# Patient Record
Sex: MALE | Race: WHITE | NOT HISPANIC OR LATINO | Employment: OTHER | URBAN - METROPOLITAN AREA
[De-identification: names, ages, dates, MRNs, and addresses within clinical notes are randomized per-mention and may not be internally consistent; named-entity substitution may affect disease eponyms.]

---

## 2022-06-28 ENCOUNTER — ANESTHESIA EVENT (OUTPATIENT)
Dept: SURGERY | Facility: HOSPITAL | Age: 37
DRG: 481 | End: 2022-06-28
Payer: MEDICARE

## 2022-06-28 ENCOUNTER — ANESTHESIA (OUTPATIENT)
Dept: SURGERY | Facility: HOSPITAL | Age: 37
DRG: 481 | End: 2022-06-28
Payer: MEDICARE

## 2022-06-28 ENCOUNTER — HOSPITAL ENCOUNTER (INPATIENT)
Facility: HOSPITAL | Age: 37
LOS: 19 days | Discharge: HOME HEALTH | DRG: 481 | End: 2022-07-17
Attending: STUDENT IN AN ORGANIZED HEALTH CARE EDUCATION/TRAINING PROGRAM | Admitting: STUDENT IN AN ORGANIZED HEALTH CARE EDUCATION/TRAINING PROGRAM
Payer: MEDICARE

## 2022-06-28 DIAGNOSIS — R07.9 CHEST PAIN: ICD-10-CM

## 2022-06-28 DIAGNOSIS — S72.009A HIP FRACTURE: ICD-10-CM

## 2022-06-28 DIAGNOSIS — S72.142A CLOSED COMMINUTED INTERTROCHANTERIC FRACTURE OF LEFT FEMUR, INITIAL ENCOUNTER: ICD-10-CM

## 2022-06-28 DIAGNOSIS — R00.0 TACHYCARDIA: ICD-10-CM

## 2022-06-28 LAB
ALBUMIN SERPL-MCNC: 3.5 GM/DL (ref 3.5–5)
ALBUMIN/GLOB SERPL: 1 RATIO (ref 1.1–2)
ALP SERPL-CCNC: 146 UNIT/L (ref 40–150)
ALT SERPL-CCNC: 63 UNIT/L (ref 0–55)
AST SERPL-CCNC: 41 UNIT/L (ref 5–34)
BASOPHILS # BLD AUTO: 0.06 X10(3)/MCL (ref 0–0.2)
BASOPHILS NFR BLD AUTO: 0.6 %
BILIRUBIN DIRECT+TOT PNL SERPL-MCNC: 0.8 MG/DL
BUN SERPL-MCNC: 11.1 MG/DL (ref 8.9–20.6)
CALCIUM SERPL-MCNC: 9.3 MG/DL (ref 8.4–10.2)
CHLORIDE SERPL-SCNC: 105 MMOL/L (ref 98–107)
CO2 SERPL-SCNC: 26 MMOL/L (ref 22–29)
CREAT SERPL-MCNC: 0.77 MG/DL (ref 0.73–1.18)
EOSINOPHIL # BLD AUTO: 0.29 X10(3)/MCL (ref 0–0.9)
EOSINOPHIL NFR BLD AUTO: 3 %
ERYTHROCYTE [DISTWIDTH] IN BLOOD BY AUTOMATED COUNT: 14.4 % (ref 11.5–17)
GLOBULIN SER-MCNC: 3.4 GM/DL (ref 2.4–3.5)
GLUCOSE SERPL-MCNC: 94 MG/DL (ref 74–100)
GROUP & RH: NORMAL
HCT VFR BLD AUTO: 33.5 % (ref 42–52)
HGB BLD-MCNC: 10.4 GM/DL (ref 14–18)
IMM GRANULOCYTES # BLD AUTO: 0.07 X10(3)/MCL (ref 0–0.02)
IMM GRANULOCYTES NFR BLD AUTO: 0.7 % (ref 0–0.43)
INDIRECT COOMBS GEL: NORMAL
LYMPHOCYTES # BLD AUTO: 1.43 X10(3)/MCL (ref 0.6–4.6)
LYMPHOCYTES NFR BLD AUTO: 14.8 %
MAGNESIUM SERPL-MCNC: 2.3 MG/DL (ref 1.6–2.6)
MCH RBC QN AUTO: 25.9 PG (ref 27–31)
MCHC RBC AUTO-ENTMCNC: 31 MG/DL (ref 33–36)
MCV RBC AUTO: 83.3 FL (ref 80–94)
MONOCYTES # BLD AUTO: 0.63 X10(3)/MCL (ref 0.1–1.3)
MONOCYTES NFR BLD AUTO: 6.5 %
NEUTROPHILS # BLD AUTO: 7.2 X10(3)/MCL (ref 2.1–9.2)
NEUTROPHILS NFR BLD AUTO: 74.4 %
NRBC BLD AUTO-RTO: 0 %
PHOSPHATE SERPL-MCNC: 3.8 MG/DL (ref 2.3–4.7)
PLATELET # BLD AUTO: 295 X10(3)/MCL (ref 130–400)
PMV BLD AUTO: 9.5 FL (ref 9.4–12.4)
POCT GLUCOSE: 196 MG/DL (ref 70–110)
POCT GLUCOSE: 297 MG/DL (ref 70–110)
POTASSIUM SERPL-SCNC: 5.1 MMOL/L (ref 3.5–5.1)
PROT SERPL-MCNC: 6.9 GM/DL (ref 6.4–8.3)
RBC # BLD AUTO: 4.02 X10(6)/MCL (ref 4.7–6.1)
SODIUM SERPL-SCNC: 138 MMOL/L (ref 136–145)
WBC # SPEC AUTO: 9.7 X10(3)/MCL (ref 4.5–11.5)

## 2022-06-28 PROCEDURE — 99223 1ST HOSP IP/OBS HIGH 75: CPT | Mod: 57,,, | Performed by: ORTHOPAEDIC SURGERY

## 2022-06-28 PROCEDURE — 25000003 PHARM REV CODE 250: Performed by: PHYSICIAN ASSISTANT

## 2022-06-28 PROCEDURE — 11000001 HC ACUTE MED/SURG PRIVATE ROOM

## 2022-06-28 PROCEDURE — 86901 BLOOD TYPING SEROLOGIC RH(D): CPT | Performed by: STUDENT IN AN ORGANIZED HEALTH CARE EDUCATION/TRAINING PROGRAM

## 2022-06-28 PROCEDURE — 25000003 PHARM REV CODE 250: Performed by: NURSE PRACTITIONER

## 2022-06-28 PROCEDURE — 25000003 PHARM REV CODE 250: Performed by: HOSPITALIST

## 2022-06-28 PROCEDURE — 27245 TREAT THIGH FRACTURE: CPT | Mod: LT,,, | Performed by: ORTHOPAEDIC SURGERY

## 2022-06-28 PROCEDURE — 85025 COMPLETE CBC W/AUTO DIFF WBC: CPT | Performed by: NURSE PRACTITIONER

## 2022-06-28 PROCEDURE — 27245 PR OPEN FIX INTER/SUBTROCH FX,IMPLNT: ICD-10-PCS | Mod: AS,LT,, | Performed by: PHYSICIAN ASSISTANT

## 2022-06-28 PROCEDURE — 63600175 PHARM REV CODE 636 W HCPCS: Performed by: PHYSICIAN ASSISTANT

## 2022-06-28 PROCEDURE — 97163 PT EVAL HIGH COMPLEX 45 MIN: CPT

## 2022-06-28 PROCEDURE — 71000033 HC RECOVERY, INTIAL HOUR: Performed by: ORTHOPAEDIC SURGERY

## 2022-06-28 PROCEDURE — 63600175 PHARM REV CODE 636 W HCPCS: Performed by: ORTHOPAEDIC SURGERY

## 2022-06-28 PROCEDURE — 36000711: Performed by: ORTHOPAEDIC SURGERY

## 2022-06-28 PROCEDURE — 27245 TREAT THIGH FRACTURE: CPT | Mod: AS,LT,, | Performed by: PHYSICIAN ASSISTANT

## 2022-06-28 PROCEDURE — 37000009 HC ANESTHESIA EA ADD 15 MINS: Performed by: ORTHOPAEDIC SURGERY

## 2022-06-28 PROCEDURE — 25000003 PHARM REV CODE 250: Performed by: NURSE ANESTHETIST, CERTIFIED REGISTERED

## 2022-06-28 PROCEDURE — C1769 GUIDE WIRE: HCPCS | Performed by: ORTHOPAEDIC SURGERY

## 2022-06-28 PROCEDURE — 27245 PR OPEN FIX INTER/SUBTROCH FX,IMPLNT: ICD-10-PCS | Mod: LT,,, | Performed by: ORTHOPAEDIC SURGERY

## 2022-06-28 PROCEDURE — 86920 COMPATIBILITY TEST SPIN: CPT | Performed by: HOSPITALIST

## 2022-06-28 PROCEDURE — 63600175 PHARM REV CODE 636 W HCPCS: Performed by: NURSE ANESTHETIST, CERTIFIED REGISTERED

## 2022-06-28 PROCEDURE — 36415 COLL VENOUS BLD VENIPUNCTURE: CPT | Performed by: NURSE PRACTITIONER

## 2022-06-28 PROCEDURE — P9047 ALBUMIN (HUMAN), 25%, 50ML: HCPCS | Mod: JG | Performed by: NURSE ANESTHETIST, CERTIFIED REGISTERED

## 2022-06-28 PROCEDURE — 63600175 PHARM REV CODE 636 W HCPCS: Performed by: NURSE PRACTITIONER

## 2022-06-28 PROCEDURE — 97166 OT EVAL MOD COMPLEX 45 MIN: CPT

## 2022-06-28 PROCEDURE — 36000710: Performed by: ORTHOPAEDIC SURGERY

## 2022-06-28 PROCEDURE — 99223 PR INITIAL HOSPITAL CARE,LEVL III: ICD-10-PCS | Mod: 57,,, | Performed by: ORTHOPAEDIC SURGERY

## 2022-06-28 PROCEDURE — 83735 ASSAY OF MAGNESIUM: CPT | Performed by: NURSE PRACTITIONER

## 2022-06-28 PROCEDURE — 80053 COMPREHEN METABOLIC PANEL: CPT | Performed by: NURSE PRACTITIONER

## 2022-06-28 PROCEDURE — 36415 COLL VENOUS BLD VENIPUNCTURE: CPT | Performed by: ORTHOPAEDIC SURGERY

## 2022-06-28 PROCEDURE — 84100 ASSAY OF PHOSPHORUS: CPT | Performed by: NURSE PRACTITIONER

## 2022-06-28 PROCEDURE — C1713 ANCHOR/SCREW BN/BN,TIS/BN: HCPCS | Performed by: ORTHOPAEDIC SURGERY

## 2022-06-28 PROCEDURE — 27201423 OPTIME MED/SURG SUP & DEVICES STERILE SUPPLY: Performed by: ORTHOPAEDIC SURGERY

## 2022-06-28 PROCEDURE — 63600175 PHARM REV CODE 636 W HCPCS: Performed by: ANESTHESIOLOGY

## 2022-06-28 PROCEDURE — 37000008 HC ANESTHESIA 1ST 15 MINUTES: Performed by: ORTHOPAEDIC SURGERY

## 2022-06-28 DEVICE — SCREW XL25 IM NAIL 38X5MM: Type: IMPLANTABLE DEVICE | Site: FEMUR | Status: FUNCTIONAL

## 2022-06-28 DEVICE — BLADE HELICAL PERF GOLD 95MM: Type: IMPLANTABLE DEVICE | Site: FEMUR | Status: FUNCTIONAL

## 2022-06-28 DEVICE — SCREW XL25 IM NAIL 42X5MM: Type: IMPLANTABLE DEVICE | Site: FEMUR | Status: FUNCTIONAL

## 2022-06-28 DEVICE — NAIL TFNA 130DEG 11MM L STRL: Type: IMPLANTABLE DEVICE | Site: FEMUR | Status: FUNCTIONAL

## 2022-06-28 RX ORDER — ACETAMINOPHEN 325 MG/1
650 TABLET ORAL EVERY 4 HOURS PRN
Status: DISCONTINUED | OUTPATIENT
Start: 2022-06-28 | End: 2022-07-17 | Stop reason: HOSPADM

## 2022-06-28 RX ORDER — SODIUM CHLORIDE 0.9 % (FLUSH) 0.9 %
10 SYRINGE (ML) INJECTION
Status: DISCONTINUED | OUTPATIENT
Start: 2022-06-28 | End: 2022-07-17 | Stop reason: HOSPADM

## 2022-06-28 RX ORDER — POLYETHYLENE GLYCOL 3350 17 G/17G
17 POWDER, FOR SOLUTION ORAL 2 TIMES DAILY PRN
Status: DISCONTINUED | OUTPATIENT
Start: 2022-06-28 | End: 2022-07-17 | Stop reason: HOSPADM

## 2022-06-28 RX ORDER — HYDROXYZINE HYDROCHLORIDE 25 MG/1
25 TABLET, FILM COATED ORAL DAILY
COMMUNITY

## 2022-06-28 RX ORDER — DOCUSATE SODIUM 100 MG/1
100 CAPSULE, LIQUID FILLED ORAL DAILY
Status: DISCONTINUED | OUTPATIENT
Start: 2022-06-28 | End: 2022-07-17 | Stop reason: HOSPADM

## 2022-06-28 RX ORDER — METOPROLOL TARTRATE 50 MG/1
50 TABLET ORAL ONCE
Status: COMPLETED | OUTPATIENT
Start: 2022-06-28 | End: 2022-06-28

## 2022-06-28 RX ORDER — BISACODYL 10 MG
10 SUPPOSITORY, RECTAL RECTAL DAILY PRN
Status: DISCONTINUED | OUTPATIENT
Start: 2022-06-28 | End: 2022-07-17 | Stop reason: HOSPADM

## 2022-06-28 RX ORDER — HETASTARCH 6 G/100ML
INJECTION, SOLUTION INTRAVENOUS CONTINUOUS PRN
Status: DISCONTINUED | OUTPATIENT
Start: 2022-06-28 | End: 2022-06-28

## 2022-06-28 RX ORDER — ONDANSETRON 2 MG/ML
4 INJECTION INTRAMUSCULAR; INTRAVENOUS EVERY 6 HOURS PRN
Status: DISCONTINUED | OUTPATIENT
Start: 2022-06-28 | End: 2022-07-17 | Stop reason: HOSPADM

## 2022-06-28 RX ORDER — ONDANSETRON 2 MG/ML
4 INJECTION INTRAMUSCULAR; INTRAVENOUS DAILY PRN
Status: DISCONTINUED | OUTPATIENT
Start: 2022-06-28 | End: 2022-07-17 | Stop reason: HOSPADM

## 2022-06-28 RX ORDER — DEXAMETHASONE SODIUM PHOSPHATE 4 MG/ML
INJECTION, SOLUTION INTRA-ARTICULAR; INTRALESIONAL; INTRAMUSCULAR; INTRAVENOUS; SOFT TISSUE
Status: DISCONTINUED | OUTPATIENT
Start: 2022-06-28 | End: 2022-06-28

## 2022-06-28 RX ORDER — HYDROXYZINE HYDROCHLORIDE 25 MG/1
25 TABLET, FILM COATED ORAL DAILY
Status: DISCONTINUED | OUTPATIENT
Start: 2022-06-28 | End: 2022-07-17 | Stop reason: HOSPADM

## 2022-06-28 RX ORDER — CEFAZOLIN SODIUM 2 G/50ML
2 SOLUTION INTRAVENOUS
Status: DISCONTINUED | OUTPATIENT
Start: 2022-06-28 | End: 2022-07-17 | Stop reason: HOSPADM

## 2022-06-28 RX ORDER — PROCHLORPERAZINE EDISYLATE 5 MG/ML
5 INJECTION INTRAMUSCULAR; INTRAVENOUS EVERY 6 HOURS PRN
Status: DISCONTINUED | OUTPATIENT
Start: 2022-06-28 | End: 2022-07-17 | Stop reason: HOSPADM

## 2022-06-28 RX ORDER — LEVOTHYROXINE SODIUM 50 UG/1
50 TABLET ORAL DAILY
Status: DISCONTINUED | OUTPATIENT
Start: 2022-06-28 | End: 2022-07-17 | Stop reason: HOSPADM

## 2022-06-28 RX ORDER — HYDROMORPHONE HYDROCHLORIDE 2 MG/ML
0.5 INJECTION, SOLUTION INTRAMUSCULAR; INTRAVENOUS; SUBCUTANEOUS EVERY 5 MIN PRN
Status: DISCONTINUED | OUTPATIENT
Start: 2022-06-28 | End: 2022-07-17 | Stop reason: HOSPADM

## 2022-06-28 RX ORDER — METHOCARBAMOL 500 MG/1
500 TABLET, FILM COATED ORAL 3 TIMES DAILY
Status: DISCONTINUED | OUTPATIENT
Start: 2022-06-28 | End: 2022-07-17 | Stop reason: HOSPADM

## 2022-06-28 RX ORDER — OXYCODONE AND ACETAMINOPHEN 10; 325 MG/1; MG/1
1 TABLET ORAL EVERY 4 HOURS PRN
Status: DISCONTINUED | OUTPATIENT
Start: 2022-06-28 | End: 2022-07-17 | Stop reason: HOSPADM

## 2022-06-28 RX ORDER — SUCCINYLCHOLINE CHLORIDE 20 MG/ML
INJECTION INTRAMUSCULAR; INTRAVENOUS
Status: DISCONTINUED | OUTPATIENT
Start: 2022-06-28 | End: 2022-06-28

## 2022-06-28 RX ORDER — MORPHINE SULFATE 4 MG/ML
2 INJECTION, SOLUTION INTRAMUSCULAR; INTRAVENOUS EVERY 4 HOURS PRN
Status: DISCONTINUED | OUTPATIENT
Start: 2022-06-28 | End: 2022-07-17 | Stop reason: HOSPADM

## 2022-06-28 RX ORDER — ACETAMINOPHEN 10 MG/ML
1000 INJECTION, SOLUTION INTRAVENOUS ONCE
Status: ACTIVE | OUTPATIENT
Start: 2022-06-28 | End: 2022-06-29

## 2022-06-28 RX ORDER — ONDANSETRON 4 MG/1
4 TABLET, ORALLY DISINTEGRATING ORAL EVERY 8 HOURS PRN
Status: DISCONTINUED | OUTPATIENT
Start: 2022-06-28 | End: 2022-07-17 | Stop reason: HOSPADM

## 2022-06-28 RX ORDER — METOPROLOL TARTRATE 50 MG/1
50 TABLET ORAL ONCE
Status: ON HOLD | COMMUNITY
End: 2022-07-06 | Stop reason: HOSPADM

## 2022-06-28 RX ORDER — BUSPIRONE HYDROCHLORIDE 5 MG/1
5 TABLET ORAL ONCE
Status: ON HOLD | COMMUNITY
End: 2022-07-06 | Stop reason: HOSPADM

## 2022-06-28 RX ORDER — FENTANYL CITRATE 50 UG/ML
INJECTION, SOLUTION INTRAMUSCULAR; INTRAVENOUS
Status: DISCONTINUED | OUTPATIENT
Start: 2022-06-28 | End: 2022-06-28

## 2022-06-28 RX ORDER — CEFAZOLIN SODIUM 2 G/50ML
SOLUTION INTRAVENOUS
Status: DISCONTINUED
Start: 2022-06-28 | End: 2022-06-28

## 2022-06-28 RX ORDER — BUSPIRONE HYDROCHLORIDE 5 MG/1
5 TABLET ORAL ONCE
Status: COMPLETED | OUTPATIENT
Start: 2022-06-28 | End: 2022-06-28

## 2022-06-28 RX ORDER — AMOXICILLIN 250 MG
1 CAPSULE ORAL 2 TIMES DAILY PRN
Status: DISCONTINUED | OUTPATIENT
Start: 2022-06-28 | End: 2022-07-17 | Stop reason: HOSPADM

## 2022-06-28 RX ORDER — ONDANSETRON 2 MG/ML
4 INJECTION INTRAMUSCULAR; INTRAVENOUS EVERY 4 HOURS PRN
Status: DISCONTINUED | OUTPATIENT
Start: 2022-06-28 | End: 2022-07-17 | Stop reason: HOSPADM

## 2022-06-28 RX ORDER — ADHESIVE BANDAGE
30 BANDAGE TOPICAL DAILY PRN
Status: DISCONTINUED | OUTPATIENT
Start: 2022-06-28 | End: 2022-07-17 | Stop reason: HOSPADM

## 2022-06-28 RX ORDER — ENOXAPARIN SODIUM 100 MG/ML
40 INJECTION SUBCUTANEOUS EVERY 24 HOURS
Status: DISCONTINUED | OUTPATIENT
Start: 2022-06-28 | End: 2022-07-17 | Stop reason: HOSPADM

## 2022-06-28 RX ORDER — ALBUMIN HUMAN 250 G/1000ML
SOLUTION INTRAVENOUS CONTINUOUS PRN
Status: DISCONTINUED | OUTPATIENT
Start: 2022-06-28 | End: 2022-06-28

## 2022-06-28 RX ORDER — CEFAZOLIN SODIUM 1 G/3ML
INJECTION, POWDER, FOR SOLUTION INTRAMUSCULAR; INTRAVENOUS
Status: DISCONTINUED | OUTPATIENT
Start: 2022-06-28 | End: 2022-06-28

## 2022-06-28 RX ORDER — ROCURONIUM BROMIDE 10 MG/ML
INJECTION, SOLUTION INTRAVENOUS
Status: DISCONTINUED | OUTPATIENT
Start: 2022-06-28 | End: 2022-06-28

## 2022-06-28 RX ORDER — QUETIAPINE FUMARATE 200 MG/1
200 TABLET, FILM COATED ORAL DAILY
COMMUNITY

## 2022-06-28 RX ORDER — SODIUM CHLORIDE, SODIUM GLUCONATE, SODIUM ACETATE, POTASSIUM CHLORIDE AND MAGNESIUM CHLORIDE 30; 37; 368; 526; 502 MG/100ML; MG/100ML; MG/100ML; MG/100ML; MG/100ML
1000 INJECTION, SOLUTION INTRAVENOUS CONTINUOUS
Status: CANCELLED | OUTPATIENT
Start: 2022-06-28 | End: 2022-07-28

## 2022-06-28 RX ORDER — LEVOTHYROXINE SODIUM 50 UG/1
50 TABLET ORAL DAILY
COMMUNITY

## 2022-06-28 RX ORDER — LIDOCAINE HYDROCHLORIDE 10 MG/ML
1 INJECTION, SOLUTION EPIDURAL; INFILTRATION; INTRACAUDAL; PERINEURAL ONCE
Status: CANCELLED | OUTPATIENT
Start: 2022-06-28 | End: 2022-06-28

## 2022-06-28 RX ORDER — ACETAMINOPHEN 10 MG/ML
INJECTION, SOLUTION INTRAVENOUS
Status: DISCONTINUED | OUTPATIENT
Start: 2022-06-28 | End: 2022-06-28

## 2022-06-28 RX ORDER — OFLOXACIN 3 MG/ML
SOLUTION AURICULAR (OTIC) DAILY
COMMUNITY

## 2022-06-28 RX ORDER — DIPHENHYDRAMINE HYDROCHLORIDE 50 MG/ML
25 INJECTION INTRAMUSCULAR; INTRAVENOUS EVERY 6 HOURS PRN
Status: DISCONTINUED | OUTPATIENT
Start: 2022-06-28 | End: 2022-06-30

## 2022-06-28 RX ORDER — PROPOFOL 10 MG/ML
VIAL (ML) INTRAVENOUS
Status: DISCONTINUED | OUTPATIENT
Start: 2022-06-28 | End: 2022-06-28

## 2022-06-28 RX ORDER — QUETIAPINE FUMARATE 100 MG/1
200 TABLET, FILM COATED ORAL DAILY
Status: DISCONTINUED | OUTPATIENT
Start: 2022-06-28 | End: 2022-06-30

## 2022-06-28 RX ORDER — CEFAZOLIN SODIUM 2 G/50ML
SOLUTION INTRAVENOUS
Status: DISCONTINUED
Start: 2022-06-28 | End: 2022-06-28 | Stop reason: WASHOUT

## 2022-06-28 RX ORDER — HYDROCODONE BITARTRATE AND ACETAMINOPHEN 10; 325 MG/1; MG/1
1 TABLET ORAL EVERY 6 HOURS PRN
Status: DISCONTINUED | OUTPATIENT
Start: 2022-06-28 | End: 2022-06-28

## 2022-06-28 RX ORDER — OXYCODONE AND ACETAMINOPHEN 5; 325 MG/1; MG/1
1 TABLET ORAL EVERY 4 HOURS PRN
Status: DISCONTINUED | OUTPATIENT
Start: 2022-06-28 | End: 2022-07-17 | Stop reason: HOSPADM

## 2022-06-28 RX ORDER — MAG HYDROX/ALUMINUM HYD/SIMETH 200-200-20
30 SUSPENSION, ORAL (FINAL DOSE FORM) ORAL EVERY 4 HOURS PRN
Status: DISCONTINUED | OUTPATIENT
Start: 2022-06-28 | End: 2022-07-17 | Stop reason: HOSPADM

## 2022-06-28 RX ORDER — SENNOSIDES 8.6 MG/1
8.6 TABLET ORAL DAILY PRN
Status: DISCONTINUED | OUTPATIENT
Start: 2022-06-28 | End: 2022-07-17 | Stop reason: HOSPADM

## 2022-06-28 RX ORDER — CEFAZOLIN SODIUM 2 G/50ML
2 SOLUTION INTRAVENOUS
Status: DISCONTINUED | OUTPATIENT
Start: 2022-06-28 | End: 2022-06-29

## 2022-06-28 RX ORDER — FAMOTIDINE 20 MG/1
20 TABLET, FILM COATED ORAL DAILY PRN
Status: DISCONTINUED | OUTPATIENT
Start: 2022-06-28 | End: 2022-07-17 | Stop reason: HOSPADM

## 2022-06-28 RX ORDER — PHENYLEPHRINE HYDROCHLORIDE 10 MG/ML
INJECTION INTRAVENOUS
Status: DISCONTINUED | OUTPATIENT
Start: 2022-06-28 | End: 2022-06-28

## 2022-06-28 RX ORDER — VANCOMYCIN HYDROCHLORIDE 1 G/20ML
INJECTION, POWDER, LYOPHILIZED, FOR SOLUTION INTRAVENOUS
Status: DISCONTINUED | OUTPATIENT
Start: 2022-06-28 | End: 2022-06-28 | Stop reason: HOSPADM

## 2022-06-28 RX ADMIN — OXYCODONE AND ACETAMINOPHEN 1 TABLET: 10; 325 TABLET ORAL at 11:06

## 2022-06-28 RX ADMIN — CEFAZOLIN 2 G: 330 INJECTION, POWDER, FOR SOLUTION INTRAMUSCULAR; INTRAVENOUS at 07:06

## 2022-06-28 RX ADMIN — MORPHINE SULFATE 2 MG: 4 INJECTION INTRAVENOUS at 01:06

## 2022-06-28 RX ADMIN — SODIUM CHLORIDE 500 ML: 9 INJECTION, SOLUTION INTRAVENOUS at 11:06

## 2022-06-28 RX ADMIN — PHENYLEPHRINE HYDROCHLORIDE 100 MCG: 10 INJECTION INTRAVENOUS at 07:06

## 2022-06-28 RX ADMIN — QUETIAPINE FUMARATE 200 MG: 100 TABLET ORAL at 11:06

## 2022-06-28 RX ADMIN — PHENYLEPHRINE HYDROCHLORIDE 200 MCG: 10 INJECTION INTRAVENOUS at 08:06

## 2022-06-28 RX ADMIN — PHENYLEPHRINE HYDROCHLORIDE 100 MCG: 10 INJECTION INTRAVENOUS at 08:06

## 2022-06-28 RX ADMIN — SUCCINYLCHOLINE CHLORIDE 120 MG: 20 INJECTION, SOLUTION INTRAMUSCULAR; INTRAVENOUS at 07:06

## 2022-06-28 RX ADMIN — CEFAZOLIN SODIUM 2 G: 2 SOLUTION INTRAVENOUS at 11:06

## 2022-06-28 RX ADMIN — ONDANSETRON 4 MG: 2 INJECTION INTRAMUSCULAR; INTRAVENOUS at 07:06

## 2022-06-28 RX ADMIN — SODIUM CHLORIDE, SODIUM GLUCONATE, SODIUM ACETATE, POTASSIUM CHLORIDE AND MAGNESIUM CHLORIDE: 526; 502; 368; 37; 30 INJECTION, SOLUTION INTRAVENOUS at 07:06

## 2022-06-28 RX ADMIN — METHOCARBAMOL 500 MG: 500 TABLET ORAL at 11:06

## 2022-06-28 RX ADMIN — LEVOTHYROXINE SODIUM 50 MCG: 25 TABLET ORAL at 11:06

## 2022-06-28 RX ADMIN — DOCUSATE SODIUM 100 MG: 100 CAPSULE, LIQUID FILLED ORAL at 11:06

## 2022-06-28 RX ADMIN — BUSPIRONE HYDROCHLORIDE 5 MG: 5 TABLET ORAL at 11:06

## 2022-06-28 RX ADMIN — HYDROMORPHONE HYDROCHLORIDE 0.5 MG: 2 INJECTION, SOLUTION INTRAMUSCULAR; INTRAVENOUS; SUBCUTANEOUS at 09:06

## 2022-06-28 RX ADMIN — FENTANYL CITRATE 50 MCG: 50 INJECTION, SOLUTION INTRAMUSCULAR; INTRAVENOUS at 08:06

## 2022-06-28 RX ADMIN — SUGAMMADEX 200 MG: 100 INJECTION, SOLUTION INTRAVENOUS at 08:06

## 2022-06-28 RX ADMIN — ACETAMINOPHEN 650 MG: 325 TABLET, FILM COATED ORAL at 06:06

## 2022-06-28 RX ADMIN — ROCURONIUM BROMIDE 5 MG: 10 SOLUTION INTRAVENOUS at 07:06

## 2022-06-28 RX ADMIN — ALBUMIN (HUMAN): 12.5 SOLUTION INTRAVENOUS at 08:06

## 2022-06-28 RX ADMIN — METOPROLOL TARTRATE 50 MG: 50 TABLET, FILM COATED ORAL at 11:06

## 2022-06-28 RX ADMIN — ENOXAPARIN SODIUM 40 MG: 100 INJECTION SUBCUTANEOUS at 05:06

## 2022-06-28 RX ADMIN — HETASTARCH IN SODIUM CHLORIDE: 6; .9 INJECTION, SOLUTION INTRAVENOUS at 08:06

## 2022-06-28 RX ADMIN — ACETAMINOPHEN 1000 MG: 10 INJECTION, SOLUTION INTRAVENOUS at 07:06

## 2022-06-28 RX ADMIN — PHENYLEPHRINE HYDROCHLORIDE 200 MCG: 10 INJECTION INTRAVENOUS at 07:06

## 2022-06-28 RX ADMIN — HYDROXYZINE HYDROCHLORIDE 25 MG: 25 TABLET ORAL at 11:06

## 2022-06-28 RX ADMIN — ROCURONIUM BROMIDE 30 MG: 10 SOLUTION INTRAVENOUS at 07:06

## 2022-06-28 RX ADMIN — FENTANYL CITRATE 50 MCG: 50 INJECTION, SOLUTION INTRAMUSCULAR; INTRAVENOUS at 07:06

## 2022-06-28 RX ADMIN — DEXAMETHASONE SODIUM PHOSPHATE 4 MG: 4 INJECTION, SOLUTION INTRA-ARTICULAR; INTRALESIONAL; INTRAMUSCULAR; INTRAVENOUS; SOFT TISSUE at 07:06

## 2022-06-28 RX ADMIN — PROPOFOL 100 MG: 10 INJECTION, EMULSION INTRAVENOUS at 07:06

## 2022-06-28 NOTE — PT/OT/SLP EVAL
"Occupational Therapy   Evaluation    Name: Nilo Claros Jr.  MRN: [id number]  Admitting Diagnosis:  Closed comminuted intertrochanteric fracture of left femur, hx of falls, s/p IMN of L intertrochanteric fracture of femur, MRDD  Recent Surgery: Procedure(s) (LRB):  INSERTION, INTRAMEDULLARY AMIRAH, FEMUR (Left) Day of Surgery    Recommendations:     Discharge Recommendations: nursing facility, skilled vs  rehabilitation facility  Discharge Equipment Recommendations:  bedside commode, walker, rolling, bath bench  Barriers to discharge:  Decreased caregiver support (Pts mom stated that it has been harder to care for him at home)    Assessment:     Nilo Claros Jr. is a 36 y.o. male with a medical diagnosis of Closed comminuted intertrochanteric fracture of left femur, hx of falls, s/p IMN of L intertrochanteric fracture of femur, MRDD. Performance deficits affecting function: impaired self care skills, impaired functional mobilty, impaired cognition, decreased lower extremity function, decreased safety awareness, pain, orthopedic precautions.      Rehab Prognosis: Good; patient would benefit from acute skilled OT services to address these deficits and reach maximum level of function.       Plan:     Patient to be seen 5 x/week, daily to address the above listed problems via self-care/home management  Plan of Care Expires: 07/12/22  Plan of Care Reviewed with: patient, mother    Subjective     Chief Complaint: "It hurts"   Patient/Family Comments/goals: To get him stronger and more independent      Occupational Profile:  Living Environment: Pt lives at home with his mom, ad, and brother. Family lives in a mobile home that has 4-5 steps required to go in through the entrance and back door with handrails on both sides. Mom reports that pt has fallen on these steps before. There is a bath and a walkin shower, however mom states that pt likes to take baths. There are grab bars in the walk in shower and mom " reports that when he performs toileting he has to hold on to something.   Previous level of function: As per mom, she stated that he was independent prior in ADLs. She shared that pt started losing his balance and having multiple falls, she is concerned because he has a shunt in place and believes that there might be something wrong because pt was doing well. She reports they checked shunt yesterday and it was ok.  Mom shared that he had a recent kidney infection which caused him to have bladder incontinence but that has resolved and pt can verbalize and know when he needs to use the bathroom.    Equipment Used at Home:  None, grab bars around walk in shower.   Assistance upon Discharge: Pt will have limited assistance upon d/c, as mom verbalized that she cannot physically care for him. She shared that during his most recent fall she was not able to pick him up d/t patient being too heavy. Mom would like for pt to get stronger and more independent before going home. She is afraid that if he falls again he can disrupt shunt placement.     Pain/Comfort:  Pain Rating 1: 7/10  Location - Side 1: Left  Location 1: leg  Pain Addressed 1: Reposition, Distraction    Patients cultural, spiritual, Bahai conflicts given the current situation: no    Objective:      Patient found supine with peripheral IV upon OT entry to room.    General Precautions: Standard, fall   Orthopedic Precautions:LLE weight bearing as tolerated   Respiratory Status: Room air    Occupational Performance:    Bed Mobility:    Patient completed Supine to Sit with minimum assistance  Patient completed Sit to Supine with minimum assistance   Minimum assistance required during bed mobility d/t pain in LLE.     Functional Mobility/Transfers:  Patient completed Sit <> Stand Transfer with minimum assistance with rolling walker   Functional Mobility: Pt able to perform sit to stand with min assistance 2x. Pt required min cues for hand placement and  "postioning on RW. Pt limited in standing for long period of time or ambulating d/t pain. Pt reported "it hurts" when standing. Pt able to reach upright posture 1x, Pt observed being able to tolerate light WB on LLE.     Activities of Daily Living:  Pt limited by pain. OT to continue assessing and update.     Cognitive/Visual Perceptual:  Cognitive/Psychosocial Skills:     -       Follows Commands/attention:Follows two-step commands  -       Mood/Affect/Coping skills/emotional control: Cooperative and Pleasant  Visual/Perceptual:      -Intact  tracking    Physical Exam:  Upper Extremity Strength:    -       Right Upper Extremity: 3-/5 shoulder, 3+/5 elbow, 3/5 wrist, 3/5 hand  -       Left Upper Extremity: 3-/5 shoulder, 3+/5 elbow, 3/5 wrist, 3/5 hand    Treatment & Education:  OT educated mom and pt on POC. Mom shared concern on decreased assistance that she can provide and inquired about pt going to placement so that he can get stronger and increase his independent. Mom shared that his onset of decreased balance started out of nowhere and he has been having a lot of falls.  Pt followed commands and understood directions regarding mobility and hand placement. Pt able to communicate enough to interact with OT.     Patient left supine with all lines intact, call button in reach and mom present    GOALS:   Multidisciplinary Problems       Occupational Therapy Goals          Problem: Occupational Therapy    Goal Priority Disciplines Outcome Interventions   Occupational Therapy Goal     OT, PT/OT Ongoing, Progressing    Description: Goals to be met by: 7/12/22    Patient will increase functional independence with ADLs by performing:    Feeding with [place].  UE Dressing with [place].  LE Dressing with [place].  Grooming while standing at sink with [place].  Toileting from toilet with [place] for hygiene and clothing management.   Toilet transfer to toilet with [place].                   "       History:     No past medical history on file.    No past surgical history on file.    Time Tracking:     OT Date of Treatment: 06/28/22  OT Start Time: 1329  OT Stop Time: 1347  OT Total Time (min): 18 min    Billable Minutes:Evaluation mod complexity     6/28/2022  Note signed and edited by Gisele Mcdermott, direct supervision

## 2022-06-28 NOTE — TRANSFER OF CARE
Anesthesia Transfer of Care Note    Patient: Nilo Claros Jr.    Procedure(s) Performed: Procedure(s) (LRB):  INSERTION, INTRAMEDULLARY AMIRAH, FEMUR (Left)    Patient location: PACU    Anesthesia Type: general    Transport from OR: Transported from OR on room air with adequate spontaneous ventilation    Post pain: adequate analgesia    Post assessment: no apparent anesthetic complications    Post vital signs: stable    Level of consciousness: awake    Nausea/Vomiting: no nausea/vomiting    Complications: none    Transfer of care protocol was followed      Last vitals:   Visit Vitals  BP 96/67   Pulse 108   Temp 36.8 °C (98.3 °F)   Resp 20   SpO2 (!) 93%

## 2022-06-28 NOTE — H&P
[facility] Hospital Medicine History & Physical Examination       Patient Name: Nilo Claros Jr.  MRN: [id number]  Patient Class: IP- Inpatient   Admission Date: 6/28/2022   Admitting Physician:  Service   Attending Physician: Clemente Patel MD  Primary Care Provider: Primary Doctor No  Face-to-Face encounter date: 06/28/2022  Code Status:<CODESTATUS>   Chief Complaint: No chief complaint on file.          Patient information was obtained from patient, patient's family, past medical records and ER records.     HISTORY OF PRESENT ILLNESS:   Nilo Claros Jr. is a 36 y.o. male who  has no past medical history on file.. The patient presented to [facility] on 6/28/2022     36 year old male with pmhx of childhood brain tumor s/p excision with chornic developmental delay, anxiety, and hypothyroidism presents to the ED after a fall at home.  Mother at bedside providing history as patient unable to participate in conversation.  Mother reports multiple falls lately and she's having greater difficulty caring for him.  States he tripped and fell last night and she was unable to get him off the floor.  EMS was called and he was transported ot the hospital.  XRs revealed L intertrochanteric femur fracture.  Vitals and labs stable and at baseline.  He does not take any blood thinners.  Compliant with home med regimen.  Orthopedics was consulted and the patient was admitted to the hospitalist group    PAST MEDICAL HISTORY:     Developmental delay  Generalized anxiety disorder  Essential Hypertension    PAST SURGICAL HISTORY:     Childhood brain tumor excision    ALLERGIES:   Toradol [ketorolac]    FAMILY HISTORY:   Reviewed and negative    SOCIAL HISTORY:     Social History     Tobacco Use    Smoking status: Not on file    Smokeless tobacco: Not on file   Substance Use Topics    Alcohol use: Not on file        HOME MEDICATIONS:     Prior to Admission medications    Not on File     Nursing  to update    REVIEW OF SYSTEMS:   Except as documented, all other systems reviewed and negative     PHYSICAL EXAM:     VITAL SIGNS: 24 HRS MIN & MAX LAST   Temp  Min: 98.3 °F (36.8 °C)  Max: 98.3 °F (36.8 °C) 98.3 °F (36.8 °C)   BP  Min: 96/67  Max: 111/76 103/74   Pulse  Min: 98  Max: 115  110   Resp  Min: 15  Max: 20 17   SpO2  Min: 71 %  Max: 100 % (!) 71 %       General appearance: Well-developed, well-nourished male in no apparent distress.  HENT: Atraumatic head. Moist mucous membranes of oral cavity.  Eyes: Normal extraocular movements.   Neck: Supple.   Lungs: Clear to auscultation bilaterally. No wheezing present.   Heart: Regular rate and rhythm. S1 and S2 present with no murmurs/gallop/rub. No pedal edema. No JVD present.   Abdomen: Soft, non-distended, non-tender. No rebound tenderness/guarding. Bowel sounds are normal.   Extremities: No cyanosis, clubbing, or edema.  Skin: No Rash.   Neuro: Motor and sensory exams grossly intact. Good tone. Muscle strength 5/5 in all 4 extremities  Psych/mental status: Appropriate mood and affect. Responds appropriately to questions.     LABS AND IMAGING:     Recent Labs   Lab 06/28/22  0415   WBC 9.7   RBC 4.02*   HGB 10.4*   HCT 33.5*   MCV 83.3   MCH 25.9*   MCHC 31.0*   RDW 14.4      MPV 9.5       Recent Labs   Lab 06/28/22  0415      K 5.1   CO2 26   BUN 11.1   CREATININE 0.77   CALCIUM 9.3   MG 2.30   ALBUMIN 3.5   ALKPHOS 146   ALT 63*   AST 41*   BILITOT 0.8       Microbiology Results (last 7 days)     ** No results found for the last 168 hours. **           X-Ray Femur 2 View Left  Narrative: EXAMINATION:  XR FEMUR 2 VIEW LEFT    CLINICAL HISTORY:  post-op;    TECHNIQUE:  AP and lateral views of the left femur were performed.    COMPARISON:  06/28/2022    FINDINGS:  There is a compression screw in the left hip.  The lesser trochanter is a free fragment.  Intramedullary jennifer is in place.  The alignment is within normal limits.  Impression: Good  alignment of fracture status post placement of compression screw.    Electronically signed by: Osmin Hooker MD  Date:    06/28/2022  Time:    10:09  SURG FL Surgery Fluoro Usage  See OP Notes for results.     IMPRESSION: See OP Notes for results.     This procedure was auto-finalized by: Virtual Radiologist  X-Ray Femur 2 View Left  Narrative: EXAMINATION:  XR FEMUR 2 VIEW LEFT    CLINICAL HISTORY:  ; fx;    TECHNIQUE:  Frontal and lateral views of the femur(s).    COMPARISON:  None available at the time of initial interpretation.    FINDINGS:  Views of the left femur are substandard, with absence of typical orthogonal projections.  Acute, mildly displaced intertrochanteric fracture is appreciated at the left proximal femur.  No other acute osseous abnormality is identified.  Visualized joints are congruent.  Included pelvic ring structures are intact.    The included soft tissues are without acute abnormality.  Impression: Limited evaluation secondary to substandard technique.  Within limitations:    1. Mildly displaced intertrochanteric left femur fracture.  2. No other convincing acute abnormality.    Electronically signed by: Thierno Kong  Date:    06/28/2022  Time:    07:32      _____________________________________  INPATIENT LIST OF MEDICATIONS     Current Facility-Administered Medications:     acetaminophen 1,000 mg/100 mL (10 mg/mL) injection 1,000 mg, 1,000 mg, Intravenous, Once, Darrel Hernandez MD    acetaminophen tablet 650 mg, 650 mg, Oral, Q4H PRN, PARRISH Rios    acetaminophen tablet 650 mg, 650 mg, Oral, Q4H PRN, PARRISH Rios    aluminum-magnesium hydroxide-simethicone 200-200-20 mg/5 mL suspension 30 mL, 30 mL, Oral, Q4H PRN, PARRISH Rios    bisacodyL suppository 10 mg, 10 mg, Rectal, Daily PRN, PARRISH Rios    bisacodyL suppository 10 mg, 10 mg, Rectal, Daily PRN, Lois Clements PA-C    cefazolin (ANCEF) 2 gram in dextrose 5% 50 mL IVPB (premix), 2 g,  Intravenous, On Call Procedure, Chalino Melgar,     cefazolin (ANCEF) 2 gram in dextrose 5% 50 mL IVPB (premix), 2 g, Intravenous, Q8H, Lois Clements PA-C    ceFAZolin 2 g/50mL Dextrose IVPB (ANCEF) 2 gram/50 mL IVPB PgBk, , , ,     diphenhydrAMINE injection 25 mg, 25 mg, Intravenous, Q6H PRN, Darrel Hernandez MD    docusate sodium capsule 100 mg, 100 mg, Oral, Daily, Lois Clements PA-C    enoxaparin injection 40 mg, 40 mg, Subcutaneous, Daily, Lois Clements PA-C    famotidine tablet 20 mg, 20 mg, Oral, Daily PRN, Lois CHRISTIANSON Tyree, PA-C    HYDROmorphone (PF) injection 0.5 mg, 0.5 mg, Intravenous, Q5 Min PRN, Darrel Hernandez MD, 0.5 mg at 06/28/22 0909    magnesium hydroxide 400 mg/5 ml suspension 2,400 mg, 30 mL, Oral, Daily PRN, Lois Clements PA-C    methocarbamoL tablet 500 mg, 500 mg, Oral, TID, Lois Garnette, PA-C    morphine injection 2 mg, 2 mg, Intravenous, Q4H PRN, Shara Hayward, PARRISH    morphine injection 2 mg, 2 mg, Intravenous, Q4H PRN, Shara Hayward, PARRISH, 2 mg at 06/28/22 0133    ondansetron disintegrating tablet 4 mg, 4 mg, Oral, Q8H PRN, Lois Clements PA-C    ondansetron injection 4 mg, 4 mg, Intravenous, Q4H PRN, Shara Hayward, PARRISH    ondansetron injection 4 mg, 4 mg, Intravenous, Daily PRN, Darrel Hernandez MD    ondansetron injection 4 mg, 4 mg, Intravenous, Q6H PRN, Lois Clements PA-C, 4 mg at 06/28/22 0753    oxyCODONE-acetaminophen  mg per tablet 1 tablet, 1 tablet, Oral, Q4H PRN, Lois Clements PA-C    oxyCODONE-acetaminophen 5-325 mg per tablet 1 tablet, 1 tablet, Oral, Q4H PRN, Lois Clements PA-C    polyethylene glycol packet 17 g, 17 g, Oral, BID PRN, PARRISH Rios    prochlorperazine injection Soln 5 mg, 5 mg, Intravenous, Q6H PRN, PARRISH Rios    senna tablet 8.6 mg, 8.6 mg, Oral, Daily PRN, Lois Clements PA-C    senna-docusate 8.6-50 mg per tablet 1 tablet, 1 tablet, Oral, BID  PRN, Shara Hayward, PARRISH    sodium chloride 0.9% flush 10 mL, 10 mL, Intravenous, PRN, Shara Hayward, SANDRAP    trazodone split tablet 25 mg, 25 mg, Oral, Nightly PRN, SANDRA RiosP      Scheduled Meds:   acetaminophen  1,000 mg Intravenous Once    ceFAZolin (ANCEF) IVPB  2 g Intravenous Q8H    ceFAZolin 2 g/50mL Dextrose IVPB        docusate sodium  100 mg Oral Daily    enoxaparin  40 mg Subcutaneous Daily    methocarbamoL  500 mg Oral TID     Continuous Infusions:  PRN Meds:.acetaminophen, acetaminophen, aluminum-magnesium hydroxide-simethicone, bisacodyL, bisacodyL, ceFAZolin 2 g/50mL Dextrose IVPB, diphenhydrAMINE, famotidine, HYDROmorphone, magnesium hydroxide 400 mg/5 ml, morphine, morphine, ondansetron, ondansetron, ondansetron, ondansetron, oxyCODONE-acetaminophen, oxyCODONE-acetaminophen, polyethylene glycol, prochlorperazine, senna, senna-docusate 8.6-50 mg, sodium chloride 0.9%, trazodone      VTE Prophylaxis: will be placed on appropriate DVT prophylaxis and will be advised to be as mobile as possible and sit in a chair as tolerated  _____________________________________    ASSESSMENT & PLAN:   L intertrochanteric femur fracture, displaced  Fall at home  Essential Hypertension  Hypothyroidism  H/o developmental delay    NPO for now, ortho to take to the OR later today  Plan to resume home meds when back to the floor  Mother has bag of meds with her  Monitor labs and vitals post-op  PT/OT when cleared by ortho  Discussed placement with patient's mother.  Seems like she is in agreement but may need longer term options as she is getting overwhelmed at home.   Case mgmt to be consulted

## 2022-06-28 NOTE — ANESTHESIA PROCEDURE NOTES
Intubation    Date/Time: 6/28/2022 7:26 AM  Performed by: Jeremy Gordon CRNA  Authorized by: Darrel Hernandez MD     Intubation:     Induction:  Intravenous    Intubated:  Postinduction    Mask Ventilation:  N/a    Attempts:  1    Attempted By:  CRNA    Method of Intubation:  Direct    Blade:  Iqbal 2    Laryngeal View Grade: Grade IIA - cords partially seen      Difficult Airway Encountered?: No      Complications:  None    Airway Device:  Oral endotracheal tube    Airway Device Size:  7.5    Style/Cuff Inflation:  Cuffed    Tube secured:  21    Placement Verified By:  Capnometry    Complicating Factors:  None    Findings Post-Intubation:  BS equal bilateral  Notes:      Very poor dentition.

## 2022-06-28 NOTE — PLAN OF CARE
Problem: Physical Therapy  Goal: Physical Therapy Goal  Description: Goals to be met by: 22     Patient will increase functional independence with mobility by performin. Supine to sit with MInimal Assistance  2. Sit to supine with MInimal Assistance  3. Sit to stand transfer with Minimal Assistance  4. Bed to chair transfer with Minimal Assistance using Rolling Walker  5. Gait  x 50 feet with Minimal Assistance using Rolling Walker.     Outcome: Ongoing, Progressing

## 2022-06-28 NOTE — ANESTHESIA PREPROCEDURE EVALUATION
06/28/2022  Nilo Claros Jr. is a 36 y.o., male who suffered femur fracture during ground level fall.  He is quite debilitated and has been falling frequently.  He has a history of remote intracranial tumor and surgery, and has some slow speech and lethargy as a result.  He does seem to answer questions appropriately, is cognizant of situation, asks about rehab etc.  Consent signed with his mother who is present.  She states that he has done well under general anesthesia in the past.      Pre-op Assessment    I have reviewed the Patient Summary Reports.     I have reviewed the Nursing Notes. I have reviewed the NPO Status.   I have reviewed the Medications.     Review of Systems  Anesthesia Hx:  No problems with previous Anesthesia  Denies Family Hx of Anesthesia complications.   Denies Personal Hx of Anesthesia complications.   Cardiovascular:  Cardiovascular Normal Exercise tolerance: poor   Denies Angina.    Pulmonary:  Pulmonary Normal    Neurological:   Neuromuscular Disease, Hx of brain tumor, surgery, speech impediment.       Physical Exam  General: Well nourished, Cooperative, Alert, Oriented, Lethargic and Flat Affect    Airway:  Mallampati: II   Mouth Opening: Normal  TM Distance: Normal  Tongue: Normal  Neck ROM: Normal ROM    Chest/Lungs:  Clear to auscultation, Normal Respiratory Rate    Heart:  Rate: Normal  Rhythm: Regular Rhythm        Anesthesia Plan  Type of Anesthesia, risks & benefits discussed:    Anesthesia Type: Gen ETT  Intra-op Monitoring Plan: Standard ASA Monitors  Post Op Pain Control Plan: multimodal analgesia and IV/PO Opioids PRN  Induction:  IV  Airway Plan: Direct, Post-Induction  Informed Consent: Informed consent signed with the Patient and all parties understand the risks and agree with anesthesia plan.  All questions answered.   ASA Score: 3  Day of Surgery Review  of History & Physical: H&P Update referred to the surgeon/provider.    Ready For Surgery From Anesthesia Perspective.     .

## 2022-06-28 NOTE — OP NOTE
OPERATIVE REPORT    Patient: Nilo Claros Jr.   : 1985    MRN: [id number]  Date: 2022      Surgeon:Chalino Melgar DO  Assistant: Tevin Clements was essential, part of the procedure including deep hardware placement and deep closure.  No senior assistant was availible  Preoperative Diagnosis: : Closed left intertrochanteric femur fracture  Postoperative Diagnosis: Same  Procedure:  Treatment left  intertrochanteric femur fracture with intramedullary nail CPT 60759  Anesthesiologist: Darrel Hernandez MD  OR Staff: Circulator: Frannie Beltran RN  Radiology Technologist: Humble Puri RT  Scrub Person: ST Levi  Implants:   Implant Name Type Inv. Item Serial No.  Lot No. LRB No. Used Action   AMIRAH REAM W/BL TP 2.5MM D 950 - SN/A  AMIRAH REAM W/BL TP 2.5MM D 950 N/A [employer] [id number] Left 1 Implanted and Explanted   BLADE HELICAL PERF GOLD 95MM - SN/A  BLADE HELICAL PERF GOLD 95MM N/A [employer] [id number] Left 1 Implanted   NAIL TFNA 130DEG 11MM L STRL - SN/A  NAIL TFNA 130DEG 11MM L STRL N/A [employer] [id number] Left 1 Implanted   WIRE GUIDE 3.2MM 400MM - SN/A  WIRE GUIDE 3.2MM 400MM N/A [employer] N/A  Left 2 Implanted and Explanted   synthes tfn proximal femoral nailing system 4.2 drill bit qc/ needle point 145 mm   N/A  N/A Left 2 Implanted and Explanted   SCREW XL25 IM NAIL 38X5MM - SN/A  SCREW XL25 IM NAIL 38X5MM N/A [employer] [id number] Left 1 Implanted   SCREW XL25 IM NAIL 42X5MM - SN/A  SCREW XL25 IM NAIL 42X5MM N/A [employer] [id number] Left 1 Implanted     EBL: See anesthesia note  Complications: None  Disposition: To PACU, stable    Indications: Nilo Claros Jr. is a 36 y.o. male presenting with the aforementioned injuries. The procedure is indicated to best obtain and maintain stability of the femur while allowing early ROM.  The patient is MRDD. After thorough discussion of the risks, benefits, expected outcomes, and alternatives to surgical intervention, the  patient's family agreed to proceed with surgical treatment.  Specific risks discussed included, but were not limited to: superficial or deep infection, wound healing complications, DVT/PE, significant bleeding requiring transfusion, damage to named anatomic structures in the immediate area including named neurovascular structures, implant failure, and general risks of anesthesia.  The patient voiced understanding and written as well as verbal consent was obtained by myself prior to the procedure.    Procedure in Detail:  The patient's left lower extremity was marked by me in the preoperative area.  He was taken to the operating room, and after satisfactory induction of anesthesia, the patient was placed in the supine position with a small bump under the ipsilateral hip.   The ipsilateral lower extremity was then sterilely prepped and draped in the usual sterile fashion.  Standard time out procedure was performed confirming the correct surgeon, site, side, patient ID, procedure, and administration of antibiotics.       A 3 cm longitudinal incision was made just proximal to the greater trochanter.  The subcutaneous tissue and gluteal fascia were incised.  A threaded guide pin was then placed in the tip of the greater trochanter and extended and introduced into the proximal femur under AP and lateral fluoroscopy.  The Synthes one-step reamer was then used to ream proximally. A ball-tipped guide jennifer was then placed through the entry site down to the physeal scar of the femur.  This was measured and then was reamed .  A Synthes TFN  appropiate size  was then passed over the guidewire, which was subsequently removed.  The proximal interlocking device was then placed and a 2-cm longitudinal incision was made over the lateral aspect of the proximal thigh and the fascia abdiaziz was incised.  A threaded guide pin was then placed through the lateral cortex of the femur through the femoral neck and into the femoral head in the  center-center position.  A helical blade was then placed under fluoroscopy and satisfactory position was noted on AP and lateral fluoroscopy and the setscrew was then set. The proximal interlocking device was then removed.    Distal interlocking screws were done with  two single lateral to medial interlocking screw under fluoroscopic guidance.  All wounds were then thoroughly irrigated.  Subcutaneous tissues were closed with interrupted inverted of 2-0 Monocryl.  Skin was approximated using skin staples.  Sterile dressing was applied.  General anesthesia was reversed and she was returned to the Postanesthesia Care Unit in stable condition.      All sponge and needle counts were correct at the end of the case.  I was present and participated in all aspects of the procedure.    Prognosis:  The patient will be kept WBAT on the ipsilateral extremity.  DVT prophylaxis is indicated for this patient and procedure.  The patient is at risk of pain, infection, and nonunion, and we will watch for all of these, amongst other issues, as the patient continues to heal.      This note/OR report was created with the assistance of  voice recognition software or phone  dictation.  There may be transcription errors as a result of using this technology however minimal. Effort has been made to assure accuracy of transcription but any obvious errors or omissions should be clarified with the author of the document.       Chalino Melgar, DO  Orthopedic Trauma Surgery

## 2022-06-28 NOTE — CONSULTS
[facility] Tele[facility]  Orthopedic Trauma  Consult Note    Patient Name: Nilo Claros Jr.  MRN: [id number]  Admission Date: 6/28/2022  Hospital Length of Stay: 0 days  Attending Provider: Latasha Fernando DO  Primary Care Provider: Primary Doctor No        Consults  Subjective:         Chief Complaint:  Left hip pain     HPI:  Patient is a 36-year-old male history of a brain injury or tumor.  He has difficulty speaking following instructions.  He has had multiple falls in the previous 3 days.  He was transferred from outside facility.  Patient has a left intertrochanteric femur fracture.  Mother's bedside and consents for the patient    No past medical history on file.    No past surgical history on file.    Review of patient's allergies indicates:   Allergen Reactions    Toradol [ketorolac]        Current Facility-Administered Medications   Medication    acetaminophen tablet 650 mg    acetaminophen tablet 650 mg    aluminum-magnesium hydroxide-simethicone 200-200-20 mg/5 mL suspension 30 mL    bisacodyL suppository 10 mg    cefazolin (ANCEF) 2 gram in dextrose 5% 50 mL IVPB (premix)    ceFAZolin 2 g/50mL Dextrose IVPB (ANCEF) 2 gram/50 mL IVPB PgBk    HYDROcodone-acetaminophen  mg per tablet 1 tablet    morphine injection 2 mg    morphine injection 2 mg    ondansetron injection 4 mg    polyethylene glycol packet 17 g    prochlorperazine injection Soln 5 mg    senna-docusate 8.6-50 mg per tablet 1 tablet    sodium chloride 0.9% flush 10 mL    trazodone split tablet 25 mg     Family History    None       Tobacco Use    Smoking status: Not on file    Smokeless tobacco: Not on file   Substance and Sexual Activity    Alcohol use: Not on file    Drug use: Not on file    Sexual activity: Not on file       ROS:  Constitutional: Denies fever chills  Eyes: No change in vision  ENT: No ringing or current infections  CV: No chest pain  Resp: No labored  breathing  MSK: Pain evident at site of injury located in HPI,   Integ: No signs of abrasions or lacerations  Neuro: No numbness or tingling  Lymphatic: No swelling outside the area of injury   Objective:     Vital Signs (Most Recent):  Temp: 98.3 °F (36.8 °C) (06/28/22 0400)  Pulse: 108 (06/28/22 0713)  Resp: 20 (06/28/22 0713)  BP: 96/67 (06/28/22 0713)  SpO2: (!) 93 % (06/28/22 0713) Vital Signs (24h Range):  Temp:  [98.3 °F (36.8 °C)] 98.3 °F (36.8 °C)  Pulse:  [] 108  Resp:  [18-20] 20  SpO2:  [93 %] 93 %  BP: ()/(67-76) 96/67           There is no height or weight on file to calculate BMI.    No intake or output data in the 24 hours ending 06/28/22 0714    Ortho/SPM Exam  General the patient is alert no acute distress nontoxic-appearing appropriate affect.    Constitutional: Vital signs are examined and stable.  Resp: No signs of labored breathing               LLE: -Skin: No signs of new abrasions or lacerations, no scars           -MSK:  Actively flexes and extends the knee and foot on stimulation           -Neuro:  Sensation grossly intact           -Lymphatic: No signs of lymphadenopathy           -CV: Capillary refill is less than 2 seconds. DP/PT pulses 2/4. Compartments soft and compressible                           Significant Labs: All pertinent labs within the past 24 hours have been reviewed.  Recent Lab Results       06/28/22  0415        Albumin/Globulin Ratio 1.0       Albumin 3.5       Alkaline Phosphatase 146       ALT 63       AST 41       Baso # 0.06       Basophil % 0.6       BILIRUBIN TOTAL 0.8       BUN 11.1       Calcium 9.3       Chloride 105       CO2 26       Creatinine 0.77       eGFR if non African American >60       Eos # 0.29       Eosinophil % 3.0       Globulin, Total 3.4       Glucose 94       Hematocrit 33.5       Hemoglobin 10.4       Immature Grans (Abs) 0.07       Immature Granulocytes 0.7       Lymph # 1.43       LYMPH % 14.8       Magnesium 2.30       MCH  25.9       MCHC 31.0       MCV 83.3       Mono # 0.63       Mono % 6.5       MPV 9.5       Neut # 7.2       Neut % 74.4       nRBC 0.0       Phosphorus 3.8       Platelets 295       Potassium 5.1       PROTEIN TOTAL 6.9       RBC 4.02       RDW 14.4       Sodium 138       WBC 9.7              Significant Imaging: I have reviewed all pertinent imaging results/findings.  No results found.     Assessment/Plan:     Active Diagnoses:    Diagnosis Date Noted POA    PRINCIPAL PROBLEM:  Closed comminuted intertrochanteric fracture of left femur [S72.142A] 06/28/2022 Unknown      Problems Resolved During this Admission:       Patient suffered a fragility fracture left intertrochanteric femur fracture do from a fall from standing.  Mother states he has had multiple falls previously.  Due to his fracture will take him for surgery patient will receive medical clearance later today.  Is low functional level baseline.  He does use tobacco chew.    Plan for surgery today.      This note/OR report was created with the assistance of  voice recognition software or phone  dictation.  There may be transcription errors as a result of using this technology however minimal. Effort has been made to assure accuracy of transcription but any obvious errors or omissions should be clarified with the author of the document.       Chalino Melgar, DO   Orthopedic Trauma Surgery  [facility] - [facility] Tele[facility]

## 2022-06-28 NOTE — PLAN OF CARE
Problem: Occupational Therapy  Goal: Occupational Therapy Goal  Description: Goals to be met by: 7/12/22    Patient will increase functional independence with ADLs by performing:    LE Dressing with modified Starke with AD.  Grooming while standing at sink with Starke.  Toileting from toilet with Starke for hygiene and clothing management.   Toilet transfer to toilet with modified Starke.    Outcome: Ongoing, Progressing   Note edited by MAI Mcdermott

## 2022-06-28 NOTE — PT/OT/SLP EVAL
Physical Therapy Evaluation    Patient Name:  Nilo Claros Jr.   MRN:  [id number]    Recommendations:     Discharge Recommendations:  nursing facility, skilled   Discharge Equipment Recommendations:  (TBD by next level of care)   Barriers to discharge: Increased need of assistance    Assessment:     Nilo Claros Jr. is a 36 y.o. male admitted with a  Closed comminuted intertrochanteric fracture of left femur. He is s/p IM nail. He is to be WBAT. Patient with childhood brain tumor s/p excision with chronic developmental delay.  He lives with his family in mobile home with 6 steps (no rails) to enter. He is mostly independent with ADL's and mobility.  He presents with the following impairments/functional limitations:  weakness, impaired self care skills, impaired functional mobilty, gait instability, impaired balance, impaired cognition, decreased lower extremity function, decreased safety awareness, abnormal tone. He was total assistance for bed mobility. Sitting balance was good once stabilized upright. Stood with RW & MOD A. Patient able to shuffle to right with RW & MIN A. Limited by pain. Had discussion with mom. She is unable to care for patient if he is requiring significant amount of assistance for ADL's and mobility. She would like him to go to SNF until he is more independent. Will attempt to see patient BID with focus on improving mobility. Progress patient as tolerated.     Rehab Prognosis: Good; patient would benefit from acute skilled PT services to address these deficits and reach maximum level of function.    Recent Surgery: Procedure(s) (LRB):  INSERTION, INTRAMEDULLARY AMIRAH, FEMUR (Left) Day of Surgery    Plan:     During this hospitalization, patient to be seen BID to address the identified rehab impairments via gait training, therapeutic activities, therapeutic exercises, neuromuscular re-education and progress toward the following goals:    · Plan of Care Expires:   "07/28/22    Subjective     Chief Complaint: pain  Patient/Family Comments/goals: Mom expressed concerns of caring for patient at his current LOF.   Pain/Comfort:  · Pain Rating 1:  (Unable to rate)  · Location - Side 1: Left  · Location 1: hip  · Pain Addressed 1: Reposition, Distraction, Pre-medicate for activity  · Pain Rating Post-Intervention 1:  (Unable to rate)    Patients cultural, spiritual, Jain conflicts given the current situation: no    Living Environment:  Mobile home with 6 steps to enter.   Prior to admission, patients level of function was "mostly independent" per mom.  Equipment used at home: none.  DME owned (not currently used): none.  Upon discharge, patient will have assistance from TBD.    Objective:     Communicated with nurse prior to session.  Patient found supine with telemetry  upon PT entry to room.    General Precautions: Standard, fall   Orthopedic Precautions:LLE weight bearing as tolerated   Braces: N/A  Respiratory Status: Room air    Exams:  · Cognitive Exam:  Patient is oriented to Person, Place, Time and Situation  · Sensation:    · -       Intact  · RLE ROM: WFL  · RLE Strength: WFL  · LLE ROM: Unable to accurately assess 2/2 pain  · LLE Strength: Unable to accurately assess 2/2 pain. Expect weakness    Functional Mobility:  · Bed Mobility:     · Supine to Sit: total assistance  · Sit to Supine: total assistance  · Transfers:     · Sit to Stand:  moderate assistance with rolling walker  · Gait: Patient able to shuffle to right with RW & MIN A.   · Balance: Fair    AM-PAC 6 CLICK MOBILITY  Total Score:10     Patient left HOB elevated with all lines intact, call button in reach and mother present.    GOALS:   Multidisciplinary Problems     Physical Therapy Goals        Problem: Physical Therapy    Goal Priority Disciplines Outcome Goal Variances Interventions   Physical Therapy Goal     PT, PT/OT Ongoing, Progressing     Description: Goals to be met by: 7/28/22     Patient " will increase functional independence with mobility by performin. Supine to sit with MInimal Assistance  2. Sit to supine with MInimal Assistance  3. Sit to stand transfer with Minimal Assistance  4. Bed to chair transfer with Minimal Assistance using Rolling Walker  5. Gait  x 50 feet with Minimal Assistance using Rolling Walker.                      History:     No past medical history on file.    No past surgical history on file.    Time Tracking:     PT Received On: 22  PT Start Time: 1145     PT Stop Time: 1205  PT Total Time (min): 20 min     Billable Minutes: High Complexity Evaluation 20 minutes      2022

## 2022-06-29 LAB
ABO + RH BLD: NORMAL
ABO + RH BLD: NORMAL
ALBUMIN SERPL-MCNC: 3.2 GM/DL (ref 3.5–5)
ALBUMIN/GLOB SERPL: 1.3 RATIO (ref 1.1–2)
ALP SERPL-CCNC: 90 UNIT/L (ref 40–150)
ALT SERPL-CCNC: 35 UNIT/L (ref 0–55)
AST SERPL-CCNC: 29 UNIT/L (ref 5–34)
BASOPHILS # BLD AUTO: 0.02 X10(3)/MCL (ref 0–0.2)
BASOPHILS NFR BLD AUTO: 0.2 %
BILIRUBIN DIRECT+TOT PNL SERPL-MCNC: 0.4 MG/DL
BLD PROD TYP BPU: NORMAL
BLD PROD TYP BPU: NORMAL
BLOOD UNIT EXPIRATION DATE: NORMAL
BLOOD UNIT EXPIRATION DATE: NORMAL
BLOOD UNIT TYPE CODE: 5100
BLOOD UNIT TYPE CODE: 5100
BUN SERPL-MCNC: 13.5 MG/DL (ref 8.9–20.6)
CALCIUM SERPL-MCNC: 8.3 MG/DL (ref 8.4–10.2)
CHLORIDE SERPL-SCNC: 105 MMOL/L (ref 98–107)
CO2 SERPL-SCNC: 24 MMOL/L (ref 22–29)
CREAT SERPL-MCNC: 0.8 MG/DL (ref 0.73–1.18)
CROSSMATCH INTERPRETATION: NORMAL
CROSSMATCH INTERPRETATION: NORMAL
DISPENSE STATUS: NORMAL
DISPENSE STATUS: NORMAL
EOSINOPHIL # BLD AUTO: 0.01 X10(3)/MCL (ref 0–0.9)
EOSINOPHIL NFR BLD AUTO: 0.1 %
ERYTHROCYTE [DISTWIDTH] IN BLOOD BY AUTOMATED COUNT: 14.5 % (ref 11.5–17)
GLOBULIN SER-MCNC: 2.4 GM/DL (ref 2.4–3.5)
GLUCOSE SERPL-MCNC: 135 MG/DL (ref 74–100)
HCT VFR BLD AUTO: 19.2 % (ref 42–52)
HGB BLD-MCNC: 6.2 GM/DL (ref 14–18)
IMM GRANULOCYTES # BLD AUTO: 0.1 X10(3)/MCL (ref 0–0.02)
IMM GRANULOCYTES NFR BLD AUTO: 0.8 % (ref 0–0.43)
LYMPHOCYTES # BLD AUTO: 0.91 X10(3)/MCL (ref 0.6–4.6)
LYMPHOCYTES NFR BLD AUTO: 7.2 %
MCH RBC QN AUTO: 26.5 PG (ref 27–31)
MCHC RBC AUTO-ENTMCNC: 32.3 MG/DL (ref 33–36)
MCV RBC AUTO: 82.1 FL (ref 80–94)
MONOCYTES # BLD AUTO: 0.59 X10(3)/MCL (ref 0.1–1.3)
MONOCYTES NFR BLD AUTO: 4.7 %
NEUTROPHILS # BLD AUTO: 11 X10(3)/MCL (ref 2.1–9.2)
NEUTROPHILS NFR BLD AUTO: 87 %
NRBC BLD AUTO-RTO: 0 %
PLATELET # BLD AUTO: 259 X10(3)/MCL (ref 130–400)
PMV BLD AUTO: 9.9 FL (ref 9.4–12.4)
POCT GLUCOSE: 105 MG/DL (ref 70–110)
POCT GLUCOSE: 126 MG/DL (ref 70–110)
POTASSIUM SERPL-SCNC: 4.3 MMOL/L (ref 3.5–5.1)
PROT SERPL-MCNC: 5.6 GM/DL (ref 6.4–8.3)
RBC # BLD AUTO: 2.34 X10(6)/MCL (ref 4.7–6.1)
SODIUM SERPL-SCNC: 135 MMOL/L (ref 136–145)
UNIT NUMBER: NORMAL
UNIT NUMBER: NORMAL
WBC # SPEC AUTO: 12.6 X10(3)/MCL (ref 4.5–11.5)

## 2022-06-29 PROCEDURE — 25000003 PHARM REV CODE 250: Performed by: HOSPITALIST

## 2022-06-29 PROCEDURE — 11000001 HC ACUTE MED/SURG PRIVATE ROOM

## 2022-06-29 PROCEDURE — 25000003 PHARM REV CODE 250: Performed by: PHYSICIAN ASSISTANT

## 2022-06-29 PROCEDURE — 80053 COMPREHEN METABOLIC PANEL: CPT | Performed by: PHYSICIAN ASSISTANT

## 2022-06-29 PROCEDURE — 85025 COMPLETE CBC W/AUTO DIFF WBC: CPT | Performed by: PHYSICIAN ASSISTANT

## 2022-06-29 PROCEDURE — A9698 NON-RAD CONTRAST MATERIALNOC: HCPCS | Performed by: HOSPITALIST

## 2022-06-29 PROCEDURE — 63600175 PHARM REV CODE 636 W HCPCS: Performed by: PHYSICIAN ASSISTANT

## 2022-06-29 PROCEDURE — 36430 TRANSFUSION BLD/BLD COMPNT: CPT

## 2022-06-29 PROCEDURE — 92611 MOTION FLUOROSCOPY/SWALLOW: CPT

## 2022-06-29 PROCEDURE — 25000003 PHARM REV CODE 250: Performed by: NURSE PRACTITIONER

## 2022-06-29 PROCEDURE — 92610 EVALUATE SWALLOWING FUNCTION: CPT

## 2022-06-29 PROCEDURE — 36415 COLL VENOUS BLD VENIPUNCTURE: CPT | Performed by: PHYSICIAN ASSISTANT

## 2022-06-29 PROCEDURE — 97535 SELF CARE MNGMENT TRAINING: CPT

## 2022-06-29 PROCEDURE — P9016 RBC LEUKOCYTES REDUCED: HCPCS | Performed by: HOSPITALIST

## 2022-06-29 PROCEDURE — 25500020 PHARM REV CODE 255: Performed by: HOSPITALIST

## 2022-06-29 PROCEDURE — 97530 THERAPEUTIC ACTIVITIES: CPT

## 2022-06-29 RX ORDER — HYDROCODONE BITARTRATE AND ACETAMINOPHEN 500; 5 MG/1; MG/1
TABLET ORAL
Status: DISCONTINUED | OUTPATIENT
Start: 2022-06-29 | End: 2022-07-17 | Stop reason: HOSPADM

## 2022-06-29 RX ADMIN — METHOCARBAMOL 500 MG: 500 TABLET ORAL at 05:06

## 2022-06-29 RX ADMIN — DOCUSATE SODIUM 100 MG: 100 CAPSULE, LIQUID FILLED ORAL at 08:06

## 2022-06-29 RX ADMIN — LEVOTHYROXINE SODIUM 50 MCG: 25 TABLET ORAL at 08:06

## 2022-06-29 RX ADMIN — CEFAZOLIN SODIUM 2 G: 2 SOLUTION INTRAVENOUS at 12:06

## 2022-06-29 RX ADMIN — OXYCODONE AND ACETAMINOPHEN 1 TABLET: 10; 325 TABLET ORAL at 07:06

## 2022-06-29 RX ADMIN — QUETIAPINE FUMARATE 200 MG: 100 TABLET ORAL at 08:06

## 2022-06-29 RX ADMIN — BARIUM SULFATE 10 ML: 0.81 POWDER, FOR SUSPENSION ORAL at 11:06

## 2022-06-29 RX ADMIN — TRAZODONE HYDROCHLORIDE 25 MG: 50 TABLET ORAL at 10:06

## 2022-06-29 RX ADMIN — METHOCARBAMOL 500 MG: 500 TABLET ORAL at 10:06

## 2022-06-29 RX ADMIN — ENOXAPARIN SODIUM 40 MG: 100 INJECTION SUBCUTANEOUS at 05:06

## 2022-06-29 RX ADMIN — ACETAMINOPHEN 650 MG: 325 TABLET, FILM COATED ORAL at 05:06

## 2022-06-29 RX ADMIN — OXYCODONE HYDROCHLORIDE AND ACETAMINOPHEN 1 TABLET: 5; 325 TABLET ORAL at 12:06

## 2022-06-29 RX ADMIN — HYDROXYZINE HYDROCHLORIDE 25 MG: 25 TABLET ORAL at 08:06

## 2022-06-29 RX ADMIN — METHOCARBAMOL 500 MG: 500 TABLET ORAL at 08:06

## 2022-06-29 NOTE — PT/OT/SLP PROGRESS
Occupational Therapy      Patient Name:  Nilo Arambulaev Rodriguez   MRN:  [id number]    Patient not seen today secondary to getting blood tranfusion. Will follow-up 6/30/22.    6/29/2022

## 2022-06-29 NOTE — PROGRESS NOTES
[facility] Hospital Medicine Progress Note        Chief Complaint: Inpatient Follow-up for hip fracture  HPI:   36 year old male with pmhx of childhood brain tumor s/p excision with chornic developmental delay, anxiety, and hypothyroidism presents to the ED after a fall at home.  Mother at bedside providing history as patient unable to participate in conversation.  Mother reports multiple falls lately and she's having greater difficulty caring for him.  States he tripped and fell last night and she was unable to get him off the floor.  EMS was called and he was transported ot the hospital.  XRs revealed L intertrochanteric femur fracture.  Vitals and labs stable and at baseline.  He does not take any blood thinners.  Compliant with home med regimen.  Orthopedics was consulted and the patient was admitted to the hospitalist group     Interval Hx:  Pateint doing much better this morning.  After surgery yesterday was a little lethargic and mother was concerned for potential stroke as he has had one in the past.  CT of thehead was negative for acute findings.  He gradually awakened and now back at baseline.  Pain controlled.  Discussed treatment plan with mother at bedside and aunt over the phone.  Will work with therapy today and we will see what level of services he will require.     Objective/physical exam:  General: In no acute distress, afebrile  Chest: Clear to auscultation bilaterally  Heart: RRR, +S1, S2, no appreciable murmur  Abdomen: Soft, nontender, BS +  MSK: Warm, no lower extremity edema, no clubbing or cyanosis  Neurologic: Developmental delay, cranial nerve II-XII intact, Strength 5/5 in all 4 extremities    VITAL SIGNS: 24 HRS MIN & MAX LAST   Temp  Min: 98 °F (36.7 °C)  Max: 98.8 °F (37.1 °C) 98 °F (36.7 °C)   BP  Min: 86/57  Max: 124/67 124/67   Pulse  Min: 90  Max: 125  (!) 125   Resp  Min: 16  Max: 20 18   SpO2  Min: 94 %  Max: 99 % 99 %       Recent Labs   Lab  06/28/22 0415 06/29/22 0410   WBC 9.7 12.6*   RBC 4.02* 2.34*   HGB 10.4* 6.2*   HCT 33.5* 19.2*   MCV 83.3 82.1   MCH 25.9* 26.5*   MCHC 31.0* 32.3*   RDW 14.4 14.5    259   MPV 9.5 9.9       Recent Labs   Lab 06/28/22 0415 06/29/22 0410    135*   K 5.1 4.3   CO2 26 24   BUN 11.1 13.5   CREATININE 0.77 0.80   CALCIUM 9.3 8.3*   MG 2.30  --    ALBUMIN 3.5 3.2*   ALKPHOS 146 90   ALT 63* 35   AST 41* 29   BILITOT 0.8 0.4          Microbiology Results (last 7 days)     ** No results found for the last 168 hours. **           See below for Radiology    Scheduled Med:   docusate sodium  100 mg Oral Daily    enoxaparin  40 mg Subcutaneous Daily    hydrOXYzine HCL  25 mg Oral Daily    levothyroxine  50 mcg Oral Daily    methocarbamoL  500 mg Oral TID    QUEtiapine  200 mg Oral Daily        Continuous Infusions:       PRN Meds:  sodium chloride, acetaminophen, acetaminophen, aluminum-magnesium hydroxide-simethicone, bisacodyL, bisacodyL, ceFAZolin 2 g/50mL Dextrose IVPB, diphenhydrAMINE, famotidine, HYDROmorphone, magnesium hydroxide 400 mg/5 ml, morphine, morphine, ondansetron, ondansetron, ondansetron, ondansetron, oxyCODONE-acetaminophen, oxyCODONE-acetaminophen, polyethylene glycol, prochlorperazine, senna, senna-docusate 8.6-50 mg, sodium chloride 0.9%, trazodone       Assessment/Plan:   L intertrochanteric femur fracture, displaced  Fall at home  Essential Hypertension  Hypothyroidism  H/o developmental delay     Minor bump in leukocytosis likely post-operative  Monitor for now.  He's back on home psych regimen  PT/OT to work with him again today  CM consulted for placement  He did have a significant drop in hemoglobin overnight.  Ortho has already ordered transfusion of 2 units. Will monitor counts over the next 24 hours. No signs of further blood loss  Follow up iron panel as well    Critical care diagnosis: critical anemia requiring blood transfusion  Critical care interventions: hands on  evaluation, review of labs/radiographs/records and discussions with family  Critical care time spent: >32 minutes    Patient condition:  Stable    Anticipated discharge and Disposition: TBD      All diagnosis and differential diagnosis have been reviewed; assessment and plan has been documented; I have personally reviewed the labs and test results that are presently available; I have reviewed the patients medication list; I have reviewed the consulting providers response and recommendations. I have reviewed or attempted to review medical records based upon their availability    All of the patient's questions have been  addressed and answered. Patient's is agreeable to the above stated plan. I will continue to monitor closely and make adjustments to medical management as needed.  _____________________________________________________________________      Radiology:  Fl Modified Barium Swallow Speech  See Speech Therapist Notes    This procedure was auto-finalized.      Clemenet Patel MD   06/29/2022

## 2022-06-29 NOTE — PROGRESS NOTES
[facility] - [facility] Medical Telemetry  Orthopedics  Progress Note    Patient Name: Nilo Claros Jr.  MRN: [id number]  Admission Date: 6/28/2022  Hospital Length of Stay: 1 days  Attending Provider: Clemente Patel MD  Primary Care Provider: Primary Doctor No  Follow-up For: Procedure(s) (LRB):  INSERTION, INTRAMEDULLARY AMIRAH, FEMUR (Left)    Post-Operative Day: 1 Day Post-Op  Subjective:     Principal Problem:Closed comminuted intertrochanteric fracture of left femur    Principal Orthopedic Problem: 1 Day Post-Op IMN left IT femur fx    Interval History: Patient is doing well this morning. States that pain has been well controlled. Was able to stand and take a few steps through the room with therapy yesterday. H&H is down to 6.2 this morning. Primary team in the room upon rounds this morning. States they are ordering transfusion this morning. Overall doing well. Mom is concerned about mobility and believes he will benefit from short stay in rehab.    Review of patient's allergies indicates:   Allergen Reactions    Toradol [ketorolac]        Current Facility-Administered Medications   Medication    0.9%  NaCl infusion (for blood administration)    acetaminophen 1,000 mg/100 mL (10 mg/mL) injection 1,000 mg    acetaminophen tablet 650 mg    acetaminophen tablet 650 mg    aluminum-magnesium hydroxide-simethicone 200-200-20 mg/5 mL suspension 30 mL    bisacodyL suppository 10 mg    bisacodyL suppository 10 mg    cefazolin (ANCEF) 2 gram in dextrose 5% 50 mL IVPB (premix)    cefazolin (ANCEF) 2 gram in dextrose 5% 50 mL IVPB (premix)    diphenhydrAMINE injection 25 mg    docusate sodium capsule 100 mg    enoxaparin injection 40 mg    famotidine tablet 20 mg    HYDROmorphone (PF) injection 0.5 mg    hydrOXYzine HCL tablet 25 mg    levothyroxine tablet 50 mcg    magnesium hydroxide 400 mg/5 ml suspension 2,400 mg    methocarbamoL tablet 500 mg    morphine injection 2 mg    morphine  injection 2 mg    ondansetron disintegrating tablet 4 mg    ondansetron injection 4 mg    ondansetron injection 4 mg    ondansetron injection 4 mg    oxyCODONE-acetaminophen  mg per tablet 1 tablet    oxyCODONE-acetaminophen 5-325 mg per tablet 1 tablet    polyethylene glycol packet 17 g    prochlorperazine injection Soln 5 mg    QUEtiapine tablet 200 mg    senna tablet 8.6 mg    senna-docusate 8.6-50 mg per tablet 1 tablet    sodium chloride 0.9% flush 10 mL    trazodone split tablet 25 mg     Objective:     Vital Signs (Most Recent):  Temp: 98.3 °F (36.8 °C) (06/29/22 0752)  Pulse: (!) 115 (06/29/22 0752)  Resp: 19 (06/29/22 0752)  BP: 104/68 (06/29/22 0752)  SpO2: 98 % (06/29/22 0752) Vital Signs (24h Range):  Temp:  [97.5 °F (36.4 °C)-98.8 °F (37.1 °C)] 98.3 °F (36.8 °C)  Pulse:  [] 115  Resp:  [15-20] 19  SpO2:  [71 %-100 %] 98 %  BP: ()/(57-74) 104/68           There is no height or weight on file to calculate BMI.      Intake/Output Summary (Last 24 hours) at 6/29/2022 0813  Last data filed at 6/28/2022 2203  Gross per 24 hour   Intake 1540 ml   Output 1200 ml   Net 340 ml       Physical Exam:   Musculoskeletal:     Left lower extremity: Dressing is clean and dry with minimal drainage this morning; compartments are soft and compressible; Tolerates passive range of motion at the ankle, knee, and hip; appropriate tenderness to palpation; dorsi/plantar flexes the foot; SILT distally; BCR distally; DP pulse palpable      Diagnostic Findings:   Significant Labs:   Recent Lab Results  (Last 5 results in the past 72 hours)      06/29/22  0410   06/28/22  2337   06/28/22  1750   06/28/22  1125   06/28/22  1004        Unit Blood Type Code         5100  [P]                5100  [P]       Unit Expiration         [id number]  [P]                [id number]  [P]       Albumin/Globulin Ratio 1.3               Albumin 3.2               Alkaline Phosphatase 90               ALT 35                AST 29               Baso # 0.02               Basophil % 0.2               BILIRUBIN TOTAL 0.4               BUN 13.5               Calcium 8.3               Chloride 105               CO2 24               Creatinine 0.80               CROSSMATCH INTERPRETATION         Compatible  [P]                Compatible  [P]       DISPENSE STATUS         Selected  [P]                Selected  [P]       eGFR if non  >60               Eos # 0.01               Eosinophil % 0.1               Globulin, Total 2.4               Glucose 135               Group & Rh         O POS       Hematocrit 19.2               Hemoglobin 6.2               Immature Grans (Abs) 0.10               Immature Granulocytes 0.8               Indirect Sonali GEL         NEG       Lymph # 0.91               LYMPH % 7.2               MCH 26.5               MCHC 32.3               MCV 82.1               Mono # 0.59               Mono % 4.7               MPV 9.9               Neut # 11.0               Neut % 87.0               nRBC 0.0               Platelets 259               POCT Glucose   297   196   126         Potassium 4.3               Product Code         [id number]  [P]                [id number]  [P]       PROTEIN TOTAL 5.6               RBC 2.34               RDW 14.5               Sodium 135               Unit ABO/Rh         O POS  [P]                O POS  [P]       UNIT NUMBER         [id number]  [P]                [id number]  [P]       WBC 12.6                                     [P] - Preliminary Result              Significant Imaging: I have reviewed and interpreted all pertinent imaging results/findings.     Assessment/Plan:     Active Diagnoses:    Diagnosis Date Noted POA    PRINCIPAL PROBLEM:  Closed comminuted intertrochanteric fracture of left femur [S72.142A] 06/28/2022 Yes      Problems Resolved During this Admission:   Agree with plan for transfusion this morning.   WBAT and ROMAT. Continue to work with therapy  on mobilizing.   Will likely benefit from rehab placement.   Continue multimodal pain control as needed. Daily dry dressing changes as needed beginning tomorrow.   Will continue to follow.   Follow up with Dr. Melgar in approx 3 weeks for repeat x-rays and evaluation. No further orthopedic surgery planned at this time.     The above findings, diagnostics, and treatment plan were discussed with Dr. Melgar who is in agreement with the plan of care except as stated in additional documentation.      Lois Clements PA-C  Orthopedic Trauma Surgery  [facility]

## 2022-06-29 NOTE — PROCEDURES
"Speech Language Pathology Department  Modified Barium Swallow Study    Patient Name:  Nilo Claros Jr.   MRN:  [id number]  Diagnosis: Closed comminuted intertrochanteric fracture of left femur     Recommendations:                 General Recommendations:  Dysphagia therapy   Repeat MBS study: n/a  Diet recommendations:  Soft & Bite Sized Diet - IDDSI Level 6, Liquid Diet Level: Thin liquids - IDDSI Level 0   Swallow Strategies/Precautions: small bites/sips, alternate solids/liquids and medications crushed in puree  General Precautions: Standard, aspiration    History:     A Modified Barium Swallow Study was completed to assess the efficiency of his/her swallow function, rule out aspiration and make recommendations regarding safe dietary consistencies, effective compensatory strategies, and safe eating environment.     Hx brain tumor removal, x2 CVAs.    Home Diet: Regular and thin liquids  Current Method of Nutrition: NPO    Patient complaint: "I'm hungry."    Subjective:     Patient awake, alert and oriented x3 (disoriented to year).    Respiratory Status: room air      Fluoroscopic Results:     Oral Musculature Evaluation:   Oral Musculature: WFL   Dentition: gums in poor condition   Voice Prior to PO Intake: adequate    Visualization  · Patient was seen in the lateral view    Oral Phase:    Prolonged mastication   Loss of bolus control    Pharyngeal Phase:    Swallow delay with spill to the pyriform sinuses   Reduced base of tongue retraction   Base of tongue residue mild   Posterior pharyngeal wall residue mild   Vallecular residue mild   Pyriform sinus residue mild  Consistency Laryngeal Penetration Aspiration Residue Strategy   Mildly thick by cup none none Base of tongue, vallecular, pyriform sinus, mild    puree none none Base of tongue, vallecular, pyriform sinus, mild Reduced with additional swallow   Chewable solid none none Base of tongue, vallecular, pyriform sinus, pyriform sinuses, " mild Reduced with liquid wash   Thin via cup none none Base of tongue, vallecular, pyriform sinus, pyriform sinuses, mild Reduced with additional swallow   Thin via straw none none Base of tongue, vallecular, pyriform sinus, pyriform sinuses, mild Reduced with additional swallow     Cervical Esophageal Phase:    UES appeared to accommodate all bolus types without stasis or retrograde movement visualized        Assessment:     The pt presents with mild-moderate oropharyngeal dysphagia characterized by prolonged mastication (negatively impacted by lack of dentition), loss of bolus control, reduced base of tongue retraction, and swallow delay with prespill to the pyriform sinuses resulting in mild oropharyngeal residue that was reduced with an additional swallow and with a liquid wash (after solid intake).  No laryngeal penetration or aspiration visualized during this study, however pt at increased risk.  Skilled SLP services warranted to advance solids as appropriate.    MBS results discussed with pt and mother at bedside.  Discussed safe eating strategies (Small bites and sips, alternate solids and liquids, crush meds in pudding), mother verbalized understanding.  Whiteboard updated.    Goals:   Multidisciplinary Problems     SLP Goals        Problem: SLP    Goal Priority Disciplines Outcome   SLP Goal     SLP Ongoing, Progressing   Description: LTG: Pt will tolerate least restrictive PO diet with no clinical signs/sx aspiration    STGs:  Pt will tolerate easy to chew solids with improved bolus formation/transport  Pt will complete laryngeal strengthening exercises and ryan maneuver with minimal-moderate cues.  Pt will tolerate thermal stimulation to the anterior faucial pillars with 100% effortful swallows and delay less than 2 seconds.                    Plan:     Patient to be seen:  5 x/week   Plan of Care expires:  07/27/22  Plan of Care reviewed with:  patient, mother   SLP Follow-Up:  Yes      Time  Tracking:     SLP Treatment Date:   06/29/22  Speech Start Time:  1100  Speech Stop Time:  1140     Speech Total Time (min):  40 min    Billable minutes: Motion Fluoroscopic Evaluation, Video Recording, 30 minutes  Self Care/Home Management, 10 minutes       06/29/2022

## 2022-06-29 NOTE — PT/OT/SLP EVAL
"Speech Language Pathology Evaluation  Bedside Swallow    Patient Name:  Nilo Claros Jr.   MRN:  [id number]  Admitting Diagnosis: Closed comminuted intertrochanteric fracture of left femur    Recommendations:                 General Recommendations:  Modified barium swallow study  Diet recommendations:  NPO, NPO   Aspiration Precautions: Meds crushed in puree   General Precautions: Standard, aspiration  Communication strategies:  none    History:     No past medical history on file.    No past surgical history on file.    Social History: Patient lives with mother.    Prior diet: regular solids and thin liquids.      Subjective     Staff reports coughing with liquids and pocketing of solids.  Mother reports coughing episodes with PO intake.    Patient goals: "I'm super hungry."     Pain/Comfort:  · Pain Rating 1: 0/10    Respiratory Status: Room air    Objective:     Oral Musculature Evaluation  · Oral Musculature: WFL  · Voice Prior to PO Intake: adequate     Extensive oral care completed, possible chewing tobacco noted throughout oral cavity.  Nursing notified.    Bedside Swallow Eval:   Consistencies Assessed:  · Puree via spoon   · Ice chips via spoon    Oral Phase:   · bolus holding    Pharyngeal Phase:   · multiple spontaneous swallows  · throat clearing    Compensatory Strategies  · None    Assessment:     Nilo Claros Jr. is a 36 y.o. male with an SLP diagnosis of possible oropharygneal dysphagia.  Rec:  NPO, ok for meds crushed in pudding.  Will proceed with MBS to assess current swallow function.    Discussed with pt and mother who verbalized understanding of NPO until MBS completed.    Goals:   Multidisciplinary Problems     SLP Goals     Not on file                Plan:     · Patient to be seen:      · Plan of Care expires:     · Plan of Care reviewed with:  patient, mother   · SLP Follow-Up:          Discharge recommendations:      Barriers to Discharge:  severity of impairment    Time " Tracking:     SLP Treatment Date:   06/29/22  Speech Start Time:  1030  Speech Stop Time:  1050     Speech Total Time (min):  20 min    Billable Minutes: Eval Swallow and Oral Function 20 minutes    06/29/2022

## 2022-06-29 NOTE — PLAN OF CARE
Problem: SLP  Goal: SLP Goal  Description: LTG: Pt will tolerate least restrictive PO diet with no clinical signs/sx aspiration    STGs:  Pt will tolerate easy to chew solids with improved bolus formation/transport  Pt will complete laryngeal strengthening exercises and ryan maneuver with minimal-moderate cues.  Pt will tolerate thermal stimulation to the anterior faucial pillars with 100% effortful swallows and delay less than 2 seconds.   Outcome: Ongoing, Progressing       POC initiated and goals created.  Lupis

## 2022-06-29 NOTE — PT/OT/SLP PROGRESS
Physical Therapy Treatment    Patient Name:  Nilo Claros Jr.   MRN:  [id number]    Recommendations:     Discharge Recommendations:  nursing facility, skilled, rehabilitation facility   Discharge Equipment Recommendations: bedside commode, bath bench   Barriers to discharge: Increased need of assistance    Assessment:     Nilo Claros Jr. is a 36 y.o. male admitted with a medical diagnosis of Closed comminuted intertrochanteric fracture of left femur.  He presents with the following impairments/functional limitations:  weakness, impaired endurance, impaired functional mobilty, impaired self care skills, gait instability, impaired balance, decreased safety awareness, decreased lower extremity function. Progressing with PT. Still requiring significant assistance for mobility. Need rehab vs SNF placement.     Rehab Prognosis: Good; patient would benefit from acute skilled PT services to address these deficits and reach maximum level of function.    Recent Surgery: Procedure(s) (LRB):  INSERTION, INTRAMEDULLARY AMIRAH, FEMUR (Left) 1 Day Post-Op    Plan:     During this hospitalization, patient to be seen BID to address the identified rehab impairments via gait training, therapeutic activities, therapeutic exercises, neuromuscular re-education and progress toward the following goals:    · Plan of Care Expires:  07/28/22    Subjective     Chief Complaint: pain  Patient/Family Comments/goals: none  Pain/Comfort:  · Pain Rating 1: 6/10  · Location - Side 1: Left  · Location 1: hip  · Pain Addressed 1: Distraction, Reposition, Pre-medicate for activity  · Pain Rating Post-Intervention 1: 7/10      Objective:     Communicated with nurse prior to session.  Patient found supine with telemetry upon PT entry to room.     General Precautions: Standard, fall   Orthopedic Precautions:LLE weight bearing as tolerated   Braces: N/A  Respiratory Status: Room air     Functional Mobility:  · Bed Mobility:     · Supine to Sit:  moderate assistance  · Transfers:     · Sit to Stand:  moderate assistance with rolling walker  · Gait: 38 ft with RW & CGA.   · Balance: fair/poor      AM-PAC 6 CLICK MOBILITY  Turning over in bed (including adjusting bedclothes, sheets and blankets)?: 2  Sitting down on and standing up from a chair with arms (e.g., wheelchair, bedside commode, etc.): 2  Moving from lying on back to sitting on the side of the bed?: 2  Moving to and from a bed to a chair (including a wheelchair)?: 2  Need to walk in hospital room?: 3  Climbing 3-5 steps with a railing?: 1  Basic Mobility Total Score: 12       Therapeutic Activities and Exercises:   Theract:   1)Bed mobility: Moderate assistance. Increased time to complete task.   2)Sit<>stand: Moderate assistance. Cueing for technique.   3)Gait: 38 ft with RW & CGA. Decreased weight shift on to LLE. Seated rest breaks.     Patient left up in chair with all lines intact, call button in reach, mom present and educated on calling for assistance when ready to return to bed. ..    GOALS:   Multidisciplinary Problems     Physical Therapy Goals        Problem: Physical Therapy    Goal Priority Disciplines Outcome Goal Variances Interventions   Physical Therapy Goal     PT, PT/OT Ongoing, Progressing     Description: Goals to be met by: 22     Patient will increase functional independence with mobility by performin. Supine to sit with MInimal Assistance  2. Sit to supine with MInimal Assistance  3. Sit to stand transfer with Minimal Assistance  4. Bed to chair transfer with Minimal Assistance using Rolling Walker  5. Gait  x 50 feet with Minimal Assistance using Rolling Walker.                      Time Tracking:     PT Received On: 22  PT Start Time: 818     PT Stop Time: 843  PT Total Time (min): 25 min     Billable Minutes: Therapeutic Activity 25minutes    Treatment Type: Treatment  PT/PTA: PT     PTA Visit Number: 2022

## 2022-06-29 NOTE — PLAN OF CARE
06/29/22 0754   Discharge Assessment   Assessment Type Discharge Planning Assessment   Confirmed/corrected address, phone number and insurance Yes   Confirmed Demographics Correct on Facesheet   Source of Information family   Communicated RODRI with patient/caregiver Yes   Lives With parent(s)   Do you expect to return to your current living situation? No   Do you have help at home or someone to help you manage your care at home? Yes   Who are your caregiver(s) and their phone number(s)? Mother, Briana   Prior to hospitilization cognitive status: Alert/Oriented   Current cognitive status: Alert/Oriented   Walking or Climbing Stairs Difficulty none   Dressing/Bathing Difficulty bathing difficulty, assistance 1 person   Do you have any problems with: Errands/Grocery   Home Accessibility wheelchair accessible   Home Layout Able to live on 1st floor   Equipment Currently Used at Home walker, rolling   Readmission within 30 days? No   Patient currently being followed by outpatient case management? No   Do you currently have service(s) that help you manage your care at home? No   Do you take prescription medications? Yes   Do you have prescription coverage? Yes   Do you have any problems affording any of your prescribed medications? No   Is the patient taking medications as prescribed? yes   How do you get to doctors appointments? family or friend will provide   Are you on dialysis? No   Do you take coumadin? No   Discharge Plan A Skilled Nursing Facility   Discharge Plan B Home;Home with family;Home Health   DME Needed Upon Discharge  none   Discharge Plan discussed with: Parent(s)

## 2022-06-29 NOTE — PT/OT/SLP PROGRESS
Physical Therapy      Patient Name:  Nilo Claros    MRN:  [id number]    Patient sleeping & receiving blood. PT to f/u tomorrow.

## 2022-06-30 LAB
ALBUMIN SERPL-MCNC: 3.2 GM/DL (ref 3.5–5)
ALBUMIN/GLOB SERPL: 1.2 RATIO (ref 1.1–2)
ALP SERPL-CCNC: 97 UNIT/L (ref 40–150)
ALT SERPL-CCNC: 32 UNIT/L (ref 0–55)
AST SERPL-CCNC: 33 UNIT/L (ref 5–34)
BASOPHILS # BLD AUTO: 0.06 X10(3)/MCL (ref 0–0.2)
BASOPHILS NFR BLD AUTO: 0.5 %
BILIRUBIN DIRECT+TOT PNL SERPL-MCNC: 0.8 MG/DL
BUN SERPL-MCNC: 12.6 MG/DL (ref 8.9–20.6)
CALCIUM SERPL-MCNC: 8.5 MG/DL (ref 8.4–10.2)
CHLORIDE SERPL-SCNC: 105 MMOL/L (ref 98–107)
CO2 SERPL-SCNC: 29 MMOL/L (ref 22–29)
CREAT SERPL-MCNC: 0.73 MG/DL (ref 0.73–1.18)
EOSINOPHIL # BLD AUTO: 0.24 X10(3)/MCL (ref 0–0.9)
EOSINOPHIL NFR BLD AUTO: 2.1 %
ERYTHROCYTE [DISTWIDTH] IN BLOOD BY AUTOMATED COUNT: 15.9 % (ref 11.5–17)
FERRITIN SERPL-MCNC: 83.96 NG/ML (ref 21.81–274.66)
FOLATE SERPL-MCNC: 3.8 NG/ML (ref 7–31.4)
GLOBULIN SER-MCNC: 2.7 GM/DL (ref 2.4–3.5)
GLUCOSE SERPL-MCNC: 101 MG/DL (ref 74–100)
HCT VFR BLD AUTO: 27.2 % (ref 42–52)
HGB BLD-MCNC: 8.8 GM/DL (ref 14–18)
IMM GRANULOCYTES # BLD AUTO: 0.12 X10(3)/MCL (ref 0–0.02)
IMM GRANULOCYTES NFR BLD AUTO: 1 % (ref 0–0.43)
IRON SERPL-MCNC: 74 UG/DL (ref 65–175)
LYMPHOCYTES # BLD AUTO: 1.89 X10(3)/MCL (ref 0.6–4.6)
LYMPHOCYTES NFR BLD AUTO: 16.4 %
MCH RBC QN AUTO: 27.3 PG (ref 27–31)
MCHC RBC AUTO-ENTMCNC: 32.4 MG/DL (ref 33–36)
MCV RBC AUTO: 84.5 FL (ref 80–94)
MONOCYTES # BLD AUTO: 0.7 X10(3)/MCL (ref 0.1–1.3)
MONOCYTES NFR BLD AUTO: 6.1 %
NEUTROPHILS # BLD AUTO: 8.5 X10(3)/MCL (ref 2.1–9.2)
NEUTROPHILS NFR BLD AUTO: 73.9 %
NRBC BLD AUTO-RTO: 0 %
PLATELET # BLD AUTO: 300 X10(3)/MCL (ref 130–400)
PMV BLD AUTO: 9.7 FL (ref 7.4–10.4)
POTASSIUM SERPL-SCNC: 4.1 MMOL/L (ref 3.5–5.1)
PROT SERPL-MCNC: 5.9 GM/DL (ref 6.4–8.3)
RBC # BLD AUTO: 3.22 X10(6)/MCL (ref 4.7–6.1)
SODIUM SERPL-SCNC: 138 MMOL/L (ref 136–145)
VIT B12 SERPL-MCNC: 704 PG/ML (ref 213–816)
WBC # SPEC AUTO: 11.5 X10(3)/MCL (ref 4.5–11.5)

## 2022-06-30 PROCEDURE — 97535 SELF CARE MNGMENT TRAINING: CPT

## 2022-06-30 PROCEDURE — 82607 VITAMIN B-12: CPT | Performed by: NURSE PRACTITIONER

## 2022-06-30 PROCEDURE — 82746 ASSAY OF FOLIC ACID SERUM: CPT | Performed by: NURSE PRACTITIONER

## 2022-06-30 PROCEDURE — 93010 EKG 12-LEAD: ICD-10-PCS | Mod: ,,, | Performed by: INTERNAL MEDICINE

## 2022-06-30 PROCEDURE — 25000003 PHARM REV CODE 250: Performed by: HOSPITALIST

## 2022-06-30 PROCEDURE — 63600175 PHARM REV CODE 636 W HCPCS: Performed by: PHYSICIAN ASSISTANT

## 2022-06-30 PROCEDURE — 25000003 PHARM REV CODE 250: Performed by: PHYSICIAN ASSISTANT

## 2022-06-30 PROCEDURE — 36415 COLL VENOUS BLD VENIPUNCTURE: CPT | Performed by: NURSE PRACTITIONER

## 2022-06-30 PROCEDURE — 85025 COMPLETE CBC W/AUTO DIFF WBC: CPT | Performed by: PHYSICIAN ASSISTANT

## 2022-06-30 PROCEDURE — 80053 COMPREHEN METABOLIC PANEL: CPT | Performed by: PHYSICIAN ASSISTANT

## 2022-06-30 PROCEDURE — 92526 ORAL FUNCTION THERAPY: CPT

## 2022-06-30 PROCEDURE — 93005 ELECTROCARDIOGRAM TRACING: CPT

## 2022-06-30 PROCEDURE — 83540 ASSAY OF IRON: CPT | Performed by: NURSE PRACTITIONER

## 2022-06-30 PROCEDURE — 97110 THERAPEUTIC EXERCISES: CPT

## 2022-06-30 PROCEDURE — 97530 THERAPEUTIC ACTIVITIES: CPT

## 2022-06-30 PROCEDURE — 93010 ELECTROCARDIOGRAM REPORT: CPT | Mod: ,,, | Performed by: INTERNAL MEDICINE

## 2022-06-30 PROCEDURE — 97116 GAIT TRAINING THERAPY: CPT | Mod: CQ

## 2022-06-30 PROCEDURE — 82728 ASSAY OF FERRITIN: CPT | Performed by: NURSE PRACTITIONER

## 2022-06-30 PROCEDURE — 11000001 HC ACUTE MED/SURG PRIVATE ROOM

## 2022-06-30 RX ORDER — QUETIAPINE FUMARATE 100 MG/1
200 TABLET, FILM COATED ORAL NIGHTLY
Status: DISCONTINUED | OUTPATIENT
Start: 2022-06-30 | End: 2022-07-17 | Stop reason: HOSPADM

## 2022-06-30 RX ORDER — ALPRAZOLAM 0.5 MG/1
0.5 TABLET ORAL 3 TIMES DAILY PRN
Status: DISCONTINUED | OUTPATIENT
Start: 2022-06-30 | End: 2022-07-17 | Stop reason: HOSPADM

## 2022-06-30 RX ORDER — DIPHENHYDRAMINE HCL 25 MG
25 CAPSULE ORAL EVERY 6 HOURS PRN
Status: DISCONTINUED | OUTPATIENT
Start: 2022-06-30 | End: 2022-07-17 | Stop reason: HOSPADM

## 2022-06-30 RX ORDER — METOPROLOL TARTRATE 1 MG/ML
5 INJECTION, SOLUTION INTRAVENOUS
Status: DISCONTINUED | OUTPATIENT
Start: 2022-06-30 | End: 2022-07-17 | Stop reason: HOSPADM

## 2022-06-30 RX ADMIN — METHOCARBAMOL 500 MG: 500 TABLET ORAL at 08:06

## 2022-06-30 RX ADMIN — METOPROLOL TARTRATE 5 MG: 5 INJECTION, SOLUTION INTRAVENOUS at 08:06

## 2022-06-30 RX ADMIN — METHOCARBAMOL 500 MG: 500 TABLET ORAL at 03:06

## 2022-06-30 RX ADMIN — OXYCODONE AND ACETAMINOPHEN 1 TABLET: 10; 325 TABLET ORAL at 08:06

## 2022-06-30 RX ADMIN — DIPHENHYDRAMINE HYDROCHLORIDE 25 MG: 25 CAPSULE ORAL at 08:06

## 2022-06-30 RX ADMIN — ENOXAPARIN SODIUM 40 MG: 100 INJECTION SUBCUTANEOUS at 05:06

## 2022-06-30 RX ADMIN — DOCUSATE SODIUM 100 MG: 100 CAPSULE, LIQUID FILLED ORAL at 08:06

## 2022-06-30 RX ADMIN — OXYCODONE AND ACETAMINOPHEN 1 TABLET: 10; 325 TABLET ORAL at 04:06

## 2022-06-30 RX ADMIN — HYDROXYZINE HYDROCHLORIDE 25 MG: 25 TABLET ORAL at 08:06

## 2022-06-30 RX ADMIN — LEVOTHYROXINE SODIUM 50 MCG: 25 TABLET ORAL at 08:06

## 2022-06-30 RX ADMIN — DIPHENHYDRAMINE HYDROCHLORIDE 25 MG: 25 CAPSULE ORAL at 07:06

## 2022-06-30 RX ADMIN — DIPHENHYDRAMINE HYDROCHLORIDE 25 MG: 25 CAPSULE ORAL at 01:06

## 2022-06-30 RX ADMIN — OXYCODONE AND ACETAMINOPHEN 1 TABLET: 10; 325 TABLET ORAL at 12:06

## 2022-06-30 RX ADMIN — QUETIAPINE 200 MG: 100 TABLET ORAL at 08:06

## 2022-06-30 RX ADMIN — OXYCODONE AND ACETAMINOPHEN 1 TABLET: 10; 325 TABLET ORAL at 03:06

## 2022-06-30 NOTE — PT/OT/SLP PROGRESS
Occupational Therapy   Treatment    Name: Nilo Claros Jr.  MRN: [id number]  Admitting Diagnosis:  Closed comminuted intertrochanteric fracture of left femur  2 Days Post-Op    Recommendations:     Discharge Recommendations: rehabilitation facility  Discharge Equipment Recommendations:  bedside commode, walker, rolling, bath bench  Barriers to discharge:  Decreased caregiver support (Pts mom stated that it has been harder to care for him at home)    Assessment:     Nilo Claros Jr. is a 36 y.o. male with a medical diagnosis of Closed comminuted intertrochanteric fracture of left femur.  Performance deficits affecting function are impaired self care skills, impaired functional mobilty, decreased lower extremity function, impaired cognition, pain.     Rehab Prognosis:  Good; patient would benefit from acute skilled OT services to address these deficits and reach maximum level of function.       Plan:     Patient to be seen 5 x/week, daily to address the above listed problems via self-care/home management  Plan of Care Expires: 07/14/22  Plan of Care Reviewed with: patient, daughter    Subjective     Pain/Comfort:  Pain Rating 1: 6/10  Location - Side 1: Left  Location 1: leg  Pain Addressed 1: Reposition, Distraction, Cessation of Activity    Objective:     Communicated with: RN prior to session.  Patient found supine with peripheral IV, telemetry upon OT entry to room.    General Precautions: Standard, fall   Orthopedic Precautions:LUE weight bearing as tolerated (ROMAT LLE)   Braces:    Respiratory Status: Room air     Occupational Performance:     Bed Mobility:    Patient completed Supine to Sit with minimum assistance     Functional Mobility/Transfers:  Patient completed Sit <> Stand Transfer with minimum assistance  with  rolling walker   Patient completed Toilet Transfer Step Transfer technique with minimum assistance with rolling walker  Functional Mobility: Pt able to perform sit to stand with min  assistance and reports of pain (6/10), but tolerated ambulation to bathroom using RW. Pt able to understand and carryout directions, but required repeated directions and moderate verbal cues for RW maneuvering and proper hand placement. Pt able to ambulate back to the chair. Pt needed verbal cues to maintain RW on the floor versus carrying it while walking. Pt noted to have impulsivity.     Activities of Daily Living:  Upper Body Dressing: minimum assistance pt able to don gown requiring min assistance, pt noted to be able to put hands through sleeves on his own and fix gown onself.   Lower Body Dressing: minimum assistance Pt required min assistance to don boxers using a reacher. Pt educated on sock aid, required min assistance for sock placement.   Toileting: minimum assistance pt able to perform toileting with min assist for safety and balance.       Treatment & Education:  OT educated pt and mom on POC, both verbalized understanding. Pt noted to be more awake and alert more. Pt communicated well with therapist throughout session. Pt stated that his pain was at a 6/10 before activtiy and after it was at a 6 1/2. Pts HR noted to reach 153 during activity, once patient was seated it decreased to 116. Pt educated on use of reacher, pt able to hold trigger in place but required assistance with placing L leg in proper place. Pt demonstrated signs of frustration when trying to don boxers. Pt educated on use of sock aid, pt modeled 2x, pt able to understand how to use sock aid, needed little to no assistance with placing sock on sock aid. Pts communication noted to be better now that pt is more awake and alert.     Patient left up in chair with all lines intact, call button in reach, chair alarm on and mom present    GOALS:   Multidisciplinary Problems       Occupational Therapy Goals          Problem: Occupational Therapy    Goal Priority Disciplines Outcome Interventions   Occupational Therapy Goal     OT, PT/OT  Ongoing, Progressing    Description: Goals to be met by: 7/12/22    Patient will increase functional independence with ADLs by performing:    Feeding with [place].  UE Dressing with [place].  LE Dressing with [place].  Grooming while standing at sink with [place].  Toileting from toilet with [place] for hygiene and clothing management.   Toilet transfer to toilet with [place].                         Time Tracking:     OT Date of Treatment: 06/30/22  OT Start Time: 0903  OT Stop Time: 0936  OT Total Time (min): 33 min    Billable Minutes:Self Care/Home Management ADL    OT/DAVIDE: OT     DAVIDE Visit Number: 1    6/30/2022  Direct supervision, note signed and edited by OT Gisele Mcdermott

## 2022-06-30 NOTE — PROGRESS NOTES
[facility]  Hospital Medicine Progress Note        Chief Complaint: Inpatient Follow-up for  hip fracture    HPI:   36 year old male with pmhx of childhood brain tumor s/p excision with chornic developmental delay, anxiety, and hypothyroidism presents to the ED after a fall at home.  Mother at bedside providing history as patient unable to participate in conversation.  Mother reports multiple falls lately and she's having greater difficulty caring for him.  States he tripped and fell last night and she was unable to get him off the floor.  EMS was called and he was transported ot the hospital.  XRs revealed L intertrochanteric femur fracture.  Vitals and labs stable and at baseline.  He does not take any blood thinners.  Compliant with home med regimen.  Orthopedics was consulted and the patient was admitted to the hospitalist group     Interval Hx:  Patient doing okay this morning.  He is a little nervous causes mother is leaving to go back to work.  He has been tachycardic overnight.  Repeat his EKG this morning that confirmed sinus tachycardia.  Will start him with some p.r.n. beta-blocker.  He is also will anemic but improved after getting transfused 2 units of blood yesterday.  Also reporting some pain.  Otherwise without complaint    Objective/physical exam:  General: In no acute distress, afebrile  Chest: Clear to auscultation bilaterally  Heart: RRR, +S1, S2, no appreciable murmur  Abdomen: Soft, nontender, BS +  MSK: Warm, no lower extremity edema, no clubbing or cyanosis  Neurologic: Developmental delay, cranial nerve II-XII intact, Strength 5/5 in all 4 extremities    VITAL SIGNS: 24 HRS MIN & MAX LAST   Temp  Min: 97.8 °F (36.6 °C)  Max: 98.5 °F (36.9 °C) 97.8 °F (36.6 °C)   BP  Min: 104/68  Max: 133/85 133/85   Pulse  Min: 115  Max: 133  (!) 132   Resp  Min: 16  Max: 20 19   SpO2  Min: 95 %  Max: 99 % 95 %       Recent Labs   Lab 06/28/22  0415 06/29/22  0410 06/30/22  0428    WBC 9.7 12.6* 11.5   RBC 4.02* 2.34* 3.22*   HGB 10.4* 6.2* 8.8*   HCT 33.5* 19.2* 27.2*   MCV 83.3 82.1 84.5   MCH 25.9* 26.5* 27.3   MCHC 31.0* 32.3* 32.4*   RDW 14.4 14.5 15.9    259 300   MPV 9.5 9.9 9.7       Recent Labs   Lab 06/28/22  0415 06/29/22  0410 06/30/22  0427    135* 138   K 5.1 4.3 4.1   CO2 26 24 29   BUN 11.1 13.5 12.6   CREATININE 0.77 0.80 0.73   CALCIUM 9.3 8.3* 8.5   MG 2.30  --   --    ALBUMIN 3.5 3.2* 3.2*   ALKPHOS 146 90 97   ALT 63* 35 32   AST 41* 29 33   BILITOT 0.8 0.4 0.8          Microbiology Results (last 7 days)     ** No results found for the last 168 hours. **           See below for Radiology    Scheduled Med:   docusate sodium  100 mg Oral Daily    enoxaparin  40 mg Subcutaneous Daily    hydrOXYzine HCL  25 mg Oral Daily    levothyroxine  50 mcg Oral Daily    methocarbamoL  500 mg Oral TID    QUEtiapine  200 mg Oral QHS        Continuous Infusions:       PRN Meds:  sodium chloride, acetaminophen, acetaminophen, ALPRAZolam, aluminum-magnesium hydroxide-simethicone, bisacodyL, bisacodyL, ceFAZolin 2 g/50mL Dextrose IVPB, diphenhydrAMINE, famotidine, HYDROmorphone, magnesium hydroxide 400 mg/5 ml, metoprolol, morphine, morphine, ondansetron, ondansetron, ondansetron, ondansetron, oxyCODONE-acetaminophen, oxyCODONE-acetaminophen, polyethylene glycol, prochlorperazine, senna, senna-docusate 8.6-50 mg, sodium chloride 0.9%, trazodone       Assessment/Plan:   L intertrochanteric femur fracture, displaced  Fall at home  Essential Hypertension  Hypothyroidism  H/o developmental delay     Improvement in patient's anemia after transfusion of 2 units of packed red blood cells yesterday.  Leukocytosis has resolved.  Continue with p.r.n. analgesia.  Added p.r.n. beta-blocker for heart rate greater than 110.  Will also get him some p.r.n. benzodiazepine for anxiety.  Continue PT and OT.  Case management working on placement.  Otherwise chronic medical issues are  stable     Patient condition:  Stable    Anticipated discharge and Disposition: TBD      All diagnosis and differential diagnosis have been reviewed; assessment and plan has been documented; I have personally reviewed the labs and test results that are presently available; I have reviewed the patients medication list; I have reviewed the consulting providers response and recommendations. I have reviewed or attempted to review medical records based upon their availability    All of the patient's questions have been  addressed and answered. Patient's is agreeable to the above stated plan. I will continue to monitor closely and make adjustments to medical management as needed.  _____________________________________________________________________      Radiology:  Fl Modified Barium Swallow Speech  See Speech Therapist Notes    This procedure was auto-finalized.      Clemente Patel MD   06/30/2022

## 2022-06-30 NOTE — PROGRESS NOTES
[facility]  Orthopedics  Progress Note    Patient Name: Nilo Claros Jr.  MRN: [id number]  Admission Date: 6/28/2022  Hospital Length of Stay: 2 days  Attending Provider: Clemente Patel MD  Primary Care Provider: Primary Doctor No  Follow-up For: Procedure(s) (LRB):  INSERTION, INTRAMEDULLARY AMIRAH, FEMUR (Left)    Post-Operative Day: 2 Day Post-Op  Subjective:     Principal Problem:Closed comminuted intertrochanteric fracture of left femur    Principal Orthopedic Problem: 2 Day Post-Op IMN left IT femur fx    Interval History: Patient is doing well this morning. States that pain has been well controlled. H&H improved following transfusion. States that he is feeling better. Complains of some itching this morning. Overall doing well. Mom is returning home for work today. Plan for rehab placement near their home in [place].    Review of patient's allergies indicates:   Allergen Reactions    Toradol [ketorolac]        Current Facility-Administered Medications   Medication    0.9%  NaCl infusion (for blood administration)    acetaminophen tablet 650 mg    acetaminophen tablet 650 mg    ALPRAZolam tablet 0.5 mg    aluminum-magnesium hydroxide-simethicone 200-200-20 mg/5 mL suspension 30 mL    bisacodyL suppository 10 mg    bisacodyL suppository 10 mg    cefazolin (ANCEF) 2 gram in dextrose 5% 50 mL IVPB (premix)    diphenhydrAMINE capsule 25 mg    docusate sodium capsule 100 mg    enoxaparin injection 40 mg    famotidine tablet 20 mg    HYDROmorphone (PF) injection 0.5 mg    hydrOXYzine HCL tablet 25 mg    levothyroxine tablet 50 mcg    magnesium hydroxide 400 mg/5 ml suspension 2,400 mg    methocarbamoL tablet 500 mg    metoprolol injection 5 mg    morphine injection 2 mg    morphine injection 2 mg    ondansetron disintegrating tablet 4 mg    ondansetron injection 4 mg    ondansetron injection 4 mg    ondansetron injection 4 mg     oxyCODONE-acetaminophen  mg per tablet 1 tablet    oxyCODONE-acetaminophen 5-325 mg per tablet 1 tablet    polyethylene glycol packet 17 g    prochlorperazine injection Soln 5 mg    QUEtiapine tablet 200 mg    senna tablet 8.6 mg    senna-docusate 8.6-50 mg per tablet 1 tablet    sodium chloride 0.9% flush 10 mL    trazodone split tablet 25 mg     Objective:     Vital Signs (Most Recent):  Temp: 97.8 °F (36.6 °C) (06/30/22 0733)  Pulse: (!) 132 (06/30/22 0733)  Resp: 19 (06/30/22 0733)  BP: 133/85 (06/30/22 0733)  SpO2: 95 % (06/30/22 0733) Vital Signs (24h Range):  Temp:  [97.8 °F (36.6 °C)-98.5 °F (36.9 °C)] 97.8 °F (36.6 °C)  Pulse:  [115-133] 132  Resp:  [16-20] 19  SpO2:  [95 %-99 %] 95 %  BP: (105-133)/(58-85) 133/85           There is no height or weight on file to calculate BMI.      Intake/Output Summary (Last 24 hours) at 6/30/2022 0758  Last data filed at 6/30/2022 0600  Gross per 24 hour   Intake 721.25 ml   Output 200 ml   Net 521.25 ml       Physical Exam:   Musculoskeletal:     Left lower extremity: Dressing is clean and dry with minimal drainage this morning, changed this morning; compartments are soft and compressible; Tolerates passive range of motion at the ankle, knee, and hip; appropriate tenderness to palpation; dorsi/plantar flexes the foot; SILT distally; BCR distally; DP pulse palpable      Diagnostic Findings:   Significant Labs:   Recent Lab Results  (Last 5 results in the past 72 hours)      06/30/22 0427 06/30/22 0426 06/29/22 1958 06/29/22 0410 06/28/22  2337        Unit Blood Type Code               Unit Expiration               Albumin/Globulin Ratio 1.2       1.3         Albumin 3.2       3.2         Alkaline Phosphatase 97       90         ALT 32       35         AST 33       29         Baso #   0.06     0.02         Basophil %   0.5     0.2         BILIRUBIN TOTAL 0.8       0.4         BUN 12.6       13.5         Calcium 8.5       8.3         Chloride  105       105         CO2 29       24         Creatinine 0.73       0.80         CROSSMATCH INTERPRETATION               DISPENSE STATUS               eGFR if non African American >60       >60         Eos #   0.24     0.01         Eosinophil %   2.1     0.1         Ferritin 83.96               Folate 3.8               Globulin, Total 2.7       2.4         Glucose 101       135         Group & Rh               Hematocrit   27.2     19.2         Hemoglobin   8.8     6.2         Immature Grans (Abs)   0.12     0.10         Immature Granulocytes   1.0     0.8         Indirect Sonali GEL               Iron 74               Lymph #   1.89     0.91         LYMPH %   16.4     7.2         MCH   27.3     26.5         MCHC   32.4     32.3         MCV   84.5     82.1         Mono #   0.70     0.59         Mono %   6.1     4.7         MPV   9.7     9.9         Neut #   8.5     11.0         Neut %   73.9     87.0         nRBC   0.0     0.0         Platelets   300     259         POCT Glucose     105     297       Potassium 4.1       4.3         Product Code               PROTEIN TOTAL 5.9       5.6         RBC   3.22     2.34         RDW   15.9     14.5         Sodium 138       135         Unit ABO/Rh               UNIT NUMBER               Vitamin B-12 704               WBC   11.5     12.6                               Significant Imaging: I have reviewed and interpreted all pertinent imaging results/findings.     Assessment/Plan:     Active Diagnoses:    Diagnosis Date Noted POA    PRINCIPAL PROBLEM:  Closed comminuted intertrochanteric fracture of left femur [S72.142A] 06/28/2022 Yes      Problems Resolved During this Admission:   Continue to monitor for improvements in H&H post operatively.   WBAT and ROMAT. Continue to work with therapy on mobilizing.   Will likely benefit from rehab placement for a short period.   Continue multimodal pain control as needed. Daily dry dressing changes as needed beginning tomorrow. Benadryl  ordered for itching.   Follow up with Dr. Melgar in approx 3 weeks for repeat x-rays and evaluation. No further orthopedic surgery planned at this time. Please call with any questions or concerns.    The above findings, diagnostics, and treatment plan were discussed with Dr. Melgar who is in agreement with the plan of care except as stated in additional documentation.      Lois Clements PA-C  Orthopedic Trauma Surgery  [facility]

## 2022-06-30 NOTE — PT/OT/SLP PROGRESS
Physical Therapy Treatment    Patient Name:  Nilo Claros Jr.   MRN:  [id number]    Recommendations:     Discharge Recommendations:  nursing facility, skilled, rehabilitation facility   Discharge Equipment Recommendations: walker, rolling   Barriers to discharge: Increased need of assistance    Assessment:     Nilo Claros Jr. is a 36 y.o. male admitted with a medical diagnosis of Closed comminuted intertrochanteric fracture of left femur.  He presents with the following impairments/functional limitations:  weakness, impaired endurance, impaired self care skills, impaired functional mobilty, gait instability, impaired balance, decreased lower extremity function, decreased coordination, decreased safety awareness. Progressing with PT. Still recommending SNF placement as mom is having difficulty caring for patient.     Rehab Prognosis: Good; patient would benefit from acute skilled PT services to address these deficits and reach maximum level of function.    Recent Surgery: Procedure(s) (LRB):  INSERTION, INTRAMEDULLARY AMIRAH, FEMUR (Left) 2 Days Post-Op    Plan:     During this hospitalization, patient to be seen BID to address the identified rehab impairments via gait training, therapeutic exercises, therapeutic activities, neuromuscular re-education and progress toward the following goals:    · Plan of Care Expires:  07/28/22    Subjective     Chief Complaint: pain  Patient/Family Comments/goals: none  Pain/Comfort:  · Pain Rating 1: 7/10  · Location - Side 1: Left  · Location 1: hip  · Pain Addressed 1: Distraction, Reposition  · Pain Rating Post-Intervention 1: 7/10      Objective:     Communicated with nurse prior to session.  Patient found HOB elevated with telemetry upon PT entry to room.     General Precautions: Standard, aspiration   Orthopedic Precautions:LLE weight bearing as tolerated   Braces: N/A  Respiratory Status: Room air     Functional Mobility:  · Bed Mobility:     · Supine to Sit:  minimum assistance  · Transfers:     · Sit to Stand:  contact guard assistance with rolling walker  · Gait: 48 ft with RW & CGA  · Balance: fair/poor      AM-PAC 6 CLICK MOBILITY  Turning over in bed (including adjusting bedclothes, sheets and blankets)?: 4  Sitting down on and standing up from a chair with arms (e.g., wheelchair, bedside commode, etc.): 3  Moving from lying on back to sitting on the side of the bed?: 3  Moving to and from a bed to a chair (including a wheelchair)?: 3  Need to walk in hospital room?: 3  Climbing 3-5 steps with a railing?: 1  Basic Mobility Total Score: 17       Therapeutic Activities and Exercises:   Therex  Patient performed seated Marches, Heel Raises, LAQ, Glute Sets, Resisted Hip ABD/ADD. All performed 2 x 10 reps. Assistance with LLE    Theract:   1)BM: Required MIN A. Increased time to complete task.   2)Gait: 48 ft with RW & CGA. Slowed antaglic gait pattern. Standing rest breaks.       Patient left HOB elevated with all lines intact and call button in reach..    GOALS:   Multidisciplinary Problems     Physical Therapy Goals        Problem: Physical Therapy    Goal Priority Disciplines Outcome Goal Variances Interventions   Physical Therapy Goal     PT, PT/OT Ongoing, Progressing     Description: Goals to be met by: 22     Patient will increase functional independence with mobility by performin. Supine to sit with MInimal Assistance  2. Sit to supine with MInimal Assistance  3. Sit to stand transfer with Minimal Assistance  4. Bed to chair transfer with Minimal Assistance using Rolling Walker  5. Gait  x 50 feet with Minimal Assistance using Rolling Walker.                      Time Tracking:     PT Received On: 22  PT Start Time: 1418     PT Stop Time: 1450  PT Total Time (min): 32 min     Billable Minutes: Therapeutic Activity 20 minutes and Therapeutic Exercise 12 minutes    Treatment Type: Treatment  PT/PTA: PT     PTA Visit Number: 3     2022

## 2022-06-30 NOTE — PT/OT/SLP PROGRESS
"Speech Language Pathology Department  Dysphagia Therapy    Patient Name:  Nilo Claros Jr.   MRN:  [id number]  Admitting Diagnosis: Closed comminuted intertrochanteric fracture of left femur    Recommendations:     General Recommendations:  Dysphagia therapy  Diet recommendations:  Minced & Moist Diet - IDDSI Level 5, Liquid Diet Level: Mildly thick liquids - IDDSI Level 2   Aspiration Precautions: small bites/sips, alternate solids/liquids and medications crushed in puree    Discharge recommendations:  nursing facility, basic, rehabilitation facility   Barriers to Discharge:  Safety Awareness      Subjective     Patient awake.    Patient goals: "What causes this?"     Pain/Comfort:  · Pain Rating 1: 7/10  · Pain Addressed 1: Nurse notified    Respiratory Status: room air    Objective:     Oral Musculature Evaluation:   Oral Musculature: WFL   Dentition: gums in poor condition   Voice Prior to PO Intake: adequate      Pt tolerated thermal stimulation to the anterior faucial pillars x20 with 75% swallow responses.  Laryngeal excursion fair.  Delay 3-7 seconds.    Difficulty tolerating treatment secondary to cognitive impairments. Pt perseverating on receiving pain medications.    Mother reports pt coughed on eggs this morning during breakfast.    Assessment:     Pt's mother reports coughing episode on eggs this AM otherwise tolerating additional solids and liquids.  SLP rec: downgrade solids to minced and moist solids, continue thin liquids, crush meds in pudding, small bites/sips, alternate solids/liquids.  Mother verbalized understanding. The pt continues to present with oropharyngeal dysphagia requiring a modified diet to reduce the risk of aspiration.    Goals:   Multidisciplinary Problems     SLP Goals        Problem: SLP    Goal Priority Disciplines Outcome   SLP Goal     SLP Ongoing, Progressing   Description: LTG: Pt will tolerate least restrictive PO diet with no clinical signs/sx " aspiration    STGs:  Pt will tolerate easy to chew solids with improved bolus formation/transport  Pt will complete laryngeal strengthening exercises and ryan maneuver with minimal-moderate cues.  Pt will tolerate thermal stimulation to the anterior faucial pillars with 100% effortful swallows and delay less than 2 seconds.                    Plan:     Will continue to follow and tx as appropriate.    Patient to be seen:  5 x/week   Plan of Care expires:  07/27/22  Plan of Care reviewed with:  patient, mother   SLP Follow-Up:  Yes       Time Tracking:     SLP Treatment Date:   06/30/22  Speech Start Time:  1130  Speech Stop Time:  1140     Speech Total Time (min):  10 min    Billable minutes:  Treatment of Swallow Dysfunction, 10 minutes       06/30/2022

## 2022-06-30 NOTE — ANESTHESIA POSTPROCEDURE EVALUATION
Anesthesia Post Evaluation    Patient: Nilo Claros Jr.    Procedure(s) Performed: Procedure(s) (LRB):  INSERTION, INTRAMEDULLARY AMIRAH, FEMUR (Left)    Final Anesthesia Type: general      Patient location during evaluation: PACU  Patient participation: Yes- Able to Participate  Level of consciousness: awake and alert  Post-procedure vital signs: reviewed and stable  Pain management: adequate  Airway patency: patent      Anesthetic complications: no      Cardiovascular status: blood pressure returned to baseline  Respiratory status: unassisted  Hydration status: euvolemic  Follow-up not needed.          Vitals Value Taken Time   /83 06/30/22 1529   Temp 37.2 °C (99 °F) 06/30/22 1529   Pulse 123 06/30/22 1529   Resp 18 06/30/22 1554   SpO2 96 % 06/30/22 1529         Event Time   Out of Recovery 09:30:00         Pain/Lm Score: Pain Rating Prior to Med Admin: 6 (6/30/2022  3:54 PM)  Pain Rating Post Med Admin: 0 (6/30/2022  5:01 AM)

## 2022-06-30 NOTE — PT/OT/SLP PROGRESS
Physical Therapy         Treatment        Nilo Claros Jr.   MRN: [id number]     PT Received On: 06/30/22  PT Start Time: 1106     PT Stop Time: 1124    PT Total Time (min): 18 min       Billable Minutes:  Gait [id number]  Total Minutes: 18    Treatment Type: Treatment  PT/PTA: PTA     PTA Visit Number: 2       General Precautions: Standard, fall  Orthopedic Precautions: Orthopedic Precautions : LLE weight bearing as tolerated   Braces:      Spiritual, Cultural Beliefs, Christian Practices, Values that Affect Care: no    Subjective:  Communicated with nurse prior to session.         Objective:  Patient found sitting up in chair, with      Balance:   Static Stand: FAIR: Maintains without assist but unable to take challenges  Dynamic stand: POOR+: Needs MIN (minimal ) assist during gait    Transfer Training:  Sit to stand:Minimal Assistance with Rolling Walker x2 trials    Gait Training:  Patient gait trained   45  Feet and 20 feet on level tile with Rolling Walker with Archie.  Pt with demonstarting a  step-to gait with decreased kolton, decreased step length and decreased weight-shifting ability.Impairments contributing to gait deviations include pain and decreased strength    Activity Tolerance:  Patient tolerated treatment well    Patient left up in chair with all lines intact, call button in reach and chair alarm on.    Assessment:  Nilo Claros Jr. is a 36 y.o. male with a medical diagnosis of Closed comminuted intertrochanteric fracture of left femur. He presents with increased gait distance and decreased assistance during transfers.    Rehab potential is good.    Activity tolerance: Good    Discharge recommendations: Discharge Facility/Level of Care Needs: rehabilitation facility     Equipment recommendations: Equipment Needed After Discharge: walker, rolling     GOALS:   Multidisciplinary Problems     Physical Therapy Goals        Problem: Physical Therapy    Goal Priority Disciplines Outcome Goal  Variances Interventions   Physical Therapy Goal     PT, PT/OT Ongoing, Progressing     Description: Goals to be met by: 22     Patient will increase functional independence with mobility by performin. Supine to sit with MInimal Assistance  2. Sit to supine with MInimal Assistance  3. Sit to stand transfer with Minimal Assistance  4. Bed to chair transfer with Minimal Assistance using Rolling Walker  5. Gait  x 50 feet with Minimal Assistance using Rolling Walker.                      PLAN:    Patient to be seen BID  to address the above listed problems via gait training, therapeutic activities  Plan of Care expires: 22  Plan of Care reviewed with: patient, mother         2022

## 2022-07-01 LAB
ALBUMIN SERPL-MCNC: 3.2 GM/DL (ref 3.5–5)
ALBUMIN/GLOB SERPL: 1.1 RATIO (ref 1.1–2)
ALP SERPL-CCNC: 108 UNIT/L (ref 40–150)
ALT SERPL-CCNC: 31 UNIT/L (ref 0–55)
AST SERPL-CCNC: 33 UNIT/L (ref 5–34)
BASOPHILS # BLD AUTO: 0.09 X10(3)/MCL (ref 0–0.2)
BASOPHILS NFR BLD AUTO: 1.3 %
BILIRUBIN DIRECT+TOT PNL SERPL-MCNC: 1 MG/DL
BUN SERPL-MCNC: 9.1 MG/DL (ref 8.9–20.6)
CALCIUM SERPL-MCNC: 8.7 MG/DL (ref 8.4–10.2)
CHLORIDE SERPL-SCNC: 102 MMOL/L (ref 98–107)
CO2 SERPL-SCNC: 30 MMOL/L (ref 22–29)
CREAT SERPL-MCNC: 0.68 MG/DL (ref 0.73–1.18)
EOSINOPHIL # BLD AUTO: 0.39 X10(3)/MCL (ref 0–0.9)
EOSINOPHIL NFR BLD AUTO: 5.5 %
ERYTHROCYTE [DISTWIDTH] IN BLOOD BY AUTOMATED COUNT: 15.8 % (ref 11.5–17)
GLOBULIN SER-MCNC: 3 GM/DL (ref 2.4–3.5)
GLUCOSE SERPL-MCNC: 101 MG/DL (ref 74–100)
HCT VFR BLD AUTO: 25.2 % (ref 42–52)
HGB BLD-MCNC: 8.5 GM/DL (ref 14–18)
IMM GRANULOCYTES # BLD AUTO: 0.09 X10(3)/MCL (ref 0–0.04)
IMM GRANULOCYTES NFR BLD AUTO: 1.3 %
LYMPHOCYTES # BLD AUTO: 1.37 X10(3)/MCL (ref 0.6–4.6)
LYMPHOCYTES NFR BLD AUTO: 19.4 %
MCH RBC QN AUTO: 27.4 PG (ref 27–31)
MCHC RBC AUTO-ENTMCNC: 33.7 MG/DL (ref 33–36)
MCV RBC AUTO: 81.3 FL (ref 80–94)
MONOCYTES # BLD AUTO: 0.44 X10(3)/MCL (ref 0.1–1.3)
MONOCYTES NFR BLD AUTO: 6.2 %
NEUTROPHILS # BLD AUTO: 4.7 X10(3)/MCL (ref 2.1–9.2)
NEUTROPHILS NFR BLD AUTO: 66.3 %
NRBC BLD AUTO-RTO: 0 %
PLATELET # BLD AUTO: 268 X10(3)/MCL (ref 130–400)
PMV BLD AUTO: 9.5 FL (ref 7.4–10.4)
POCT GLUCOSE: 89 MG/DL (ref 70–110)
POTASSIUM SERPL-SCNC: 3.9 MMOL/L (ref 3.5–5.1)
PROT SERPL-MCNC: 6.2 GM/DL (ref 6.4–8.3)
RBC # BLD AUTO: 3.1 X10(6)/MCL (ref 4.7–6.1)
SODIUM SERPL-SCNC: 137 MMOL/L (ref 136–145)
WBC # SPEC AUTO: 7.1 X10(3)/MCL (ref 4.5–11.5)

## 2022-07-01 PROCEDURE — 25000003 PHARM REV CODE 250: Performed by: PHYSICIAN ASSISTANT

## 2022-07-01 PROCEDURE — 36415 COLL VENOUS BLD VENIPUNCTURE: CPT | Performed by: PHYSICIAN ASSISTANT

## 2022-07-01 PROCEDURE — 97535 SELF CARE MNGMENT TRAINING: CPT

## 2022-07-01 PROCEDURE — 97116 GAIT TRAINING THERAPY: CPT | Mod: CQ

## 2022-07-01 PROCEDURE — 97530 THERAPEUTIC ACTIVITIES: CPT

## 2022-07-01 PROCEDURE — 97530 THERAPEUTIC ACTIVITIES: CPT | Mod: CQ

## 2022-07-01 PROCEDURE — 80053 COMPREHEN METABOLIC PANEL: CPT | Performed by: PHYSICIAN ASSISTANT

## 2022-07-01 PROCEDURE — 25000003 PHARM REV CODE 250: Performed by: HOSPITALIST

## 2022-07-01 PROCEDURE — 85025 COMPLETE CBC W/AUTO DIFF WBC: CPT | Performed by: PHYSICIAN ASSISTANT

## 2022-07-01 PROCEDURE — 11000001 HC ACUTE MED/SURG PRIVATE ROOM

## 2022-07-01 PROCEDURE — 63600175 PHARM REV CODE 636 W HCPCS: Performed by: PHYSICIAN ASSISTANT

## 2022-07-01 RX ORDER — CARVEDILOL 3.12 MG/1
3.12 TABLET ORAL 2 TIMES DAILY
Status: DISCONTINUED | OUTPATIENT
Start: 2022-07-01 | End: 2022-07-17 | Stop reason: HOSPADM

## 2022-07-01 RX ADMIN — OXYCODONE AND ACETAMINOPHEN 1 TABLET: 10; 325 TABLET ORAL at 05:07

## 2022-07-01 RX ADMIN — LEVOTHYROXINE SODIUM 50 MCG: 25 TABLET ORAL at 09:07

## 2022-07-01 RX ADMIN — DIPHENHYDRAMINE HYDROCHLORIDE 25 MG: 25 CAPSULE ORAL at 05:07

## 2022-07-01 RX ADMIN — METHOCARBAMOL 500 MG: 500 TABLET ORAL at 08:07

## 2022-07-01 RX ADMIN — HYDROXYZINE HYDROCHLORIDE 25 MG: 25 TABLET ORAL at 09:07

## 2022-07-01 RX ADMIN — ENOXAPARIN SODIUM 40 MG: 100 INJECTION SUBCUTANEOUS at 04:07

## 2022-07-01 RX ADMIN — OXYCODONE AND ACETAMINOPHEN 1 TABLET: 10; 325 TABLET ORAL at 09:07

## 2022-07-01 RX ADMIN — DIPHENHYDRAMINE HYDROCHLORIDE 25 MG: 25 CAPSULE ORAL at 02:07

## 2022-07-01 RX ADMIN — DOCUSATE SODIUM 100 MG: 100 CAPSULE, LIQUID FILLED ORAL at 09:07

## 2022-07-01 RX ADMIN — OXYCODONE AND ACETAMINOPHEN 1 TABLET: 10; 325 TABLET ORAL at 02:07

## 2022-07-01 RX ADMIN — METHOCARBAMOL 500 MG: 500 TABLET ORAL at 09:07

## 2022-07-01 RX ADMIN — CARVEDILOL 3.12 MG: 3.12 TABLET, FILM COATED ORAL at 08:07

## 2022-07-01 RX ADMIN — METHOCARBAMOL 500 MG: 500 TABLET ORAL at 04:07

## 2022-07-01 RX ADMIN — CARVEDILOL 3.12 MG: 3.12 TABLET, FILM COATED ORAL at 10:07

## 2022-07-01 RX ADMIN — QUETIAPINE 200 MG: 100 TABLET ORAL at 08:07

## 2022-07-01 NOTE — PT/OT/SLP PROGRESS
Physical Therapy         Treatment        Nilo Claros Jr.   MRN: [id number]     PT Received On: 07/01/22  PT Start Time: 0900     PT Stop Time: 0923    PT Total Time (min): 23 min       Billable Minutes:  Gait [id number] and Therapeutic Activity 10  Total Minutes: 23    Treatment Type: Treatment  PT/PTA: PTA     PTA Visit Number: 4       General Precautions: Standard, aspiration  Orthopedic Precautions: Orthopedic Precautions : LLE weight bearing as tolerated   Braces:      Spiritual, Cultural Beliefs, Oriental orthodox Practices, Values that Affect Care: no    Subjective:  Communicated with nurse prior to session.         Objective:  Patient found supine in bed, with Patient found with: telemetry    Functional Mobility:  Bed Mobility:   Supine to sit: Minimal Assistance   Sit to supine: Minimal Assistance   Rolling: Minimal Assistance   Scooting: Minimal Assistance    Balance:   Static Sit: FAIR: Maintains without assist, but unable to take any challenges   Dynamic Sit:  FAIR: Cannot move trunk without losing balance  Static Stand: FAIR: Maintains without assist but unable to take challenges  Dynamic stand: POOR+: Needs MIN (minimal ) assist during gait    Transfer Training:  Sit to stand:Minimal Assistance with Rolling Walker x2 trials    Gait Training:  Patient gait trained   35  Feet and 27 feet on level tile with Rolling Walker with Minimal Assistance.  Pt with demonstarting a  3-point gait with decreased kolton, decreased step length and decreased weight-shifting ability.Impairments contributing to gait deviations include pain and decreased strength.   Pt with elevated HR during activity (160bpm).     Activity Tolerance:  Patient tolerated treatment well    Patient left up in chair with all lines intact, call button in reach and chair alarm on.    Assessment:  Nilo Claros Jr. is a 36 y.o. male with a medical diagnosis of Closed comminuted intertrochanteric fracture of left femur. He presents with  decreased weight bearing through LLE during gait.    Rehab potential is excellent.    Activity tolerance: Excellent    Discharge recommendations: Discharge Facility/Level of Care Needs: nursing facility, skilled, rehabilitation facility     Equipment recommendations: Equipment Needed After Discharge: walker, rolling     GOALS:   Multidisciplinary Problems     Physical Therapy Goals        Problem: Physical Therapy    Goal Priority Disciplines Outcome Goal Variances Interventions   Physical Therapy Goal     PT, PT/OT Ongoing, Progressing     Description: Goals to be met by: 22     Patient will increase functional independence with mobility by performin. Supine to sit with MInimal Assistance  2. Sit to supine with MInimal Assistance  3. Sit to stand transfer with Minimal Assistance  4. Bed to chair transfer with Minimal Assistance using Rolling Walker  5. Gait  x 50 feet with Minimal Assistance using Rolling Walker.                      PLAN:    Patient to be seen BID  to address the above listed problems via gait training, therapeutic activities  Plan of Care expires: 22  Plan of Care reviewed with: patient         2022

## 2022-07-01 NOTE — PROGRESS NOTES
[facility] - [facility] Tele[facility]  Orthopedics  Progress Note    Patient Name: Nilo Claros Jr.  MRN: [id number]  Admission Date: 6/28/2022  Hospital Length of Stay: 3 days  Attending Provider: Clemente Patel MD  Primary Care Provider: Primary Doctor No  Follow-up For: Procedure(s) (LRB):  INSERTION, INTRAMEDULLARY AMIRAH, FEMUR (Left)    Post-Operative Day: 3 Day Post-Op  Subjective:     Principal Problem:Closed comminuted intertrochanteric fracture of left femur    Principal Orthopedic Problem: 3 Day Post-Op IMN left IT femur fx    Interval History: Patient is doing well this morning. States that pain has been well controlled. States itching has improved. Was able to rest well overnight. Overall doing well. Mom not present this morning.    Review of patient's allergies indicates:   Allergen Reactions    Toradol [ketorolac]        Current Facility-Administered Medications   Medication    0.9%  NaCl infusion (for blood administration)    acetaminophen tablet 650 mg    acetaminophen tablet 650 mg    ALPRAZolam tablet 0.5 mg    aluminum-magnesium hydroxide-simethicone 200-200-20 mg/5 mL suspension 30 mL    bisacodyL suppository 10 mg    bisacodyL suppository 10 mg    cefazolin (ANCEF) 2 gram in dextrose 5% 50 mL IVPB (premix)    diphenhydrAMINE capsule 25 mg    docusate sodium capsule 100 mg    enoxaparin injection 40 mg    famotidine tablet 20 mg    HYDROmorphone (PF) injection 0.5 mg    hydrOXYzine HCL tablet 25 mg    levothyroxine tablet 50 mcg    magnesium hydroxide 400 mg/5 ml suspension 2,400 mg    methocarbamoL tablet 500 mg    metoprolol injection 5 mg    morphine injection 2 mg    morphine injection 2 mg    ondansetron disintegrating tablet 4 mg    ondansetron injection 4 mg    ondansetron injection 4 mg    ondansetron injection 4 mg    oxyCODONE-acetaminophen  mg per tablet 1 tablet    oxyCODONE-acetaminophen 5-325 mg per tablet 1 tablet    polyethylene  glycol packet 17 g    prochlorperazine injection Soln 5 mg    QUEtiapine tablet 200 mg    senna tablet 8.6 mg    senna-docusate 8.6-50 mg per tablet 1 tablet    sodium chloride 0.9% flush 10 mL    trazodone split tablet 25 mg     Objective:     Vital Signs (Most Recent):  Temp: 97.8 °F (36.6 °C) (07/01/22 0331)  Pulse: (!) 117 (07/01/22 0331)  Resp: 18 (07/01/22 0524)  BP: 110/76 (07/01/22 0331)  SpO2: 96 % (07/01/22 0331) Vital Signs (24h Range):  Temp:  [97.8 °F (36.6 °C)-99 °F (37.2 °C)] 97.8 °F (36.6 °C)  Pulse:  [112-125] 117  Resp:  [17-18] 18  SpO2:  [94 %-96 %] 96 %  BP: (104-146)/(68-89) 110/76     Weight: 66.2 kg (145 lb 15.1 oz)     There is no height or weight on file to calculate BMI.      Intake/Output Summary (Last 24 hours) at 7/1/2022 0735  Last data filed at 7/1/2022 0600  Gross per 24 hour   Intake 1240 ml   Output 250 ml   Net 990 ml       Physical Exam:   Musculoskeletal:     Left lower extremity: Dressing is clean and dry with minimal drainage this morning, changed this morning; compartments are soft and compressible; Tolerates passive range of motion at the ankle, knee, and hip; appropriate tenderness to palpation; dorsi/plantar flexes the foot; SILT distally; BCR distally; DP pulse palpable      Diagnostic Findings:   Significant Labs:   Recent Lab Results  (Last 5 results in the past 72 hours)      07/01/22  0516   06/30/22  0427   06/30/22  0426   06/29/22 1958 06/29/22  0410        Unit Blood Type Code               Unit Expiration               Albumin/Globulin Ratio 1.1   1.2       1.3       Albumin 3.2   3.2       3.2       Alkaline Phosphatase 108   97       90       ALT 31   32       35       AST 33   33       29       Baso # 0.09     0.06     0.02       Basophil % 1.3     0.5     0.2       BILIRUBIN TOTAL 1.0   0.8       0.4       BUN 9.1   12.6       13.5       Calcium 8.7   8.5       8.3       Chloride 102   105       105       CO2 30   29       24       Creatinine 0.68    0.73       0.80       CROSSMATCH INTERPRETATION               DISPENSE STATUS               eGFR if non African American >60   >60       >60       Eos # 0.39     0.24     0.01       Eosinophil % 5.5     2.1     0.1       Ferritin   83.96             Folate   3.8             Globulin, Total 3.0   2.7       2.4       Glucose 101   101       135       Group & Rh               Hematocrit 25.2     27.2     19.2       Hemoglobin 8.5     8.8     6.2       Immature Grans (Abs) 0.09     0.12     0.10       Immature Granulocytes 1.3     1.0     0.8       Indirect Sonali GEL               Iron   74             Lymph # 1.37     1.89     0.91       LYMPH % 19.4     16.4     7.2       MCH 27.4     27.3     26.5       MCHC 33.7     32.4     32.3       MCV 81.3     84.5     82.1       Mono # 0.44     0.70     0.59       Mono % 6.2     6.1     4.7       MPV 9.5     9.7     9.9       Neut # 4.7     8.5     11.0       Neut % 66.3     73.9     87.0       nRBC 0.0     0.0     0.0       Platelets 268     300     259       POCT Glucose       105         Potassium 3.9   4.1       4.3       Product Code               PROTEIN TOTAL 6.2   5.9       5.6       RBC 3.10     3.22     2.34       RDW 15.8     15.9     14.5       Sodium 137   138       135       Unit ABO/Rh               UNIT NUMBER               Vitamin B-12   704             WBC 7.1     11.5     12.6                             Significant Imaging: I have reviewed and interpreted all pertinent imaging results/findings.     Assessment/Plan:     Active Diagnoses:    Diagnosis Date Noted POA    PRINCIPAL PROBLEM:  Closed comminuted intertrochanteric fracture of left femur [S72.142A] 06/28/2022 Yes      Problems Resolved During this Admission:   H&H remains stable this morning.   WBAT and ROMAT. Continue to work with therapy on mobilizing.   Will likely benefit from rehab placement for a short period.   Continue multimodal pain control as needed. Daily dry dressing changes as  needed  Follow up with Dr. Melgar in approx 3 weeks for repeat x-rays and evaluation. No further orthopedic surgery planned at this time. Please call with any questions or concerns. He is orthopaedically stable for transfer at this time.     The above findings, diagnostics, and treatment plan were discussed with Dr. Melgar who is in agreement with the plan of care except as stated in additional documentation.      Lois Clements PA-C  Orthopedic Trauma Surgery  [facility]

## 2022-07-01 NOTE — PT/OT/SLP PROGRESS
SLP following up regarding diet tolerance, nursing reports no difficulty.  SLP to continue dysphagia POC.

## 2022-07-01 NOTE — PT/OT/SLP PROGRESS
Occupational Therapy   Treatment    Name: Nilo Claros Jr.  MRN: [id number]  Admitting Diagnosis:  Closed comminuted intertrochanteric fracture of left femur  3 Days Post-Op    Recommendations:     Discharge Recommendations: rehabilitation facility  Discharge Equipment Recommendations:  bedside commode, walker, rolling, bath bench  Barriers to discharge:       Assessment:     Nilo Claros Jr. is a 36 y.o. male with a medical diagnosis of Closed comminuted intertrochanteric fracture of left femur.  He presents with decreased functional mobility, c/o pain in LLE, decreased balance. Performance deficits affecting function are  .     Rehab Prognosis:  Good; patient would benefit from acute skilled OT services to address these deficits and reach maximum level of function.       Plan:     Patient to be seen 5 x/week, daily to address the above listed problems via self-care/home management, therapeutic activities, therapeutic exercises  · Plan of Care Expires: 07/14/22  · Plan of Care Reviewed with: patient    Subjective     Pain/Comfort:  · Pain Rating 1: 6/10  · Location - Side 1: Left  · Location 1: leg    Objective:     Communicated with: RN prior to session.  Patient found up in chair with   upon OT entry to room.    General Precautions: Standard, fall   Orthopedic Precautions:LLE weight bearing as tolerated   Braces:    Respiratory Status: Room air     Occupational Performance:     Bed Mobility:    · Patient completed Scooting/Bridging with stand by assistance  · Patient completed Sit to Supine with minimum assistance     Functional Mobility/Transfers:  · Patient completed Sit <> Stand Transfer with contact guard assistance  with  rolling walker   · Patient completed Toilet Transfer Step Transfer technique with stand by assistance with  rolling walker and grab bars  · Functional Mobility: pt ambulated chair> bathroom toilet with RW SBA, vc's for proper step to gait versus hoping, ambulated toilet >EOB  with RW SBA    Activities of Daily Living:  · Lower Body Dressing: minimum assistance reviewed LE dressing with AE; pt req'd min A to doff sock with reacher on LLE, donned with min A with sock aide, pt showed frustration with AE, req'd encouragement to utilize, able to doff/saturnino R sock without difficulty      AMPAC 6 Click ADL:      Treatment & Education:  Education on LE dressing with AE, education on expectations in inpatient rehab    Patient left HOB elevated with call button in reachEducation:      GOALS:   Multidisciplinary Problems     Occupational Therapy Goals        Problem: Occupational Therapy    Goal Priority Disciplines Outcome Interventions   Occupational Therapy Goal     OT, PT/OT Ongoing, Progressing    Description: Goals to be met by: 7/12/22    Patient will increase functional independence with ADLs by performing:    Feeding with [place].  UE Dressing with [place].  LE Dressing with [place].  Grooming while standing at sink with [place].  Toileting from toilet with [place] for hygiene and clothing management.   Toilet transfer to toilet with [place].                     Time Tracking:     OT Date of Treatment: 07/01/22  OT Start Time: 1110  OT Stop Time: 1136  OT Total Time (min): 26 min    Billable Minutes:Self Care/Home Management 10  Therapeutic Activity 16    OT/DAVIDE: OT     DAVIDE Visit Number: 2    7/1/2022

## 2022-07-01 NOTE — PROGRESS NOTES
[facility]  [facility] Progress Note        Chief Complaint: Inpatient Follow-up for hip fracture     HPI:   36 year old male with pmhx of childhood brain tumor s/p excision with chornic developmental delay, anxiety, and hypothyroidism presents to the ED after a fall at home.  Mother at bedside providing history as patient unable to participate in conversation.  Mother reports multiple falls lately and she's having greater difficulty caring for him.  States he tripped and fell last night and she was unable to get him off the floor.  EMS was called and he was transported ot the hospital.  XRs revealed L intertrochanteric femur fracture.  Vitals and labs stable and at baseline.  He does not take any blood thinners.  Compliant with home med regimen.  Orthopedics was consulted and the patient was admitted to the hospitalist group     Interval Hx:  No family at bedside this mrjeana.  Patient doing ok.  No current complaints.  Pain controlled.  Tolerating PO    Objective/physical exam:  General: In no acute distress, afebrile  Chest: Clear to auscultation bilaterally  Heart: RRR, +S1, S2, no appreciable murmur  Abdomen: Soft, nontender, BS +  MSK: Warm, no lower extremity edema, no clubbing or cyanosis  Neurologic: Developmental delay, cranial nerve II-XII intact, Strength 5/5 in all 4 extremities       VITAL SIGNS: 24 HRS MIN & MAX LAST   Temp  Min: 97.8 °F (36.6 °C)  Max: 99 °F (37.2 °C) 98.1 °F (36.7 °C)   BP  Min: 104/68  Max: 146/83 115/75   Pulse  Min: 111  Max: 125  (!) 111   Resp  Min: 16  Max: 18 18   SpO2  Min: 94 %  Max: 97 % 97 %       Recent Labs   Lab 06/29/22  0410 06/30/22  0426 07/01/22  0516   WBC 12.6* 11.5 7.1   RBC 2.34* 3.22* 3.10*   HGB 6.2* 8.8* 8.5*   HCT 19.2* 27.2* 25.2*   MCV 82.1 84.5 81.3   MCH 26.5* 27.3 27.4   MCHC 32.3* 32.4* 33.7   RDW 14.5 15.9 15.8    300 268   MPV 9.9 9.7 9.5       Recent Labs   Lab 06/28/22  0415 06/29/22  0410  06/30/22  0427 07/01/22  0516    135* 138 137   K 5.1 4.3 4.1 3.9   CO2 26 24 29 30*   BUN 11.1 13.5 12.6 9.1   CREATININE 0.77 0.80 0.73 0.68*   CALCIUM 9.3 8.3* 8.5 8.7   MG 2.30  --   --   --    ALBUMIN 3.5 3.2* 3.2* 3.2*   ALKPHOS 146 90 97 108   ALT 63* 35 32 31   AST 41* 29 33 33   BILITOT 0.8 0.4 0.8 1.0          Microbiology Results (last 7 days)     ** No results found for the last 168 hours. **           See below for Radiology    Scheduled Med:   docusate sodium  100 mg Oral Daily    enoxaparin  40 mg Subcutaneous Daily    hydrOXYzine HCL  25 mg Oral Daily    levothyroxine  50 mcg Oral Daily    methocarbamoL  500 mg Oral TID    QUEtiapine  200 mg Oral QHS        Continuous Infusions:       PRN Meds:  sodium chloride, acetaminophen, acetaminophen, ALPRAZolam, aluminum-magnesium hydroxide-simethicone, bisacodyL, bisacodyL, ceFAZolin 2 g/50mL Dextrose IVPB, diphenhydrAMINE, famotidine, HYDROmorphone, magnesium hydroxide 400 mg/5 ml, metoprolol, morphine, morphine, ondansetron, ondansetron, ondansetron, ondansetron, oxyCODONE-acetaminophen, oxyCODONE-acetaminophen, polyethylene glycol, prochlorperazine, senna, senna-docusate 8.6-50 mg, sodium chloride 0.9%, trazodone       Assessment/Plan:   L intertrochanteric femur fracture, displaced  Fall at home  Essential Hypertension  Hypothyroidism  H/o developmental delay     Anemia stable. Continue to monitor  Iron levels within normal range.  Noted continued sinus tachy.  Will add some oral coreg this morning to see if we can get that better controlled.   Continue PT/OT  Case mgmt working on placement     Patient condition:  Stable    Anticipated discharge and Disposition: TBD      All diagnosis and differential diagnosis have been reviewed; assessment and plan has been documented; I have personally reviewed the labs and test results that are presently available; I have reviewed the patients medication list; I have reviewed the consulting providers  response and recommendations. I have reviewed or attempted to review medical records based upon their availability    All of the patient's questions have been  addressed and answered. Patient's is agreeable to the above stated plan. I will continue to monitor closely and make adjustments to medical management as needed.  _____________________________________________________________________      Radiology:  Fl Modified Barium Swallow Speech  See Speech Therapist Notes    This procedure was auto-finalized.      Clemente Patel MD   07/01/2022

## 2022-07-01 NOTE — PT/OT/SLP PROGRESS
Physical Therapy         Treatment        Nilo Claros Jr.   MRN: [id number]     PT Received On: 07/01/22  PT Start Time: 1331     PT Stop Time: 1348    PT Total Time (min): 17 min       Billable Minutes:  Gait [id number]  Total Minutes: 17    Treatment Type: Treatment  PT/PTA: PTA     PTA Visit Number: 4       General Precautions: Standard, aspiration  Orthopedic Precautions: Orthopedic Precautions : LLE weight bearing as tolerated   Braces:      Spiritual, Cultural Beliefs, Mormon Practices, Values that Affect Care: no    Objective:  Patient found in bed upon entry.     Functional Mobility:  Bed Mobility:   Supine to sit: Minimal Assistance   Sit to supine: Minimal Assistance   Rolling: Minimal Assistance   Scooting: Minimal Assistance    Balance:     Transitional Sit-to-stand: min A with RW    Gait Training:  Patient gait trained 62  feet on level tile with Rolling Walker with Minimal Assistance.  Pt presents with step to gait pattern and decreased stance time on LLE. Pt given VC's for increased step length and to keep RW on ground. Impairments contributing to gait deviations include impaired balance and decreased strength      Additional Treatment:  Therapeutic Exercises   LLE: heel slides, SAQ, hip abd/add 1 X 10 reps.     Activity Tolerance:  Patient tolerated treatment well    Patient left HOB elevated with call button in reach.    Assessment:  Nilo Claros Jr. is a 36 y.o. male with a medical diagnosis of Closed comminuted intertrochanteric fracture of left femur.     Rehab potential is excellent.    Activity tolerance: Excellent    Discharge recommendations:       Equipment recommendations:       GOALS:   Multidisciplinary Problems     Physical Therapy Goals        Problem: Physical Therapy    Goal Priority Disciplines Outcome Goal Variances Interventions   Physical Therapy Goal     PT, PT/OT Ongoing, Progressing     Description: Goals to be met by: 7/28/22     Patient will increase  functional independence with mobility by performin. Supine to sit with MInimal Assistance  2. Sit to supine with MInimal Assistance  3. Sit to stand transfer with Minimal Assistance  4. Bed to chair transfer with Minimal Assistance using Rolling Walker  5. Gait  x 50 feet with Minimal Assistance using Rolling Walker.                      PLAN:    Patient to be seen BID  to address the above listed problems via gait training, therapeutic activities, therapeutic exercises  Plan of Care expires: 22  Plan of Care reviewed with: patient         2022

## 2022-07-02 LAB
ANION GAP SERPL CALC-SCNC: 7 MEQ/L
BASOPHILS # BLD AUTO: 0.09 X10(3)/MCL (ref 0–0.2)
BASOPHILS NFR BLD AUTO: 1.4 %
BUN SERPL-MCNC: 8 MG/DL (ref 8.9–20.6)
CALCIUM SERPL-MCNC: 8.9 MG/DL (ref 8.4–10.2)
CHLORIDE SERPL-SCNC: 101 MMOL/L (ref 98–107)
CO2 SERPL-SCNC: 29 MMOL/L (ref 22–29)
CREAT SERPL-MCNC: 0.66 MG/DL (ref 0.73–1.18)
CREAT/UREA NIT SERPL: 12
EOSINOPHIL # BLD AUTO: 0.37 X10(3)/MCL (ref 0–0.9)
EOSINOPHIL NFR BLD AUTO: 5.7 %
ERYTHROCYTE [DISTWIDTH] IN BLOOD BY AUTOMATED COUNT: 15.6 % (ref 11.5–17)
GLUCOSE SERPL-MCNC: 95 MG/DL (ref 74–100)
HCT VFR BLD AUTO: 26.5 % (ref 42–52)
HGB BLD-MCNC: 8.9 GM/DL (ref 14–18)
IMM GRANULOCYTES # BLD AUTO: 0.09 X10(3)/MCL (ref 0–0.04)
IMM GRANULOCYTES NFR BLD AUTO: 1.4 %
LYMPHOCYTES # BLD AUTO: 1.3 X10(3)/MCL (ref 0.6–4.6)
LYMPHOCYTES NFR BLD AUTO: 19.9 %
MCH RBC QN AUTO: 27.4 PG (ref 27–31)
MCHC RBC AUTO-ENTMCNC: 33.6 MG/DL (ref 33–36)
MCV RBC AUTO: 81.5 FL (ref 80–94)
MONOCYTES # BLD AUTO: 0.44 X10(3)/MCL (ref 0.1–1.3)
MONOCYTES NFR BLD AUTO: 6.7 %
NEUTROPHILS # BLD AUTO: 4.2 X10(3)/MCL (ref 2.1–9.2)
NEUTROPHILS NFR BLD AUTO: 64.9 %
NRBC BLD AUTO-RTO: 0 %
PLATELET # BLD AUTO: 275 X10(3)/MCL (ref 130–400)
PMV BLD AUTO: 9.2 FL (ref 7.4–10.4)
POCT GLUCOSE: 153 MG/DL (ref 70–110)
POTASSIUM SERPL-SCNC: 4 MMOL/L (ref 3.5–5.1)
RBC # BLD AUTO: 3.25 X10(6)/MCL (ref 4.7–6.1)
SODIUM SERPL-SCNC: 137 MMOL/L (ref 136–145)
WBC # SPEC AUTO: 6.5 X10(3)/MCL (ref 4.5–11.5)

## 2022-07-02 PROCEDURE — 80048 BASIC METABOLIC PNL TOTAL CA: CPT | Performed by: HOSPITALIST

## 2022-07-02 PROCEDURE — 25000003 PHARM REV CODE 250: Performed by: NURSE PRACTITIONER

## 2022-07-02 PROCEDURE — 25000003 PHARM REV CODE 250: Performed by: PHYSICIAN ASSISTANT

## 2022-07-02 PROCEDURE — 85025 COMPLETE CBC W/AUTO DIFF WBC: CPT | Performed by: HOSPITALIST

## 2022-07-02 PROCEDURE — 63600175 PHARM REV CODE 636 W HCPCS: Performed by: PHYSICIAN ASSISTANT

## 2022-07-02 PROCEDURE — 97530 THERAPEUTIC ACTIVITIES: CPT | Mod: CO

## 2022-07-02 PROCEDURE — 11000001 HC ACUTE MED/SURG PRIVATE ROOM

## 2022-07-02 PROCEDURE — 97535 SELF CARE MNGMENT TRAINING: CPT | Mod: CO

## 2022-07-02 PROCEDURE — 25000003 PHARM REV CODE 250: Performed by: HOSPITALIST

## 2022-07-02 PROCEDURE — 36415 COLL VENOUS BLD VENIPUNCTURE: CPT | Performed by: HOSPITALIST

## 2022-07-02 RX ADMIN — HYDROXYZINE HYDROCHLORIDE 25 MG: 25 TABLET ORAL at 08:07

## 2022-07-02 RX ADMIN — ACETAMINOPHEN 650 MG: 325 TABLET, FILM COATED ORAL at 08:07

## 2022-07-02 RX ADMIN — METHOCARBAMOL 500 MG: 500 TABLET ORAL at 08:07

## 2022-07-02 RX ADMIN — ENOXAPARIN SODIUM 40 MG: 100 INJECTION SUBCUTANEOUS at 05:07

## 2022-07-02 RX ADMIN — DIPHENHYDRAMINE HYDROCHLORIDE 25 MG: 25 CAPSULE ORAL at 12:07

## 2022-07-02 RX ADMIN — CARVEDILOL 3.12 MG: 3.12 TABLET, FILM COATED ORAL at 08:07

## 2022-07-02 RX ADMIN — DIPHENHYDRAMINE HYDROCHLORIDE 25 MG: 25 CAPSULE ORAL at 09:07

## 2022-07-02 RX ADMIN — DOCUSATE SODIUM 100 MG: 100 CAPSULE, LIQUID FILLED ORAL at 08:07

## 2022-07-02 RX ADMIN — OXYCODONE AND ACETAMINOPHEN 1 TABLET: 10; 325 TABLET ORAL at 12:07

## 2022-07-02 RX ADMIN — LEVOTHYROXINE SODIUM 50 MCG: 25 TABLET ORAL at 08:07

## 2022-07-02 RX ADMIN — OXYCODONE AND ACETAMINOPHEN 1 TABLET: 10; 325 TABLET ORAL at 08:07

## 2022-07-02 RX ADMIN — QUETIAPINE 200 MG: 100 TABLET ORAL at 08:07

## 2022-07-02 RX ADMIN — ALPRAZOLAM 0.5 MG: 0.5 TABLET ORAL at 08:07

## 2022-07-02 NOTE — PROGRESS NOTES
[facility]  Hospital Medicine Progress Note        Chief Complaint: Inpatient Follow-up for hip fracture     HPI:   36 year old male with pmhx of childhood brain tumor s/p excision with chornic developmental delay, anxiety, and hypothyroidism presents to the ED after a fall at home.  Mother at bedside providing history as patient unable to participate in conversation.  Mother reports multiple falls lately and she's having greater difficulty caring for him.  States he tripped and fell last night and she was unable to get him off the floor.  EMS was called and he was transported ot the hospital.  XRs revealed L intertrochanteric femur fracture.  Vitals and labs stable and at baseline.  He does not take any blood thinners.  Compliant with home med regimen.  Orthopedics was consulted and the patient was admitted to the hospitalist group     Interval Hx:  No changes overnight.  Tachycardia slightly better with metoprolol.  Pain controlled.  Working with [employer]pay    Objective/physical exam:  General: In no acute distress, afebrile  Chest: Clear to auscultation bilaterally  Heart: RRR, +S1, S2, no appreciable murmur  Abdomen: Soft, nontender, BS +  MSK: Warm, no lower extremity edema, no clubbing or cyanosis  Neurologic: Alert and oriented x4, Cranial nerve II-XII intact, Strength 5/5 in all 4 extremities    VITAL SIGNS: 24 HRS MIN & MAX LAST   Temp  Min: 97.8 °F (36.6 °C)  Max: 98.7 °F (37.1 °C) 98.7 °F (37.1 °C)   BP  Min: 96/65  Max: 134/81 105/69   Pulse  Min: 106  Max: 128  110   Resp  Min: 16  Max: 18 16   SpO2  Min: 95 %  Max: 100 % 95 %       Recent Labs   Lab 06/30/22  0426 07/01/22  0516 07/02/22  0617   WBC 11.5 7.1 6.5   RBC 3.22* 3.10* 3.25*   HGB 8.8* 8.5* 8.9*   HCT 27.2* 25.2* 26.5*   MCV 84.5 81.3 81.5   MCH 27.3 27.4 27.4   MCHC 32.4* 33.7 33.6   RDW 15.9 15.8 15.6    268 275   MPV 9.7 9.5 9.2       Recent Labs   Lab 06/28/22  0415 06/29/22  0410 06/30/22  0427  07/01/22  0516 07/02/22  0617    135* 138 137 137   K 5.1 4.3 4.1 3.9 4.0   CO2 26 24 29 30* 29   BUN 11.1 13.5 12.6 9.1 8.0*   CREATININE 0.77 0.80 0.73 0.68* 0.66*   CALCIUM 9.3 8.3* 8.5 8.7 8.9   MG 2.30  --   --   --   --    ALBUMIN 3.5 3.2* 3.2* 3.2*  --    ALKPHOS 146 90 97 108  --    ALT 63* 35 32 31  --    AST 41* 29 33 33  --    BILITOT 0.8 0.4 0.8 1.0  --           Microbiology Results (last 7 days)     ** No results found for the last 168 hours. **           See below for Radiology    Scheduled Med:   carvediloL  3.125 mg Oral BID    docusate sodium  100 mg Oral Daily    enoxaparin  40 mg Subcutaneous Daily    hydrOXYzine HCL  25 mg Oral Daily    levothyroxine  50 mcg Oral Daily    methocarbamoL  500 mg Oral TID    QUEtiapine  200 mg Oral QHS        Continuous Infusions:       PRN Meds:  sodium chloride, acetaminophen, acetaminophen, ALPRAZolam, aluminum-magnesium hydroxide-simethicone, bisacodyL, bisacodyL, ceFAZolin 2 g/50mL Dextrose IVPB, diphenhydrAMINE, famotidine, HYDROmorphone, magnesium hydroxide 400 mg/5 ml, metoprolol, morphine, morphine, ondansetron, ondansetron, ondansetron, ondansetron, oxyCODONE-acetaminophen, oxyCODONE-acetaminophen, polyethylene glycol, prochlorperazine, senna, senna-docusate 8.6-50 mg, sodium chloride 0.9%, trazodone       Assessment/Plan:   L intertrochanteric femur fracture, displaced  Fall at home  Essential Hypertension  Hypothyroidism  H/o developmental delay     Hgb coming up this morning. No leukocytosis  Continue prn analgesia  Continue coreg for tachycardia, can increase tomorrow if still elevated  PT/OT daily  Case mgmt working on placement     Patient condition:  Stable    Anticipated discharge and Disposition: TBD      All diagnosis and differential diagnosis have been reviewed; assessment and plan has been documented; I have personally reviewed the labs and test results that are presently available; I have reviewed the patients medication list; I  have reviewed the consulting providers response and recommendations. I have reviewed or attempted to review medical records based upon their availability    All of the patient's questions have been  addressed and answered. Patient's is agreeable to the above stated plan. I will continue to monitor closely and make adjustments to medical management as needed.  _____________________________________________________________________      Radiology:  Fl Modified Barium Swallow Speech  See Speech Therapist Notes    This procedure was auto-finalized.      Clemente Patel MD   07/02/2022

## 2022-07-02 NOTE — PT/OT/SLP PROGRESS
"Occupational Therapy  Treatment    Nilo Claros Jr.   MRN: [id number]   Admitting Diagnosis: Closed comminuted intertrochanteric fracture of left femur        OT Start Time: 0839  OT Stop Time: 0906  OT Total Time (min): 27 min      OT/DAVIDE: DAVIDE     DAVIDE Visit Number: 3    General Precautions: Standard, fall  Orthopedic Precautions:    Braces:    Respiratory Status: Room air         Subjective:  Communicated with RN prior to session.      Pt HR at rest 117 with ax increased to 137 and req 60s to return to 115  Pain/Comfort  Pain Rating 1: 6/10  Location - Side 1: Left  Location 1: leg  Pain Addressed 1: Reposition, Distraction    Objective:   Pt sup>EOB c SBA. Pt donned B socks c DEGROOT and cues for use of sock aid. Pt donned clean shirt EOB c DEGROOT. Pt completed SPT c RW to chair. Pt completed simple G&H tasks sitting c DEGROOT.     A: pt tolerated well, at times pt difficulty to understand 2/2 speech pattern. Pt expressed concern over cell phone not charging. Phone noted with part of charging mechanism having broken off. Pt informed of findings and suggested pt discuss potential phone change with mother. Pt tolerated well.    P: cont c current OT POC                          AM-PAC 6 CLICK ADL   How much help from another person does this patient currently need?   1 = Unable, Total/Dependent Assistance  2 = A lot, Maximum/Moderate Assistance  3 = A little, Minimum/Contact Guard/Supervision  4 = None, Modified [place]/Independent    Putting on and taking off regular lower body clothing? : 2  Bathing (including washing, rinsing, drying)?: 2  Toileting, which includes using toilet, bedpan, or urinal? : 2  Putting on and taking off regular upper body clothing?: 4  Taking care of personal grooming such as brushing teeth?: 4  Eating meals?: 4  Daily Activity Total Score: 18     AM-PAC Raw Score CMS "G-Code Modifier Level of Impairment Assistance   6 % Total / Unable   7 - 8 CM 80 - 100% Maximal Assist   9-13 CL 60 - " 80% Moderate Assist   14 - 19 CK 40 - 60% Moderate Assist   20 - 22 CJ 20 - 40% Minimal Assist   23 CI 1-20% SBA / CGA   24 CH 0% Independent/ Mod I       Patient left up in chair with all lines intact, call button in reach, chair alarm on and RN notified    ASSESSMENT:  Nilo Claros Jr. is a 36 y.o. male with a medical diagnosis of Closed comminuted intertrochanteric fracture of left femur and presents with .    Rehab identified problem list/impairments:      Rehab potential is good.    Activity tolerance: Good    Discharge recommendations: Discharge Facility/Level of Care Needs: rehabilitation facility     Barriers to discharge: Barriers to Discharge: Decreased caregiver support    Equipment recommendations: bedside commode, bath bench, walker, rolling     GOALS:   Multidisciplinary Problems     Occupational Therapy Goals        Problem: Occupational Therapy    Goal Priority Disciplines Outcome Interventions   Occupational Therapy Goal     OT, PT/OT Ongoing, Progressing    Description: Goals to be met by: 7/12/22    Patient will increase functional independence with ADLs by performing:    Feeding with [place].  UE Dressing with [place].  LE Dressing with [place].  Grooming while standing at sink with [place].  Toileting from toilet with [place] for hygiene and clothing management.   Toilet transfer to toilet with [place].                     Plan:  Patient to be seen 5 x/week to address the above listed problems via self-care/home management, therapeutic activities, therapeutic exercises  Plan of Care expires: 07/14/22  Plan of Care reviewed with: patient         07/02/2022

## 2022-07-02 NOTE — PLAN OF CARE
Problem: Adult Inpatient Plan of Care  Goal: Plan of Care Review  Outcome: Ongoing, Progressing  Goal: Readiness for Transition of Care  Outcome: Ongoing, Progressing     Problem: Fall Injury Risk  Goal: Absence of Fall and Fall-Related Injury  Outcome: Ongoing, Progressing

## 2022-07-03 LAB — POCT GLUCOSE: 147 MG/DL (ref 70–110)

## 2022-07-03 PROCEDURE — 63600175 PHARM REV CODE 636 W HCPCS: Performed by: PHYSICIAN ASSISTANT

## 2022-07-03 PROCEDURE — 25000003 PHARM REV CODE 250: Performed by: PHYSICIAN ASSISTANT

## 2022-07-03 PROCEDURE — 11000001 HC ACUTE MED/SURG PRIVATE ROOM

## 2022-07-03 PROCEDURE — 25000003 PHARM REV CODE 250: Performed by: NURSE PRACTITIONER

## 2022-07-03 PROCEDURE — 25000003 PHARM REV CODE 250: Performed by: HOSPITALIST

## 2022-07-03 RX ADMIN — METHOCARBAMOL 500 MG: 500 TABLET ORAL at 09:07

## 2022-07-03 RX ADMIN — CARVEDILOL 3.12 MG: 3.12 TABLET, FILM COATED ORAL at 09:07

## 2022-07-03 RX ADMIN — CARVEDILOL 3.12 MG: 3.12 TABLET, FILM COATED ORAL at 08:07

## 2022-07-03 RX ADMIN — LEVOTHYROXINE SODIUM 50 MCG: 25 TABLET ORAL at 09:07

## 2022-07-03 RX ADMIN — DIPHENHYDRAMINE HYDROCHLORIDE 25 MG: 25 CAPSULE ORAL at 08:07

## 2022-07-03 RX ADMIN — ACETAMINOPHEN 650 MG: 325 TABLET, FILM COATED ORAL at 09:07

## 2022-07-03 RX ADMIN — DIPHENHYDRAMINE HYDROCHLORIDE 25 MG: 25 CAPSULE ORAL at 09:07

## 2022-07-03 RX ADMIN — ALPRAZOLAM 0.5 MG: 0.5 TABLET ORAL at 01:07

## 2022-07-03 RX ADMIN — DOCUSATE SODIUM 100 MG: 100 CAPSULE, LIQUID FILLED ORAL at 09:07

## 2022-07-03 RX ADMIN — DIPHENHYDRAMINE HYDROCHLORIDE 25 MG: 25 CAPSULE ORAL at 03:07

## 2022-07-03 RX ADMIN — HYDROXYZINE HYDROCHLORIDE 25 MG: 25 TABLET ORAL at 09:07

## 2022-07-03 RX ADMIN — OXYCODONE AND ACETAMINOPHEN 1 TABLET: 10; 325 TABLET ORAL at 01:07

## 2022-07-03 RX ADMIN — METHOCARBAMOL 500 MG: 500 TABLET ORAL at 03:07

## 2022-07-03 RX ADMIN — QUETIAPINE 200 MG: 100 TABLET ORAL at 08:07

## 2022-07-03 RX ADMIN — ACETAMINOPHEN 650 MG: 325 TABLET, FILM COATED ORAL at 03:07

## 2022-07-03 RX ADMIN — METHOCARBAMOL 500 MG: 500 TABLET ORAL at 08:07

## 2022-07-03 RX ADMIN — OXYCODONE HYDROCHLORIDE AND ACETAMINOPHEN 1 TABLET: 5; 325 TABLET ORAL at 09:07

## 2022-07-03 RX ADMIN — ENOXAPARIN SODIUM 40 MG: 100 INJECTION SUBCUTANEOUS at 04:07

## 2022-07-03 NOTE — PROGRESS NOTES
Nutrition   Progress Note      Recommendations:  1. Recommend SLP eval for possible diet upgrade. Recommend Regular diet  2. Monitor intake, wts, labs      Reason for Evaluation:  Length of Stay    Diagnosis:    1. Hip fracture    2. Chest pain    3. Closed comminuted intertrochanteric fracture of left femur, initial encounter    4. Tachycardia        Relevant Medical History:  No past medical history on file.      Nutrition Diet History:    Factors affecting nutritional intake: difficulty / impaired swallowing    Food / Taoism / Culture Preferences:  Does not like the altered diet      Nutrition Prescription Ordered:    Current Diet Order: Minced and Moist with mildly thick liquids    Appetite:  Fair (50% - 75% po intake)    PO intake: 50 - 75 %      Labs / Medications / Procedures:    Nutrition Related Medications: docusate, oxycodone    Nutrition Related Labs:  7/2: H/H-8.9/26.5, Bun-8.0, Crea-0.66      Anthropometrics:  Height:  not available  Admit Weight:  Weight: 66.2 kg (145 lb 15.1 oz)  Latest Weight:  66.2 kg (145 lb 15.1 oz)    Wt Readings from Last 5 Encounters:   06/30/22 66.2 kg (145 lb 15.1 oz)     IBW: n/a  %IBW: n/a  UBW: 66kg  %Weight Change: stable  There is no height or weight on file to calculate BMI.  BMI classification:  n/a      Nutrition Narrative:  7/3: Pt stated he wants regular food. Nsg stated she will speak with SLP for possible diet upgrade since pt is doing much better. S/p Left IM nailing of hip fx on 6/28. Pt also noted they are checking his blood sugars and he is not diabetic-nsg notifying MD to discontinue CBG checks. Pt noted +pain of hip.     Monitoring and Evaluation:    Nutrition Monitoring and Evaluation:  food and beverage intake and diet order    Nutrition Risk:  Level of Nutrition Risk:  Low  Frequency of Follow up:  Dietitian will f/up within 7 days.

## 2022-07-04 PROCEDURE — 25000003 PHARM REV CODE 250: Performed by: HOSPITALIST

## 2022-07-04 PROCEDURE — 11000001 HC ACUTE MED/SURG PRIVATE ROOM

## 2022-07-04 PROCEDURE — 63600175 PHARM REV CODE 636 W HCPCS: Performed by: PHYSICIAN ASSISTANT

## 2022-07-04 PROCEDURE — 25000003 PHARM REV CODE 250: Performed by: NURSE PRACTITIONER

## 2022-07-04 PROCEDURE — 25000003 PHARM REV CODE 250: Performed by: PHYSICIAN ASSISTANT

## 2022-07-04 RX ADMIN — LEVOTHYROXINE SODIUM 50 MCG: 25 TABLET ORAL at 09:07

## 2022-07-04 RX ADMIN — ENOXAPARIN SODIUM 40 MG: 100 INJECTION SUBCUTANEOUS at 04:07

## 2022-07-04 RX ADMIN — HYDROXYZINE HYDROCHLORIDE 25 MG: 25 TABLET ORAL at 09:07

## 2022-07-04 RX ADMIN — OXYCODONE AND ACETAMINOPHEN 1 TABLET: 10; 325 TABLET ORAL at 04:07

## 2022-07-04 RX ADMIN — METHOCARBAMOL 500 MG: 500 TABLET ORAL at 09:07

## 2022-07-04 RX ADMIN — METHOCARBAMOL 500 MG: 500 TABLET ORAL at 02:07

## 2022-07-04 RX ADMIN — OXYCODONE AND ACETAMINOPHEN 1 TABLET: 10; 325 TABLET ORAL at 11:07

## 2022-07-04 RX ADMIN — DOCUSATE SODIUM 100 MG: 100 CAPSULE, LIQUID FILLED ORAL at 09:07

## 2022-07-04 RX ADMIN — QUETIAPINE 200 MG: 100 TABLET ORAL at 09:07

## 2022-07-04 RX ADMIN — ACETAMINOPHEN 650 MG: 325 TABLET, FILM COATED ORAL at 09:07

## 2022-07-04 RX ADMIN — CARVEDILOL 3.12 MG: 3.12 TABLET, FILM COATED ORAL at 09:07

## 2022-07-04 RX ADMIN — DIPHENHYDRAMINE HYDROCHLORIDE 25 MG: 25 CAPSULE ORAL at 06:07

## 2022-07-04 NOTE — PROGRESS NOTES
[facility]  Hospital Medicine Progress Note        Chief Complaint: Inpatient Follow-up for     HPI:   36 year old male with pmhx of childhood brain tumor s/p excision with chornic developmental delay, anxiety, and hypothyroidism presents to the ED after a fall at home. Mother at bedside providing history as patient unable to participate in conversation. Mother reports multiple falls lately and she's having greater difficulty caring for him. States he tripped and fell last night and she was unable to get him off the floor. EMS was called and he was transported ot the hospital. XRs revealed L intertrochanteric femur fracture. Vitals and labs stable and at baseline. He does not take any blood thinners. Compliant with home med regimen. Orthopedics was consulted and the patient was admitted to the hospitalist group   A July 4, 2022-no complaints, no fever, no shortness of breath, no nausea    Interval Hx:       Objective/physical exam:  General: In no acute distress, afebrile  Chest: Clear to auscultation bilaterally  Heart: RRR, +S1, S2, no appreciable murmur  Abdomen: Soft, nontender, BS +  MSK: Warm, no lower extremity edema, no clubbing or cyanosis, left hip covered with a dressing  Neurologic:  Developmental delay, Cranial nerve II-XII intact, Strength 5/5 in all 4 extremities    VITAL SIGNS: 24 HRS MIN & MAX LAST   Temp  Min: 97.3 °F (36.3 °C)  Max: 98.4 °F (36.9 °C) 98.4 °F (36.9 °C)   BP  Min: 108/73  Max: 134/88 108/73   Pulse  Min: 98  Max: 105  98   Resp  Min: 13  Max: 18 13   SpO2  Min: 97 %  Max: 99 % 97 %       Recent Labs   Lab 06/30/22  0426 07/01/22  0516 07/02/22  0617   WBC 11.5 7.1 6.5   RBC 3.22* 3.10* 3.25*   HGB 8.8* 8.5* 8.9*   HCT 27.2* 25.2* 26.5*   MCV 84.5 81.3 81.5   MCH 27.3 27.4 27.4   MCHC 32.4* 33.7 33.6   RDW 15.9 15.8 15.6    268 275   MPV 9.7 9.5 9.2       Recent Labs   Lab 06/28/22  0415 06/29/22  0410 06/30/22  0427 07/01/22  0516 07/02/22  0617     135* 138 137 137   K 5.1 4.3 4.1 3.9 4.0   CO2 26 24 29 30* 29   BUN 11.1 13.5 12.6 9.1 8.0*   CREATININE 0.77 0.80 0.73 0.68* 0.66*   CALCIUM 9.3 8.3* 8.5 8.7 8.9   MG 2.30  --   --   --   --    ALBUMIN 3.5 3.2* 3.2* 3.2*  --    ALKPHOS 146 90 97 108  --    ALT 63* 35 32 31  --    AST 41* 29 33 33  --    BILITOT 0.8 0.4 0.8 1.0  --           Microbiology Results (last 7 days)     ** No results found for the last 168 hours. **           See below for Radiology    Scheduled Med:   carvediloL  3.125 mg Oral BID    docusate sodium  100 mg Oral Daily    enoxaparin  40 mg Subcutaneous Daily    hydrOXYzine HCL  25 mg Oral Daily    levothyroxine  50 mcg Oral Daily    methocarbamoL  500 mg Oral TID    QUEtiapine  200 mg Oral QHS        Continuous Infusions:       PRN Meds:  sodium chloride, acetaminophen, acetaminophen, ALPRAZolam, aluminum-magnesium hydroxide-simethicone, bisacodyL, bisacodyL, ceFAZolin 2 g/50mL Dextrose IVPB, diphenhydrAMINE, famotidine, HYDROmorphone, magnesium hydroxide 400 mg/5 ml, metoprolol, morphine, morphine, ondansetron, ondansetron, ondansetron, ondansetron, oxyCODONE-acetaminophen, oxyCODONE-acetaminophen, polyethylene glycol, prochlorperazine, senna, senna-docusate 8.6-50 mg, sodium chloride 0.9%, trazodone       Assessment/Plan:    Left intertrochanteric femur fracture, displaced   Status post intramedullary nailing June 28, 2022  Fall at home   Essential Hypertension   Hypothyroidism   H/o developmental delay    Continue physical therapy  Consult orthopedic surgeon  consult  for placement  Check CBC BMP in a.m.    VTE prophylaxis:     Patient condition:  Stable/Fair/Guarded/ Serious/ Critical    Anticipated discharge and Disposition:         All diagnosis and differential diagnosis have been reviewed; assessment and plan has been documented; I have personally reviewed the labs and test results that are presently available; I have reviewed the patients medication  list; I have reviewed the consulting providers response and recommendations. I have reviewed or attempted to review medical records based upon their availability    All of the patient's questions have been  addressed and answered. Patient's is agreeable to the above stated plan. I will continue to monitor closely and make adjustments to medical management as needed.  _____________________________________________________________________    Nutrition Status:    Radiology:  Fl Modified Barium Swallow Speech  See Speech Therapist Notes    This procedure was auto-finalized.      Marianna Ball MD   07/04/2022

## 2022-07-05 LAB — POCT GLUCOSE: 98 MG/DL (ref 70–110)

## 2022-07-05 PROCEDURE — 97116 GAIT TRAINING THERAPY: CPT | Mod: CQ

## 2022-07-05 PROCEDURE — 25000003 PHARM REV CODE 250: Performed by: PHYSICIAN ASSISTANT

## 2022-07-05 PROCEDURE — 97530 THERAPEUTIC ACTIVITIES: CPT | Mod: CQ

## 2022-07-05 PROCEDURE — 63600175 PHARM REV CODE 636 W HCPCS: Performed by: PHYSICIAN ASSISTANT

## 2022-07-05 PROCEDURE — 92526 ORAL FUNCTION THERAPY: CPT

## 2022-07-05 PROCEDURE — 11000001 HC ACUTE MED/SURG PRIVATE ROOM

## 2022-07-05 PROCEDURE — 25000003 PHARM REV CODE 250: Performed by: HOSPITALIST

## 2022-07-05 PROCEDURE — 97535 SELF CARE MNGMENT TRAINING: CPT | Mod: CO

## 2022-07-05 RX ADMIN — HYDROXYZINE HYDROCHLORIDE 25 MG: 25 TABLET ORAL at 08:07

## 2022-07-05 RX ADMIN — METHOCARBAMOL 500 MG: 500 TABLET ORAL at 03:07

## 2022-07-05 RX ADMIN — CARVEDILOL 3.12 MG: 3.12 TABLET, FILM COATED ORAL at 08:07

## 2022-07-05 RX ADMIN — METHOCARBAMOL 500 MG: 500 TABLET ORAL at 08:07

## 2022-07-05 RX ADMIN — OXYCODONE HYDROCHLORIDE AND ACETAMINOPHEN 1 TABLET: 5; 325 TABLET ORAL at 08:07

## 2022-07-05 RX ADMIN — ENOXAPARIN SODIUM 40 MG: 100 INJECTION SUBCUTANEOUS at 05:07

## 2022-07-05 RX ADMIN — DIPHENHYDRAMINE HYDROCHLORIDE 25 MG: 25 CAPSULE ORAL at 05:07

## 2022-07-05 RX ADMIN — OXYCODONE HYDROCHLORIDE AND ACETAMINOPHEN 1 TABLET: 5; 325 TABLET ORAL at 05:07

## 2022-07-05 RX ADMIN — DOCUSATE SODIUM 100 MG: 100 CAPSULE, LIQUID FILLED ORAL at 08:07

## 2022-07-05 RX ADMIN — QUETIAPINE 200 MG: 100 TABLET ORAL at 08:07

## 2022-07-05 RX ADMIN — OXYCODONE HYDROCHLORIDE AND ACETAMINOPHEN 1 TABLET: 5; 325 TABLET ORAL at 12:07

## 2022-07-05 RX ADMIN — LEVOTHYROXINE SODIUM 50 MCG: 25 TABLET ORAL at 08:07

## 2022-07-05 NOTE — PLAN OF CARE
07/05/22 0925   Discharge Reassessment   Assessment Type Discharge Planning Reassessment   Did the patient's condition or plan change since previous assessment? No   Discharge Plan discussed with: Patient;Parent(s)   Discharge Plan A Rehab   Discharge Plan B Rehab   DME Needed Upon Discharge  none   Discharge Barriers Identified None   Post-Acute Status   Post-Acute Authorization Placement   Post-Acute Placement Status Pending payor review/awaiting authorization (if required)  ([facility] [place] [place] [facility] and is submitting for insurance authorization)

## 2022-07-05 NOTE — PROGRESS NOTES
[facility]  Hospital Medicine Progress Note        Chief Complaint: Inpatient Follow-up for     HPI: 36 year old male with pmhx of childhood brain tumor s/p excision with chornic developmental delay, anxiety, and hypothyroidism presents to the ED after a fall at home. Mother at bedside providing history as patient unable to participate in conversation. Mother reports multiple falls lately and she's having greater difficulty caring for him. States he tripped and fell last night and she was unable to get him off the floor. EMS was called and he was transported ot the hospital. XRs revealed L intertrochanteric femur fracture. Vitals and labs stable and at baseline. He does not take any blood thinners. Compliant with home med regimen. Orthopedics was consulted and the patient was admitted to the hospitalist group   A July 4, 2022-no complaints, no fever, no shortness of breath, no nausea  The July 5, 2022, patient underwent physical therapy this morning, physical therapist recommended a rehab placement    Interval Hx:       Objective/physical exam:  General: In no acute distress, afebrile  Chest: Clear to auscultation bilaterally  Heart: RRR, +S1, S2, no appreciable murmur  Abdomen: Soft, nontender, BS +  MSK: Warm, no lower extremity edema, no clubbing or cyanosis, left hip covered with dressing  Neurologic: Alert and oriented x4, Cranial nerve II-XII intact, Strength 5/5 in all 4 extremities    VITAL SIGNS: 24 HRS MIN & MAX LAST   Temp  Min: 97.9 °F (36.6 °C)  Max: 98.4 °F (36.9 °C) 98.2 °F (36.8 °C)   BP  Min: 102/65  Max: 130/93 118/74   Pulse  Min: 98  Max: 111  (!) 111   Resp  Min: 13  Max: 18 16   SpO2  Min: 97 %  Max: 100 % 97 %       Recent Labs   Lab 06/30/22  0426 07/01/22  0516 07/02/22  0617   WBC 11.5 7.1 6.5   RBC 3.22* 3.10* 3.25*   HGB 8.8* 8.5* 8.9*   HCT 27.2* 25.2* 26.5*   MCV 84.5 81.3 81.5   MCH 27.3 27.4 27.4   MCHC 32.4* 33.7 33.6   RDW 15.9 15.8 15.6    268  275   MPV 9.7 9.5 9.2       Recent Labs   Lab 06/29/22  0410 06/30/22  0427 07/01/22  0516 07/02/22  0617   * 138 137 137   K 4.3 4.1 3.9 4.0   CO2 24 29 30* 29   BUN 13.5 12.6 9.1 8.0*   CREATININE 0.80 0.73 0.68* 0.66*   CALCIUM 8.3* 8.5 8.7 8.9   ALBUMIN 3.2* 3.2* 3.2*  --    ALKPHOS 90 97 108  --    ALT 35 32 31  --    AST 29 33 33  --    BILITOT 0.4 0.8 1.0  --           Microbiology Results (last 7 days)     ** No results found for the last 168 hours. **           See below for Radiology    Scheduled Med:   carvediloL  3.125 mg Oral BID    docusate sodium  100 mg Oral Daily    enoxaparin  40 mg Subcutaneous Daily    hydrOXYzine HCL  25 mg Oral Daily    levothyroxine  50 mcg Oral Daily    methocarbamoL  500 mg Oral TID    QUEtiapine  200 mg Oral QHS        Continuous Infusions:       PRN Meds:  sodium chloride, acetaminophen, acetaminophen, ALPRAZolam, aluminum-magnesium hydroxide-simethicone, bisacodyL, bisacodyL, ceFAZolin 2 g/50mL Dextrose IVPB, diphenhydrAMINE, famotidine, HYDROmorphone, magnesium hydroxide 400 mg/5 ml, metoprolol, morphine, morphine, ondansetron, ondansetron, ondansetron, ondansetron, oxyCODONE-acetaminophen, oxyCODONE-acetaminophen, polyethylene glycol, prochlorperazine, senna, senna-docusate 8.6-50 mg, sodium chloride 0.9%, trazodone       Assessment/Plan:    Left intertrochanteric femur fracture, displaced   Status post intramedullary nailing June 28, 2022   Fall at home   Essential Hypertension   Hypothyroidism   H/o developmental delay     continue Lovenox 40 mg subQ q.d.  Continue physical therapy   Consult orthopedic surgeon   consult  for reahb placement    VTE prophylaxis:     Patient condition:  Stable/Fair/Guarded/ Serious/ Critical    Anticipated discharge and Disposition:         All diagnosis and differential diagnosis have been reviewed; assessment and plan has been documented; I have personally reviewed the labs and test results that are presently  available; I have reviewed the patients medication list; I have reviewed the consulting providers response and recommendations. I have reviewed or attempted to review medical records based upon their availability    All of the patient's questions have been  addressed and answered. Patient's is agreeable to the above stated plan. I will continue to monitor closely and make adjustments to medical management as needed.  _____________________________________________________________________    Nutrition Status:    Radiology:  Fl Modified Barium Swallow Speech  See Speech Therapist Notes    This procedure was auto-finalized.      Marianna Ball MD   07/05/2022

## 2022-07-05 NOTE — PT/OT/SLP PROGRESS
Occupational Therapy  Treatment    Nilo Claros Jr.   MRN: [id number]   Admitting Diagnosis: Closed comminuted intertrochanteric fracture of left femur    OT Date of Treatment: 07/05/22   OT Start Time: 0936  OT Stop Time: 1010  OT Total Time (min): 34 min     Billable Minutes:  Self Care/Home Management 34  Total Minutes: 34     OT/DAVIDE: DAVIDE     DAVIDE Visit Number: 4    General Precautions: Standard, fall  Orthopedic Precautions: LLE weight bearing as tolerated  Braces: N/A    Spiritual, Cultural Beliefs, Adventist Practices, Values that Affect Care: no    Subjective:  Communicated with nurse prior to session.    Pain/Comfort  Pain Rating 1: 6/10  Location - Side 1: Left  Location 1: hip  Pain Addressed 1: Pre-medicate for activity    Objective:  Patient found with: telemetry    Functional Mobility:  Bed Mobility:   Supine to sit: Contact Guard Assistance   Sit to supine: Activity did not occur   Rolling: Contact Guard Assistance   Scooting: Minimal Assistance    Transfer Training:   Sit to stand and toilet transfer with Min A, poor safety awareness, functional mobility in room with Min A    LE Dressing:  Patient don/doffed socks with Minimal Assistance with dressing stick, reacher and sock aide    Balance:   Static Sit: FAIR+: Able to take MINIMAL challenges from all directions  Dynamic Sit:  FAIR+: Maintains balance through MINIMAL excursions of active trunk motion    Patient left up in chair with call button in reach    ASSESSMENT:  Nilo Claros Jr. is a 36 y.o. male with a medical diagnosis of Closed comminuted intertrochanteric fracture of left femur and presents with decreased safety awareness, decreased ADL skills, decreased Ax tolerance and decreased strength.    Rehab potential is good    Activity tolerance: Good    Discharge recommendations: rehabilitation facility     Equipment recommendations: walker, rolling     GOALS:   Multidisciplinary Problems     Occupational Therapy Goals        Problem:  Occupational Therapy    Goal Priority Disciplines Outcome Interventions   Occupational Therapy Goal     OT, PT/OT Ongoing, Progressing    Description: Goals to be met by: 7/12/22    Patient will increase functional independence with ADLs by performing:    Feeding with [place].  UE Dressing with [place].  LE Dressing with [place].  Grooming while standing at sink with [place].  Toileting from toilet with [place] for hygiene and clothing management.   Toilet transfer to toilet with [place].                     Plan:  Patient to be seen 5 x/week to address the above listed problems via self-care/home management, therapeutic activities, therapeutic exercises  Plan of Care expires: 07/14/22  Plan of Care reviewed with: patient, mother         07/05/2022

## 2022-07-05 NOTE — PT/OT/SLP PROGRESS
Speech Language Pathology Department  Dysphagia Therapy    Patient Name:  Nilo Claros Jr.   MRN:  [id number]  Admitting Diagnosis: Closed comminuted intertrochanteric fracture of left femur    Recommendations:     General Recommendations:  Dysphagia therapy  Diet recommendations:  Minced & Moist Diet - IDDSI Level 5, Liquid Diet Level: Mildly thick liquids - IDDSI Level 2   Aspiration Precautions: small bites/sips and slow rate    Discharge recommendations:  rehabilitation facility   Barriers to Discharge:  Level of Skilled Assistance Needed      Subjective     Patient awake, alert and cooperative.    Pain/Comfort:  · Pain Rating 1: 0/10    Respiratory Status: room air     Objective:     Oral Musculature Evaluation:   Oral Musculature: WFL   Dentition: gums in poor condition   Voice Prior to PO Intake: adequate    Pt completed base of tongue and laryngeal x80 with minimal-moderate cues.  Cielo maneuver x5 with minimal cues.     Assessment:     Pt continues to present with dysphagia requiring diet modification to reduce the risk of aspiration.     Goals:   Multidisciplinary Problems     SLP Goals        Problem: SLP    Goal Priority Disciplines Outcome   SLP Goal     SLP Ongoing, Progressing   Description: LTG: Pt will tolerate least restrictive PO diet with no clinical signs/sx aspiration    STGs:  Pt will tolerate easy to chew solids with improved bolus formation/transport  Pt will complete laryngeal strengthening exercises and cielo maneuver with minimal-moderate cues.  Pt will tolerate thermal stimulation to the anterior faucial pillars with 100% effortful swallows and delay less than 2 seconds.                    Plan:     Will continue to follow and tx as appropriate.    Patient to be seen:  5 x/week   Plan of Care expires:  07/27/22  Plan of Care reviewed with:  patient, mother   SLP Follow-Up:  Yes       Time Tracking:     SLP Treatment Date:   07/05/22  Speech Start Time:  1335  Speech Stop Time:   1350     Speech Total Time (min):  15 min    Billable minutes:  Treatment of Swallow Dysfunction, 15 minutes       07/05/2022

## 2022-07-05 NOTE — PT/OT/SLP PROGRESS
Physical Therapy         Treatment        Nilo Claros Jr.   MRN: [id number]     PT Received On: 07/05/22  PT Start Time: 0936     PT Stop Time: 1010    PT Total Time (min): 34 min       Billable Minutes:  Gait [id number] and Therapeutic Activity 19  Total Minutes: 34    Treatment Type: Treatment  PT/PTA: PTA     PTA Visit Number: 5       General Precautions: Standard, fall  Orthopedic Precautions: Orthopedic Precautions : LLE weight bearing as tolerated   Braces:      Spiritual, Cultural Beliefs, Mu-ism Practices, Values that Affect Care: no    Subjective:  Communicated with nurse prior to session.         Objective:  Patient found supine in bed, with Patient found with: telemetry    Functional Mobility:  Bed Mobility:   Supine to sit: Minimal Assistance   Sit to supine: Activity did not occur   Rolling: Minimal Assistance   Scooting: Minimal Assistance    Balance:   Static Sit: FAIR: Maintains without assist, but unable to take any challenges   Dynamic Sit:  FAIR: Cannot move trunk without losing balance  Static Stand: FAIR: Maintains without assist but unable to take challenges  Dynamic stand: FAIR: Needs CONTACT GUARD during gait    Transfer Training:  Sit to stand:Minimal Assistance with Rolling Walker x4 trials with cues for safety awareness    Gait Training:  Patient gait trained   45  Feet, 25 feet x2 trials on level tile with Rolling Walker with Contact Guard Assistance.  Pt with demonstarting a  reciprocal gait with decreased kolton, decreased step length and decreased weight-shifting ability.Impairments contributing to gait deviations include impaired balance and decreased strength. Pt with noted decreased weight bearing through LLE with cues to correct.       Additional Treatment:  Pt performed LAQs on LLE x15 reps with cues for decreased compensatory strategies.     Activity Tolerance:  Patient tolerated treatment well    Patient left up in chair with all lines intact, call button in reach and  chair alarm on.    Assessment:  Nilo Claros Jr. is a 36 y.o. male with a medical diagnosis of Closed comminuted intertrochanteric fracture of left femur. He presents with increased strength.    Rehab potential is good.    Activity tolerance: Good    Discharge recommendations: Discharge Facility/Level of Care Needs: rehabilitation facility     Equipment recommendations: Equipment Needed After Discharge: walker, rolling     GOALS:   Multidisciplinary Problems     Physical Therapy Goals        Problem: Physical Therapy    Goal Priority Disciplines Outcome Goal Variances Interventions   Physical Therapy Goal     PT, PT/OT Ongoing, Progressing     Description: Goals to be met by: 22     Patient will increase functional independence with mobility by performin. Supine to sit with MInimal Assistance  2. Sit to supine with MInimal Assistance  3. Sit to stand transfer with Minimal Assistance  4. Bed to chair transfer with Minimal Assistance using Rolling Walker  5. Gait  x 50 feet with Minimal Assistance using Rolling Walker.                      PLAN:    Patient to be seen BID  to address the above listed problems via gait training, therapeutic activities  Plan of Care expires: 22  Plan of Care reviewed with: patient, mother         2022

## 2022-07-06 LAB — POCT GLUCOSE: 110 MG/DL (ref 70–110)

## 2022-07-06 PROCEDURE — 92526 ORAL FUNCTION THERAPY: CPT

## 2022-07-06 PROCEDURE — 11000001 HC ACUTE MED/SURG PRIVATE ROOM

## 2022-07-06 PROCEDURE — 63600175 PHARM REV CODE 636 W HCPCS: Performed by: PHYSICIAN ASSISTANT

## 2022-07-06 PROCEDURE — 25000003 PHARM REV CODE 250: Performed by: PHYSICIAN ASSISTANT

## 2022-07-06 PROCEDURE — 97535 SELF CARE MNGMENT TRAINING: CPT | Mod: CO

## 2022-07-06 PROCEDURE — 97110 THERAPEUTIC EXERCISES: CPT

## 2022-07-06 PROCEDURE — 25000003 PHARM REV CODE 250: Performed by: HOSPITALIST

## 2022-07-06 PROCEDURE — 97164 PT RE-EVAL EST PLAN CARE: CPT

## 2022-07-06 PROCEDURE — 97530 THERAPEUTIC ACTIVITIES: CPT | Mod: CO

## 2022-07-06 RX ORDER — QUETIAPINE FUMARATE 200 MG/1
200 TABLET, FILM COATED ORAL NIGHTLY
Qty: 30 TABLET | Refills: 11 | Status: SHIPPED | OUTPATIENT
Start: 2022-07-06 | End: 2023-07-06

## 2022-07-06 RX ORDER — ALPRAZOLAM 0.5 MG/1
0.5 TABLET ORAL 3 TIMES DAILY PRN
Qty: 21 TABLET | Refills: 0 | Status: SHIPPED | OUTPATIENT
Start: 2022-07-06 | End: 2022-07-13

## 2022-07-06 RX ORDER — CARVEDILOL 3.12 MG/1
3.12 TABLET ORAL 2 TIMES DAILY
Qty: 60 TABLET | Refills: 11 | Status: SHIPPED | OUTPATIENT
Start: 2022-07-06 | End: 2023-07-06

## 2022-07-06 RX ORDER — DOCOSANOL 100 MG/G
CREAM TOPICAL
Status: DISCONTINUED | OUTPATIENT
Start: 2022-07-06 | End: 2022-07-17 | Stop reason: HOSPADM

## 2022-07-06 RX ORDER — OXYCODONE AND ACETAMINOPHEN 10; 325 MG/1; MG/1
1 TABLET ORAL EVERY 4 HOURS PRN
Qty: 28 TABLET | Refills: 0 | Status: SHIPPED | OUTPATIENT
Start: 2022-07-06 | End: 2022-07-17 | Stop reason: HOSPADM

## 2022-07-06 RX ADMIN — DIPHENHYDRAMINE HYDROCHLORIDE 25 MG: 25 CAPSULE ORAL at 08:07

## 2022-07-06 RX ADMIN — METHOCARBAMOL 500 MG: 500 TABLET ORAL at 08:07

## 2022-07-06 RX ADMIN — CARVEDILOL 3.12 MG: 3.12 TABLET, FILM COATED ORAL at 08:07

## 2022-07-06 RX ADMIN — DOCOSANOL 10 % TOPICAL CREAM: at 11:07

## 2022-07-06 RX ADMIN — DIPHENHYDRAMINE HYDROCHLORIDE 25 MG: 25 CAPSULE ORAL at 04:07

## 2022-07-06 RX ADMIN — DOCOSANOL 10 % TOPICAL CREAM: at 06:07

## 2022-07-06 RX ADMIN — OXYCODONE HYDROCHLORIDE AND ACETAMINOPHEN 1 TABLET: 5; 325 TABLET ORAL at 04:07

## 2022-07-06 RX ADMIN — DOCOSANOL 10 % TOPICAL CREAM: at 09:07

## 2022-07-06 RX ADMIN — METHOCARBAMOL 500 MG: 500 TABLET ORAL at 02:07

## 2022-07-06 RX ADMIN — ENOXAPARIN SODIUM 40 MG: 100 INJECTION SUBCUTANEOUS at 04:07

## 2022-07-06 RX ADMIN — DOCOSANOL 10 % TOPICAL CREAM: at 02:07

## 2022-07-06 RX ADMIN — DOCUSATE SODIUM 100 MG: 100 CAPSULE, LIQUID FILLED ORAL at 08:07

## 2022-07-06 RX ADMIN — OXYCODONE HYDROCHLORIDE AND ACETAMINOPHEN 1 TABLET: 5; 325 TABLET ORAL at 08:07

## 2022-07-06 RX ADMIN — HYDROXYZINE HYDROCHLORIDE 25 MG: 25 TABLET ORAL at 08:07

## 2022-07-06 RX ADMIN — DIPHENHYDRAMINE HYDROCHLORIDE 25 MG: 25 CAPSULE ORAL at 11:07

## 2022-07-06 RX ADMIN — LEVOTHYROXINE SODIUM 50 MCG: 25 TABLET ORAL at 08:07

## 2022-07-06 RX ADMIN — METHOCARBAMOL 500 MG: 500 TABLET ORAL at 09:07

## 2022-07-06 RX ADMIN — CARVEDILOL 3.12 MG: 3.12 TABLET, FILM COATED ORAL at 09:07

## 2022-07-06 RX ADMIN — QUETIAPINE 200 MG: 100 TABLET ORAL at 09:07

## 2022-07-06 RX ADMIN — OXYCODONE HYDROCHLORIDE AND ACETAMINOPHEN 1 TABLET: 5; 325 TABLET ORAL at 01:07

## 2022-07-06 RX ADMIN — OXYCODONE AND ACETAMINOPHEN 1 TABLET: 10; 325 TABLET ORAL at 09:07

## 2022-07-06 NOTE — PT/OT/SLP PROGRESS
Physical Therapy Treatment    Patient Name:  Nilo Claros Jr.   MRN:  [id number]    Recommendations:     Discharge Recommendations:  rehabilitation facility   Discharge Equipment Recommendations: walker, rolling   Barriers to discharge: Increased need of assistance    Assessment:     Nilo Claros Jr. is a 36 y.o. male admitted with a medical diagnosis of Closed comminuted intertrochanteric fracture of left femur.  He presents with the following impairments/functional limitations:  weakness, impaired self care skills, impaired functional mobilty, gait instability, decreased lower extremity function, decreased safety awareness, pain.    Rehab Prognosis: Good; patient would benefit from acute skilled PT services to address these deficits and reach maximum level of function.    Recent Surgery: Procedure(s) (LRB):  INSERTION, INTRAMEDULLARY AMIRAH, FEMUR (Left) 8 Days Post-Op    Plan:     During this hospitalization, patient to be seen BID to address the identified rehab impairments via gait training, therapeutic activities, therapeutic exercises, neuromuscular re-education and progress toward the following goals:    · Plan of Care Expires:  07/28/22    Subjective     Chief Complaint: pain  Patient/Family Comments/goals: none  Pain/Comfort:  · Pain Rating 1: 7/10  · Location - Side 1: Left  · Location 1: hip  · Pain Addressed 1: Distraction, Reposition  · Pain Rating Post-Intervention 1: 7/10      Objective:     Communicated with nurse prior to session.  Patient found HOB elevated with telemetry upon PT entry to room.     General Precautions: Standard, fall   Orthopedic Precautions:LLE weight bearing as tolerated   Braces: N/A  Respiratory Status: Room air    AM-PAC 6 CLICK MOBILITY  Turning over in bed (including adjusting bedclothes, sheets and blankets)?: 4  Sitting down on and standing up from a chair with arms (e.g., wheelchair, bedside commode, etc.): 3  Moving from lying on back to sitting on the side of  the bed?: 3  Moving to and from a bed to a chair (including a wheelchair)?: 3  Need to walk in hospital room?: 3  Climbing 3-5 steps with a railing?: 1  Basic Mobility Total Score: 17       Therapeutic Activities and Exercises:   Therex:   Patient performed seated Marches, Heel Raises, LAQ, Glute Sets, Resisted Hip ABD/ADD. All performed 2 x 10 reps.      Patient left HOB elevated with all lines intact and call button in reach..    GOALS:   Multidisciplinary Problems     Physical Therapy Goals        Problem: Physical Therapy    Goal Priority Disciplines Outcome Goal Variances Interventions   Physical Therapy Goal     PT, PT/OT Ongoing, Progressing     Description: Goals to be met by: 22     Patient will increase functional independence with mobility by performin. Supine to sit with MInimal Assistance  2. Sit to supine with MInimal Assistance  3. Sit to stand transfer with Minimal Assistance  4. Bed to chair transfer with Minimal Assistance using Rolling Walker  5. Gait  x 50 feet with Minimal Assistance using Rolling Walker.                      Time Tracking:     PT Received On: 22  PT Start Time: 1425     PT Stop Time: 1435  PT Total Time (min): 10 min     Billable Minutes: Therapeutic Exercise 10 minutes    Treatment Type: Treatment  PT/PTA: PT     PTA Visit Number: 0     2022

## 2022-07-06 NOTE — PT/OT/SLP PROGRESS
Speech Language Pathology Department  Dysphagia Therapy    Patient Name:  Nilo Claros Jr.   MRN:  [id number]  Admitting Diagnosis: Closed comminuted intertrochanteric fracture of left femur    Recommendations:     General Recommendations:  Dysphagia therapy  Diet recommendations:  Soft & Bite Sized Diet - IDDSI Level 6, Liquid Diet Level: Thin liquids - IDDSI Level 0   Aspiration Precautions: small bites/sips, slow rate and alternate solids/liquids    Discharge recommendations:  rehabilitation facility   Barriers to Discharge:  Level of Skilled Assistance Needed      Subjective     Patient awake, alert, oriented x3 and cooperative.    Patient goals: to eat/drink normal     Pain/Comfort:  · Pain Rating 1: 0/10    Respiratory Status: room air     Objective:     Oral Musculature Evaluation:   Oral Musculature: WFL   Dentition: gums in poor condition   Voice Prior to PO Intake: adequate    PO trials of thin liquids with no overt signs/sx of aspiration.   PO trials of soft and bite sized solids with no overt signs/sx of aspiration.     Assessment:     Pt continues to present with dysphagia requiring diet modification to reduce the risk of aspiration. Pt previously on minced and moist diet with mildly thick liquids. Pt able to tolerate PO trials of thin liquids and soft/bite sized with no overt signs/sx of aspiration; therefore, SLP upgraded diet for least restrictive intake. SLP educated both pt and mother on continued utilization of compensatory meal strategies to reduce the risk of aspiration during meals.     Goals:   Multidisciplinary Problems     SLP Goals        Problem: SLP    Goal Priority Disciplines Outcome   SLP Goal     SLP Ongoing, Progressing   Description: LTG: Pt will tolerate least restrictive PO diet with no clinical signs/sx aspiration    STGs:  Pt will tolerate easy to chew solids with improved bolus formation/transport  Pt will complete laryngeal strengthening exercises and ryan maneuver  with minimal-moderate cues.  Pt will tolerate thermal stimulation to the anterior faucial pillars with 100% effortful swallows and delay less than 2 seconds.                    Plan:     Will continue to follow and tx as appropriate.    Patient to be seen:  5 x/week   Plan of Care expires:  07/27/22  Plan of Care reviewed with:  patient, mother   SLP Follow-Up:  Yes       Time Tracking:     SLP Treatment Date:   07/06/22  Speech Start Time:  0930  Speech Stop Time:  0945     Speech Total Time (min):  15 min    Billable minutes:  Treatment of Swallow Dysfunction, 15 minutes       07/06/2022

## 2022-07-06 NOTE — PT/OT/SLP RE-EVAL
Physical Therapy Re-evaluation    Patient Name:  Nilo Claros Jr.   MRN:  [id number]    Recommendations:     Discharge Recommendations:  rehabilitation facility   Discharge Equipment Recommendations: walker, rolling   Barriers to discharge: Decreased caregiver support    Assessment:     Nilo Claros Jr. is a 36 y.o. male admitted with a medical diagnosis of Closed comminuted intertrochanteric fracture of left femur.  He presents with the following impairments/functional limitations:  weakness, impaired self care skills, impaired functional mobilty, impaired balance, gait instability, decreased safety awareness, pain. Slowly progressing with PT. Limited by pain. Agree with rehab placement as mom is having difficulty caring for patient.     Rehab Prognosis:  good; patient would benefit from acute skilled PT services to address these deficits and reach maximum level of function.      Recent Surgery: Procedure(s) (LRB):  INSERTION, INTRAMEDULLARY AMIRAH, FEMUR (Left) 8 Days Post-Op    Plan:     During this hospitalization, patient to be seen BID to address the above listed problems via gait training, therapeutic activities, therapeutic exercises, neuromuscular re-education  · Plan of Care Expires:  07/28/22   Plan of Care Reviewed with: patient, mother    Subjective     Communicated with nurse prior to session.  Patient found up in chair with telemetry upon PT entry to room, agreeable to evaluation.      Chief Complaint: pain  Patient comments/goals: caring for patient at home  Pain/Comfort:  · Pain Rating 1: 8/10  · Location - Side 1: Left  · Location 1: hip  · Pain Addressed 1: Distraction, Reposition, Cessation of Activity  · Pain Rating Post-Intervention 1: 8/10    Patients cultural, spiritual, Baptism conflicts given the current situation: no      Objective:     Patient found with: telemetry     General Precautions: Standard, aspiration   Orthopedic Precautions:LLE weight bearing as tolerated   Braces:  N/A  Respiratory Status: Room air    Exams:  · Cognitive Exam:  Patient is oriented to Person, Place, Time and Situation  · Sensation:    · -       Intact  · RLE ROM: WFL  · RLE Strength: 4/5 grossly  · LLE ROM: Limited by pain  · LLE Strength: Unable to accurately assess 2/2 pain    Functional Mobility:  · Bed Mobility:     · Sit to Supine: minimum assistance  · Transfers:     · Sit to Stand:  contact guard assistance with rolling walker  · Gait: 35 ft with RW & CGA  · Balance: Poor    AM-PAC 6 CLICK MOBILITY  Total Score:17     Patient left supine with all lines intact, call button in reach and mother present.    GOALS:   Multidisciplinary Problems     Physical Therapy Goals        Problem: Physical Therapy    Goal Priority Disciplines Outcome Goal Variances Interventions   Physical Therapy Goal     PT, PT/OT Ongoing, Progressing     Description: Goals to be met by: 22     Patient will increase functional independence with mobility by performin. Supine to sit with MInimal Assistance  2. Sit to supine with MInimal Assistance  3. Sit to stand transfer with Minimal Assistance  4. Bed to chair transfer with Minimal Assistance using Rolling Walker  5. Gait  x 50 feet with Minimal Assistance using Rolling Walker.                      History:     No past medical history on file.    Past Surgical History:   Procedure Laterality Date    INTRAMEDULLARY RODDING OF FEMUR Left 2022    Procedure: INSERTION, INTRAMEDULLARY AMIRAH, FEMUR;  Surgeon: Chalino Melgar DO;  Location: [facility];  Service: Orthopedics;  Laterality: Left;       Time Tracking:     PT Received On: 22  PT Start Time: 1030     PT Stop Time: 1048  PT Total Time (min): 18 min     Billable Minutes: Re-eval 18 minutes      2022

## 2022-07-06 NOTE — PROGRESS NOTES
[facility]  Hospital Medicine Progress Note        Chief Complaint: Inpatient Follow-up for hip fracture    HPI:   36 year old male with pmhx of childhood brain tumor s/p excision with chornic developmental delay, anxiety, and hypothyroidism presents to the ED after a fall at home.  Mother at bedside providing history as patient unable to participate in conversation.  Mother reports multiple falls lately and she's having greater difficulty caring for him.  States he tripped and fell last night and she was unable to get him off the floor.  EMS was called and he was transported ot the hospital.  XRs revealed L intertrochanteric femur fracture.  Vitals and labs stable and at baseline.  He does not take any blood thinners.  Compliant with home med regimen.  Orthopedics was consulted and the patient was admitted to the hospitalist group. Taken to the OR on 6/28 for repair and returned to the floor in stable condition. He worked with PT and OT.  Case mgmt consulted for rehab placement.    Interval Hx:  No changes overnight.  Patient has been accepted to rehab per  note, just waiting on insurance approval.  Working well with therapy.  No new complaints.     Objective/physical exam:  General: In no acute distress, afebrile  Chest: Clear to auscultation bilaterally  Heart: RRR, +S1, S2, no appreciable murmur  Abdomen: Soft, nontender, BS +  MSK: Warm, no lower extremity edema, no clubbing or cyanosis  Neurologic: Alert and oriented x4, Cranial nerve II-XII intact, Strength 5/5 in all 4 extremities    VITAL SIGNS: 24 HRS MIN & MAX LAST   Temp  Min: 97.9 °F (36.6 °C)  Max: 98.2 °F (36.8 °C) 98.2 °F (36.8 °C)   BP  Min: 96/68  Max: 119/80 119/80   Pulse  Min: 100  Max: 111  109   Resp  Min: 16  Max: 18 16   SpO2  Min: 97 %  Max: 99 % 97 %       Recent Labs   Lab 06/30/22  0426 07/01/22  0516 07/02/22  0617   WBC 11.5 7.1 6.5   RBC 3.22* 3.10* 3.25*   HGB 8.8* 8.5* 8.9*   HCT 27.2* 25.2* 26.5*    MCV 84.5 81.3 81.5   MCH 27.3 27.4 27.4   MCHC 32.4* 33.7 33.6   RDW 15.9 15.8 15.6    268 275   MPV 9.7 9.5 9.2       Recent Labs   Lab 06/30/22  0427 07/01/22  0516 07/02/22  0617    137 137   K 4.1 3.9 4.0   CO2 29 30* 29   BUN 12.6 9.1 8.0*   CREATININE 0.73 0.68* 0.66*   CALCIUM 8.5 8.7 8.9   ALBUMIN 3.2* 3.2*  --    ALKPHOS 97 108  --    ALT 32 31  --    AST 33 33  --    BILITOT 0.8 1.0  --           Microbiology Results (last 7 days)     ** No results found for the last 168 hours. **           See below for Radiology    Scheduled Med:   carvediloL  3.125 mg Oral BID    docusate sodium  100 mg Oral Daily    enoxaparin  40 mg Subcutaneous Daily    hydrOXYzine HCL  25 mg Oral Daily    levothyroxine  50 mcg Oral Daily    methocarbamoL  500 mg Oral TID    QUEtiapine  200 mg Oral QHS        Continuous Infusions:       PRN Meds:  sodium chloride, acetaminophen, acetaminophen, ALPRAZolam, aluminum-magnesium hydroxide-simethicone, bisacodyL, bisacodyL, ceFAZolin 2 g/50mL Dextrose IVPB, diphenhydrAMINE, famotidine, HYDROmorphone, magnesium hydroxide 400 mg/5 ml, metoprolol, morphine, morphine, ondansetron, ondansetron, ondansetron, ondansetron, oxyCODONE-acetaminophen, oxyCODONE-acetaminophen, polyethylene glycol, prochlorperazine, senna, senna-docusate 8.6-50 mg, sodium chloride 0.9%, trazodone       Assessment/Plan:   Left intertrochanteric femur fracture, displaced   Status post intramedullary nailing June 28, 2022   Fall at home   Essential Hypertension   Hypothyroidism   H/o developmental delay     Continue PT/OT  Awaiting rehab placement  Medically stable for discharge  Prescriptions printed and on the chart    Patient condition:  Stable    Anticipated discharge and Disposition: rehab once insurance approves      All diagnosis and differential diagnosis have been reviewed; assessment and plan has been documented; I have personally reviewed the labs and test results that are presently  available; I have reviewed the patients medication list; I have reviewed the consulting providers response and recommendations. I have reviewed or attempted to review medical records based upon their availability    All of the patient's questions have been  addressed and answered. Patient's is agreeable to the above stated plan. I will continue to monitor closely and make adjustments to medical management as needed.  _____________________________________________________________________      Radiology:  Fl Modified Barium Swallow Speech  See Speech Therapist Notes    This procedure was auto-finalized.      Clemente Patel MD   07/06/2022

## 2022-07-06 NOTE — PHYSICIAN QUERY
PT Name: Nilo Claros Jr.  MR #: [id number]    DOCUMENTATION CLARIFICATION      CDS/: Delores Christian RN BSN             Contact information:[email or web address]    This form is a permanent document in the medical record.      Query Date: July 6, 2022    By submitting this query, we are merely seeking further clarification of documentation. Please utilize your independent clinical judgment when addressing the question(s) below.    The Medical Record contains the following:   Indicators  Supporting Clinical Findings Location in Medical Record   x Anemia documented Critical care diagnosis: critical anemia requiring blood transfusion 6/29/22 progress note   x H&H H&H: 6/28 10.4 33.5   6/29 6.2 19.2   6/30 8.8 27.2    7/1 8.5 25.2   7/2 8.9 26.5 6/28-7/2/22 Lab    BP                    HR      GI bleeding documented      Acute bleeding (Non GI site)     x Transfusion(s) 2u PRBC 6/29/22   x Acute/Chronic illness Lt Intertrochanteric Fracture, Displaced 6/28/22 H&P    Treatments     x Other Procedure:  Treatment left  intertrochanteric femur fracture with intramedullary nail    EBL 300ml  6/28/22 Op report      6/28/22 Anesthesia report     Provider, please specify diagnosis or diagnoses associated with above clinical findings.   [   x] Acute blood loss anemia    [   ] Acute blood loss anemia expected post-operatively    [   ] Precipitous drop in Hematocrit   [   ] Anemia, unspecified    [   ] Other Hematological Diagnosis (please specify): _________________   [   ] Clinically Undetermined            Please document in your progress notes daily for the duration of treatment, until resolved, and include in your discharge summary.    Form No. [id number]

## 2022-07-07 PROBLEM — Z00.8 ENCOUNTER FOR PSYCHOLOGICAL EVALUATION: Status: ACTIVE | Noted: 2022-07-07

## 2022-07-07 LAB
ANION GAP SERPL CALC-SCNC: 11 MEQ/L
BASOPHILS # BLD AUTO: 0.09 X10(3)/MCL (ref 0–0.2)
BASOPHILS NFR BLD AUTO: 1.5 %
BUN SERPL-MCNC: 7.8 MG/DL (ref 8.9–20.6)
CALCIUM SERPL-MCNC: 8.9 MG/DL (ref 8.4–10.2)
CHLORIDE SERPL-SCNC: 101 MMOL/L (ref 98–107)
CO2 SERPL-SCNC: 24 MMOL/L (ref 22–29)
CREAT SERPL-MCNC: 0.72 MG/DL (ref 0.73–1.18)
CREAT/UREA NIT SERPL: 11
EOSINOPHIL # BLD AUTO: 0.32 X10(3)/MCL (ref 0–0.9)
EOSINOPHIL NFR BLD AUTO: 5.2 %
ERYTHROCYTE [DISTWIDTH] IN BLOOD BY AUTOMATED COUNT: 16.6 % (ref 11.5–17)
GLUCOSE SERPL-MCNC: 105 MG/DL (ref 74–100)
HCT VFR BLD AUTO: 31.2 % (ref 42–52)
HGB BLD-MCNC: 9.3 GM/DL (ref 14–18)
IMM GRANULOCYTES # BLD AUTO: 0.17 X10(3)/MCL (ref 0–0.04)
IMM GRANULOCYTES NFR BLD AUTO: 2.8 %
LYMPHOCYTES # BLD AUTO: 1.39 X10(3)/MCL (ref 0.6–4.6)
LYMPHOCYTES NFR BLD AUTO: 22.6 %
MCH RBC QN AUTO: 27 PG (ref 27–31)
MCHC RBC AUTO-ENTMCNC: 29.8 MG/DL (ref 33–36)
MCV RBC AUTO: 90.4 FL (ref 80–94)
MONOCYTES # BLD AUTO: 0.52 X10(3)/MCL (ref 0.1–1.3)
MONOCYTES NFR BLD AUTO: 8.5 %
NEUTROPHILS # BLD AUTO: 3.7 X10(3)/MCL (ref 2.1–9.2)
NEUTROPHILS NFR BLD AUTO: 59.4 %
NRBC BLD AUTO-RTO: 0 %
PLATELET # BLD AUTO: 261 X10(3)/MCL (ref 130–400)
PMV BLD AUTO: 8.7 FL (ref 7.4–10.4)
POCT GLUCOSE: 128 MG/DL (ref 70–110)
POTASSIUM SERPL-SCNC: 4.1 MMOL/L (ref 3.5–5.1)
RBC # BLD AUTO: 3.45 X10(6)/MCL (ref 4.7–6.1)
SODIUM SERPL-SCNC: 136 MMOL/L (ref 136–145)
WBC # SPEC AUTO: 6.1 X10(3)/MCL (ref 4.5–11.5)

## 2022-07-07 PROCEDURE — 63600175 PHARM REV CODE 636 W HCPCS: Performed by: PHYSICIAN ASSISTANT

## 2022-07-07 PROCEDURE — 25000003 PHARM REV CODE 250: Performed by: HOSPITALIST

## 2022-07-07 PROCEDURE — 97530 THERAPEUTIC ACTIVITIES: CPT | Mod: CQ

## 2022-07-07 PROCEDURE — 80048 BASIC METABOLIC PNL TOTAL CA: CPT | Performed by: HOSPITALIST

## 2022-07-07 PROCEDURE — 25000003 PHARM REV CODE 250: Performed by: PHYSICIAN ASSISTANT

## 2022-07-07 PROCEDURE — 36415 COLL VENOUS BLD VENIPUNCTURE: CPT | Performed by: HOSPITALIST

## 2022-07-07 PROCEDURE — 11000001 HC ACUTE MED/SURG PRIVATE ROOM

## 2022-07-07 PROCEDURE — 97116 GAIT TRAINING THERAPY: CPT | Mod: CQ

## 2022-07-07 PROCEDURE — 85025 COMPLETE CBC W/AUTO DIFF WBC: CPT | Performed by: HOSPITALIST

## 2022-07-07 RX ADMIN — METHOCARBAMOL 500 MG: 500 TABLET ORAL at 02:07

## 2022-07-07 RX ADMIN — OXYCODONE AND ACETAMINOPHEN 1 TABLET: 10; 325 TABLET ORAL at 05:07

## 2022-07-07 RX ADMIN — DOCOSANOL 10 % TOPICAL CREAM: at 02:07

## 2022-07-07 RX ADMIN — QUETIAPINE 200 MG: 100 TABLET ORAL at 09:07

## 2022-07-07 RX ADMIN — OXYCODONE AND ACETAMINOPHEN 1 TABLET: 10; 325 TABLET ORAL at 02:07

## 2022-07-07 RX ADMIN — LEVOTHYROXINE SODIUM 50 MCG: 25 TABLET ORAL at 08:07

## 2022-07-07 RX ADMIN — CARVEDILOL 3.12 MG: 3.12 TABLET, FILM COATED ORAL at 08:07

## 2022-07-07 RX ADMIN — DOCOSANOL 10 % TOPICAL CREAM: at 05:07

## 2022-07-07 RX ADMIN — METHOCARBAMOL 500 MG: 500 TABLET ORAL at 08:07

## 2022-07-07 RX ADMIN — DOCOSANOL 10 % TOPICAL CREAM: at 10:07

## 2022-07-07 RX ADMIN — CARVEDILOL 3.12 MG: 3.12 TABLET, FILM COATED ORAL at 09:07

## 2022-07-07 RX ADMIN — ENOXAPARIN SODIUM 40 MG: 100 INJECTION SUBCUTANEOUS at 05:07

## 2022-07-07 RX ADMIN — METHOCARBAMOL 500 MG: 500 TABLET ORAL at 09:07

## 2022-07-07 RX ADMIN — DOCUSATE SODIUM 100 MG: 100 CAPSULE, LIQUID FILLED ORAL at 08:07

## 2022-07-07 RX ADMIN — HYDROXYZINE HYDROCHLORIDE 25 MG: 25 TABLET ORAL at 08:07

## 2022-07-07 RX ADMIN — OXYCODONE AND ACETAMINOPHEN 1 TABLET: 10; 325 TABLET ORAL at 09:07

## 2022-07-07 RX ADMIN — DIPHENHYDRAMINE HYDROCHLORIDE 25 MG: 25 CAPSULE ORAL at 09:07

## 2022-07-07 RX ADMIN — DIPHENHYDRAMINE HYDROCHLORIDE 25 MG: 25 CAPSULE ORAL at 05:07

## 2022-07-07 RX ADMIN — DOCOSANOL 10 % TOPICAL CREAM: at 09:07

## 2022-07-07 RX ADMIN — OXYCODONE AND ACETAMINOPHEN 1 TABLET: 10; 325 TABLET ORAL at 10:07

## 2022-07-07 NOTE — CONSULTS
"[facility]  Psychiatry  Consult Note    Patient Name: Nilo Claros Jr.  MRN: [id number]   Code Status: Full Code  Admission Date: 6/28/2022  Hospital Length of Stay: 9 days  Attending Physician: Clemente Patel MD  Primary Care Provider: Primary Doctor No    Current Legal Status: Uncontested    Patient information was obtained from patient, parent, ER records and primary team.   Inpatient consult to Psychiatry  Consult performed by: PARRISH Armas-C  Consult ordered by: Clemente Patel MD  Assessment/Recommendations: Psychiatry Recommendations:  1.  Mr. Corcoran is a very pleasant young man without any mental health concerns at this times.  He does have some intellectual issues related to his past hx of a brain tumor in early childhood.  2.  Re-consult psych if needed.        Subjective:     Principal Problem:Closed comminuted intertrochanteric fracture of left femur    Chief Complaint:  "I am doing better; I just have to get my hip better."     HPI: Nilo is 37 year old white male consulted for SNF placement.  Nilo says that he has been doing well, other than not being able to walk to well due to him falling and breaking his hip.  He denies a hx of mental illness or mental health hospitalizations.  He denies depression and anxiety.  He denies AVH and delusional thinking.  He denies anger and irritability.  He denies illicit drug use and ETOH use.  He reports a good appetite and states that he sleeps well.    I his mom, Ms. Matthews and she confirmed the hx that Nilo provided to me.      Hospital Course: No notes on file         Patient History               Medical as of 7/7/2022       Past Medical History       Diagnosis Date Comments Source    Encephalomalacia -- -- Provider    H/O brain tumor -- -- Provider                          Surgical as of 7/7/2022       Past Surgical History       Procedure Laterality Date Comments Source    INTRAMEDULLARY RODDING " OF FEMUR Left 6/28/2022 Procedure: INSERTION, INTRAMEDULLARY AMIRAH, FEMUR;  Surgeon: Chalino Melgar DO;  Location: [facility] OR;  Service: Orthopedics;  Laterality: Left; Provider                          Family as of 7/7/2022    None               Tobacco Use as of 7/7/2022       Smoking Status Smoking Start Date Smoking Quit Date Packs/Day Years Used    Never Assessed -- -- -- --      Types Comments Smokeless Tobacco Status Smokeless Tobacco Quit Date Source     Chew -- Current User -- Provider                  Alcohol Use as of 7/7/2022    None               Drug Use as of 7/7/2022    None               Sexual Activity as of 7/7/2022    None               Activities of Daily Living as of 7/7/2022    None               Social Documentation as of 7/7/2022    None               Occupational as of 7/7/2022    None               Socioeconomic as of 7/7/2022       Marital Status Spouse Name Number of Children Years Education Education Level Preferred Language Ethnicity Race Source    Unknown -- -- -- -- English Not  or /a White --                  Pertinent History       Question Response Comments    Lives with family --    Place in Birth Order -- --    Lives in home --    Number of Siblings -- --    Raised by -- --    Legal Involvement none --    Childhood Trauma uneventful --    Criminal History of none --    Financial Status disabled --    Highest Level of Education unfinished highschool He reports that he had an IEP.    Does patient have access to a firearm? -- --     Service -- --    Primary Leisure Activity -- --    Spirituality -- --          Past Medical History:   Diagnosis Date    Encephalomalacia     H/O brain tumor      Past Surgical History:   Procedure Laterality Date    INTRAMEDULLARY RODDING OF FEMUR Left 6/28/2022    Procedure: INSERTION, INTRAMEDULLARY AMIRAH, FEMUR;  Surgeon: Chalino Melgar DO;  Location: [facility] OR;  Service: Orthopedics;  Laterality: Left;     Family History    None        Tobacco Use    Smoking status: Not on file    Smokeless tobacco: Current User     Types: Chew   Substance and Sexual Activity    Alcohol use: Not on file    Drug use: Not on file    Sexual activity: Not on file     Review of patient's allergies indicates:   Allergen Reactions    Toradol [ketorolac]        No current facility-administered medications on file prior to encounter.     Current Outpatient Medications on File Prior to Encounter   Medication Sig    hydrOXYzine HCL (ATARAX) 25 MG tablet Take 25 mg by mouth once daily.    levothyroxine (SYNTHROID) 50 MCG tablet Take 50 mcg by mouth once daily.    ofloxacin (FLOXIN) 0.3 % otic solution once daily. Med bottle did not specify how many gtts or how often. Could not obtain from family at the time    QUEtiapine (SEROQUEL) 200 MG Tab Take 200 mg by mouth once daily. At bedtime    [DISCONTINUED] busPIRone (BUSPAR) 5 MG Tab Take 5 mg by mouth once. At bedtime    [DISCONTINUED] metoprolol tartrate (LOPRESSOR) 50 MG tablet Take 50 mg by mouth once. At bedtime     Psychotherapeutics (From admission, onward)                Start     Stop Route Frequency Ordered    06/30/22 2100  QUEtiapine tablet 200 mg        Question:  Is the patient competent?  Answer:  Yes    -- Oral Nightly 06/30/22 0147    06/30/22 0841  ALPRAZolam tablet 0.5 mg         -- Oral 3 times daily PRN 06/30/22 0741    06/28/22 0249  trazodone split tablet 25 mg         -- Oral Nightly PRN 06/28/22 0153          Review of Systems   Constitutional:  Positive for activity change.   HENT: Negative.     Gastrointestinal: Negative.    Endocrine: Negative.    Genitourinary: Negative.    Musculoskeletal:  Positive for gait problem.        Hip pain.   Skin:  Positive for wound (surgical).   Strengths and Liabilities: Strength: Patient is expressive/articulate., Strength: Patient has positive support network., Liability: Patient has intellectual deficits.    Objective:     Vital Signs (Most  "Recent):  Temp: 97.8 °F (36.6 °C) (07/07/22 1524)  Pulse: 104 (07/07/22 1524)  Resp: 18 (07/07/22 1524)  BP: 128/83 (07/07/22 1524)  SpO2: 98 % (07/07/22 1524) Vital Signs (24h Range):  Temp:  [97.3 °F (36.3 °C)-98.1 °F (36.7 °C)] 97.8 °F (36.6 °C)  Pulse:  [104-111] 104  Resp:  [10-20] 18  SpO2:  [96 %-100 %] 98 %  BP: (106-128)/(68-83) 128/83     Height: 5' 10" (177.8 cm)  Weight: 66.2 kg (145 lb 15.1 oz)  Body mass index is 20.94 kg/m².      Intake/Output Summary (Last 24 hours) at 7/7/2022 1610  Last data filed at 7/7/2022 1411  Gross per 24 hour   Intake 340 ml   Output 530 ml   Net -190 ml       Physical Exam  Vitals and nursing note reviewed.   Neurological:      Mental Status: He is alert and oriented to person, place, and time.   Psychiatric:         Attention and Perception: Attention and perception normal.         Mood and Affect: Mood and affect normal.         Speech: Speech normal.         Behavior: Behavior normal. Behavior is cooperative.         Thought Content: Thought content normal. Thought content does not include homicidal or suicidal ideation.         Cognition and Memory: Cognition and memory normal.         Judgment: Judgment normal.      Comments: Very pleasant gentleman.     NEUROLOGICAL EXAMINATION:     MENTAL STATUS   Oriented to person, place, and time.   Registration: recalls 3 of 3 objects. Recall at 5 minutes: recalls 3 of 3 objects.   Attention: normal. Concentration: normal.   Speech: speech is normal   Level of consciousness: alert  Knowledge: consistent with education. Able to perform simple calculations.   Able to name object. Normal comprehension.   Significant Labs: Last 72 Hours:   Recent Lab Results         07/07/22  0750   07/06/22  1518   07/05/22  1118        Anion Gap 11.0           Baso # 0.09           Basophil % 1.5           BUN 7.8           BUN/CREAT RATIO 11           Calcium 8.9           Chloride 101           CO2 24           Creatinine 0.72           eGFR if " non  >60           Eos # 0.32           Eosinophil % 5.2           Glucose 105           Hematocrit 31.2           Hemoglobin 9.3           Immature Grans (Abs) 0.17           Immature Granulocytes 2.8           Lymph # 1.39           LYMPH % 22.6           MCH 27.0           MCHC 29.8           MCV 90.4           Mono # 0.52           Mono % 8.5           MPV 8.7           Neut # 3.7           Neut % 59.4           nRBC 0.0           Platelets 261           POCT Glucose   110   98       Potassium 4.1           RBC 3.45           RDW 16.6           Sodium 136           WBC 6.1                   Significant Imaging: I have reviewed all pertinent imaging results/findings within the past 24 hours.    Assessment/Plan:     Encounter for psychological evaluation  Psychiatry Recommendations:  1.  Mr. Corcoran is a very pleasant young man without any mental health concerns at this times.  He does have some intellectual issues related to his past hx of a brain tumor in early childhood.  2.  Re-consult psych if needed.         Total Time:  45 minutes     Christie Barber, PARRISH-C   Psychiatry  [facility]

## 2022-07-07 NOTE — PLAN OF CARE
07/07/22 1536   Discharge Reassessment   Assessment Type Discharge Planning Reassessment   Did the patient's condition or plan change since previous assessment? No   Discharge Plan discussed with: Patient;Sibling   Communicated RODRI with patient/caregiver Yes   Discharge Plan A Other  (SWING)   Discharge Plan B Skilled Nursing Facility   DME Needed Upon Discharge  none   Discharge Barriers Identified None   Why the patient remains in the hospital Insurance issues  (Insurance has denied acute rehab near home)   Post-Acute Status   Post-Acute Authorization Placement   Post-Acute Placement Status Referrals Sent  (FOC obtained for any facilty in this area as his sister lives here)

## 2022-07-07 NOTE — PLAN OF CARE
Sent snf referral to updated locations near [facility]'s insurance network. Awaiting response.

## 2022-07-07 NOTE — PT/OT/SLP DISCHARGE
Speech Language Pathology Discharge Summary    Nilo Claros Jr.  MRN: [id number]   Closed comminuted intertrochanteric fracture of left femur     Date of Last Treatment Session: 7/6/22    Past Medical History:   Diagnosis Date    Encephalomalacia     H/O brain tumor        Status at initiation of therapy: overt signs/sx of aspiration during PO intake.     Treatment Area(s):  Swallow    Goals:   Multidisciplinary Problems     SLP Goals        Problem: SLP    Goal Priority Disciplines Outcome   SLP Goal     SLP Ongoing, Progressing   Description: LTG: Pt will tolerate least restrictive PO diet with no clinical signs/sx aspiration    STGs:  Pt will tolerate easy to chew solids with improved bolus formation/transport  Pt will complete laryngeal strengthening exercises and ryan maneuver with minimal-moderate cues.  Pt will tolerate thermal stimulation to the anterior faucial pillars with 100% effortful swallows and delay less than 2 seconds.                    Participation in Treatment (at discharge):  Cooperative    Functional Status at time of Discharge:    Swallow: Patient demonstrates no dysphagia.                     Clinical Bedside assessment was completed on 6/29/22                       Instrumental assessment was completed on 6/29/22                                Swallowing Guidelines: Patient tolerating  thin liquids and soft and bite sized                Pt presents with functional oropharyngeal swallow fx and remains on LRD. Pt educated on compensatory meal strategies to reduce the risk of aspiration. No further skilled intervention warranted. Please reconsult as needed.

## 2022-07-07 NOTE — PROGRESS NOTES
[facility] Hospital Medicine Progress Note        Chief Complaint: Inpatient Follow-up for hip fracture     HPI:   36 year old male with pmhx of childhood brain tumor s/p excision with chornic developmental delay, anxiety, and hypothyroidism presents to the ED after a fall at home.  Mother at bedside providing history as patient unable to participate in conversation.  Mother reports multiple falls lately and she's having greater difficulty caring for him.  States he tripped and fell last night and she was unable to get him off the floor.  EMS was called and he was transported ot the hospital.  XRs revealed L intertrochanteric femur fracture.  Vitals and labs stable and at baseline.  He does not take any blood thinners.  Compliant with home med regimen.  Orthopedics was consulted and the patient was admitted to the hospitalist group. Taken to the OR on 6/28 for repair and returned to the floor in stable condition. He worked with PT and OT.  Case mgmt consulted for rehab placement.     Interval Hx:  No changes or complaints.  No family at bedside.  Awaiting insurance approval for rehab placement.  Otherwise doing well and working with therapy.  Bowels are moving.  Pain controlled     Objective/physical exam:  General: In no acute distress, afebrile  Chest: Clear to auscultation bilaterally  Heart: RRR, +S1, S2, no appreciable murmur  Abdomen: Soft, nontender, BS +  MSK: Warm, no lower extremity edema, no clubbing or cyanosis  Neurologic: Alert and oriented x4, Cranial nerve II-XII intact, Strength 5/5 in all 4 extremities    VITAL SIGNS: 24 HRS MIN & MAX LAST   Temp  Min: 97.3 °F (36.3 °C)  Max: 98.8 °F (37.1 °C) 98 °F (36.7 °C)   BP  Min: 105/68  Max: 122/83 122/83   Pulse  Min: 107  Max: 118  108   Resp  Min: 10  Max: 20 18   SpO2  Min: 93 %  Max: 100 % 97 %       Recent Labs   Lab 07/01/22  0516 07/02/22  0617   WBC 7.1 6.5   RBC 3.10* 3.25*   HGB 8.5* 8.9*   HCT 25.2* 26.5*   MCV  81.3 81.5   MCH 27.4 27.4   MCHC 33.7 33.6   RDW 15.8 15.6    275   MPV 9.5 9.2       Recent Labs   Lab 07/01/22  0516 07/02/22  0617    137   K 3.9 4.0   CO2 30* 29   BUN 9.1 8.0*   CREATININE 0.68* 0.66*   CALCIUM 8.7 8.9   ALBUMIN 3.2*  --    ALKPHOS 108  --    ALT 31  --    AST 33  --    BILITOT 1.0  --           Microbiology Results (last 7 days)     ** No results found for the last 168 hours. **           See below for Radiology    Scheduled Med:   carvediloL  3.125 mg Oral BID    docosanoL   Topical (Top) 5x Daily    docusate sodium  100 mg Oral Daily    enoxaparin  40 mg Subcutaneous Daily    hydrOXYzine HCL  25 mg Oral Daily    levothyroxine  50 mcg Oral Daily    methocarbamoL  500 mg Oral TID    QUEtiapine  200 mg Oral QHS        Continuous Infusions:       PRN Meds:  sodium chloride, acetaminophen, acetaminophen, ALPRAZolam, aluminum-magnesium hydroxide-simethicone, bisacodyL, bisacodyL, ceFAZolin 2 g/50mL Dextrose IVPB, diphenhydrAMINE, famotidine, HYDROmorphone, magnesium hydroxide 400 mg/5 ml, metoprolol, morphine, morphine, ondansetron, ondansetron, ondansetron, ondansetron, oxyCODONE-acetaminophen, oxyCODONE-acetaminophen, polyethylene glycol, prochlorperazine, senna, senna-docusate 8.6-50 mg, sodium chloride 0.9%, trazodone       Assessment/Plan:   Left intertrochanteric femur fracture, displaced   Status post intramedullary nailing June 28, 2022   Fall at home   Essential Hypertension   Hypothyroidism   H/o mild developmental delay      Continue PT/OT  Awaiting rehab placement  Medically stable for discharge  Checking set of labs this morning  Prescriptions printed and on the chart    Patient condition:  Stable    Anticipated discharge and Disposition: TBD      All diagnosis and differential diagnosis have been reviewed; assessment and plan has been documented; I have personally reviewed the labs and test results that are presently available; I have reviewed the patients  medication list; I have reviewed the consulting providers response and recommendations. I have reviewed or attempted to review medical records based upon their availability    All of the patient's questions have been  addressed and answered. Patient's is agreeable to the above stated plan. I will continue to monitor closely and make adjustments to medical management as needed.  _____________________________________________________________________      Radiology:  Fl Modified Barium Swallow Speech  See Speech Therapist Notes    This procedure was auto-finalized.      Clemente Patel MD   07/07/2022

## 2022-07-07 NOTE — NURSING
Pt refused AM labs. Educated on importance of lab work to plan of care, continues to refuse at this time.

## 2022-07-07 NOTE — HPI
Nilo is 37 year old white male consulted for SNF placement.  Nilo says that he has been doing well, other than not being able to walk to well due to him falling and breaking his hip.  He denies a hx of mental illness or mental health hospitalizations.  He denies depression and anxiety.  He denies AVH and delusional thinking.  He denies anger and irritability.  He denies illicit drug use and ETOH use.  He reports a good appetite and states that he sleeps well.    I his mom, Ms. Matthews and she confirmed the hx that Nilo provided to me.

## 2022-07-07 NOTE — SUBJECTIVE & OBJECTIVE
Patient History               Medical as of 7/7/2022       Past Medical History       Diagnosis Date Comments Source    Encephalomalacia -- -- Provider    H/O brain tumor -- -- Provider                          Surgical as of 7/7/2022       Past Surgical History       Procedure Laterality Date Comments Source    INTRAMEDULLARY RODDING OF FEMUR Left 6/28/2022 Procedure: INSERTION, INTRAMEDULLARY AMIRAH, FEMUR;  Surgeon: Chalino Melgar DO;  Location: [facility];  Service: Orthopedics;  Laterality: Left; Provider                          Family as of 7/7/2022    None               Tobacco Use as of 7/7/2022       Smoking Status Smoking Start Date Smoking Quit Date Packs/Day Years Used    Never Assessed -- -- -- --      Types Comments Smokeless Tobacco Status Smokeless Tobacco Quit Date Source     Chew -- Current User -- Provider                  Alcohol Use as of 7/7/2022    None               Drug Use as of 7/7/2022    None               Sexual Activity as of 7/7/2022    None               Activities of Daily Living as of 7/7/2022    None               Social Documentation as of 7/7/2022    None               Occupational as of 7/7/2022    None               Socioeconomic as of 7/7/2022       Marital Status Spouse Name Number of Children Years Education Education Level Preferred Language Ethnicity Race Source    Unknown -- -- -- -- English Not  or /a White --                  Pertinent History       Question Response Comments    Lives with family --    Place in Birth Order -- --    Lives in home --    Number of Siblings -- --    Raised by -- --    Legal Involvement none --    Childhood Trauma uneventful --    Criminal History of none --    Financial Status disabled --    Highest Level of Education unfinished highschool He reports that he had an IEP.    Does patient have access to a firearm? -- --     Service -- --    Primary Leisure Activity -- --    Spirituality -- --          Past Medical  History:   Diagnosis Date    Encephalomalacia     H/O brain tumor      Past Surgical History:   Procedure Laterality Date    INTRAMEDULLARY RODDING OF FEMUR Left 6/28/2022    Procedure: INSERTION, INTRAMEDULLARY AMIRAH, FEMUR;  Surgeon: Chalino Melgar DO;  Location: [facility];  Service: Orthopedics;  Laterality: Left;     Family History    None       Tobacco Use    Smoking status: Not on file    Smokeless tobacco: Current User     Types: Chew   Substance and Sexual Activity    Alcohol use: Not on file    Drug use: Not on file    Sexual activity: Not on file     Review of patient's allergies indicates:   Allergen Reactions    Toradol [ketorolac]        No current facility-administered medications on file prior to encounter.     Current Outpatient Medications on File Prior to Encounter   Medication Sig    hydrOXYzine HCL (ATARAX) 25 MG tablet Take 25 mg by mouth once daily.    levothyroxine (SYNTHROID) 50 MCG tablet Take 50 mcg by mouth once daily.    ofloxacin (FLOXIN) 0.3 % otic solution once daily. Med bottle did not specify how many gtts or how often. Could not obtain from family at the time    QUEtiapine (SEROQUEL) 200 MG Tab Take 200 mg by mouth once daily. At bedtime    [DISCONTINUED] busPIRone (BUSPAR) 5 MG Tab Take 5 mg by mouth once. At bedtime    [DISCONTINUED] metoprolol tartrate (LOPRESSOR) 50 MG tablet Take 50 mg by mouth once. At bedtime     Psychotherapeutics (From admission, onward)                Start     Stop Route Frequency Ordered    06/30/22 2100  QUEtiapine tablet 200 mg        Question:  Is the patient competent?  Answer:  Yes    -- Oral Nightly 06/30/22 0147    06/30/22 0841  ALPRAZolam tablet 0.5 mg         -- Oral 3 times daily PRN 06/30/22 0741    06/28/22 0249  trazodone split tablet 25 mg         -- Oral Nightly PRN 06/28/22 0153          Review of Systems   Constitutional:  Positive for activity change.   HENT: Negative.     Gastrointestinal: Negative.    Endocrine: Negative.   "  Genitourinary: Negative.    Musculoskeletal:  Positive for gait problem.        Hip pain.   Skin:  Positive for wound (surgical).   Strengths and Liabilities: Strength: Patient is expressive/articulate., Strength: Patient has positive support network., Liability: Patient has intellectual deficits.    Objective:     Vital Signs (Most Recent):  Temp: 97.8 °F (36.6 °C) (07/07/22 1524)  Pulse: 104 (07/07/22 1524)  Resp: 18 (07/07/22 1524)  BP: 128/83 (07/07/22 1524)  SpO2: 98 % (07/07/22 1524) Vital Signs (24h Range):  Temp:  [97.3 °F (36.3 °C)-98.1 °F (36.7 °C)] 97.8 °F (36.6 °C)  Pulse:  [104-111] 104  Resp:  [10-20] 18  SpO2:  [96 %-100 %] 98 %  BP: (106-128)/(68-83) 128/83     Height: 5' 10" (177.8 cm)  Weight: 66.2 kg (145 lb 15.1 oz)  Body mass index is 20.94 kg/m².      Intake/Output Summary (Last 24 hours) at 7/7/2022 1610  Last data filed at 7/7/2022 1411  Gross per 24 hour   Intake 340 ml   Output 530 ml   Net -190 ml       Physical Exam  Vitals and nursing note reviewed.   Neurological:      Mental Status: He is alert and oriented to person, place, and time.   Psychiatric:         Attention and Perception: Attention and perception normal.         Mood and Affect: Mood and affect normal.         Speech: Speech normal.         Behavior: Behavior normal. Behavior is cooperative.         Thought Content: Thought content normal. Thought content does not include homicidal or suicidal ideation.         Cognition and Memory: Cognition and memory normal.         Judgment: Judgment normal.      Comments: Very pleasant gentleman.     NEUROLOGICAL EXAMINATION:     MENTAL STATUS   Oriented to person, place, and time.   Registration: recalls 3 of 3 objects. Recall at 5 minutes: recalls 3 of 3 objects.   Attention: normal. Concentration: normal.   Speech: speech is normal   Level of consciousness: alert  Knowledge: consistent with education. Able to perform simple calculations.   Able to name object. Normal comprehension. "   Significant Labs: Last 72 Hours:   Recent Lab Results         07/07/22  0750   07/06/22  1518   07/05/22  1118        Anion Gap 11.0           Baso # 0.09           Basophil % 1.5           BUN 7.8           BUN/CREAT RATIO 11           Calcium 8.9           Chloride 101           CO2 24           Creatinine 0.72           eGFR if non  >60           Eos # 0.32           Eosinophil % 5.2           Glucose 105           Hematocrit 31.2           Hemoglobin 9.3           Immature Grans (Abs) 0.17           Immature Granulocytes 2.8           Lymph # 1.39           LYMPH % 22.6           MCH 27.0           MCHC 29.8           MCV 90.4           Mono # 0.52           Mono % 8.5           MPV 8.7           Neut # 3.7           Neut % 59.4           nRBC 0.0           Platelets 261           POCT Glucose   110   98       Potassium 4.1           RBC 3.45           RDW 16.6           Sodium 136           WBC 6.1                   Significant Imaging: I have reviewed all pertinent imaging results/findings within the past 24 hours.

## 2022-07-07 NOTE — PT/OT/SLP PROGRESS
SLP followed up with nursing regarding diet tolerance. Nursing reports no overt signs/sx of aspiration during intake. SLP to sign off. Please reconsult as needed.

## 2022-07-07 NOTE — PT/OT/SLP PROGRESS
Physical Therapy         Treatment        Nilo Claros Jr.   MRN: [id number]     PT Received On: 07/07/22  PT Start Time: 1053     PT Stop Time: 1116    PT Total Time (min): 23 min       Billable Minutes:  Gait [id number] and Therapeutic Activity 10  Total Minutes: 23    Treatment Type: Treatment  PT/PTA: PTA     PTA Visit Number: 1       General Precautions: Standard, fall  Orthopedic Precautions: Orthopedic Precautions : LLE weight bearing as tolerated   Braces:      Spiritual, Cultural Beliefs, Scientologist Practices, Values that Affect Care: no    Subjective:  Communicated with nurse prior to session.         Objective:  Patient found supine in bed, with Patient found with: telemetry    Functional Mobility:  Bed Mobility:   Supine to sit: Contact Guard Assistance   Sit to supine: Activity did not occur   Rolling: Contact Guard Assistance   Scooting: Contact Guard Assistance    Balance:   Static Sit: FAIR: Maintains without assist, but unable to take any challenges   Dynamic Sit:  FAIR: Cannot move trunk without losing balance  Static Stand: FAIR: Maintains without assist but unable to take challenges  Dynamic stand: FAIR: Needs CONTACT GUARD during gait    Transfer Training:  Sit to stand:Minimal Assistance with Rolling Walker x2 trials    Gait Training:  Patient gait trained   65  feet on level tile with Rolling Walker with Contact Guard Assistance.  Pt with demonstarting a  reciprocal gait with decreased kolton, decreased step length, decreased toe-to-floor clearance and decreased weight-shifting ability.Impairments contributing to gait deviations include pain and decreased strength. Pt required verbal and tactile cues to increase L hip flexion.       Activity Tolerance:  Patient tolerated treatment well    Patient left up in chair with all lines intact, call button in reach and chair alarm on.    Assessment:  Nilo Claros Jr. is a 37 y.o. male with a medical diagnosis of Closed comminuted  intertrochanteric fracture of left femur. He presents with increased endurace but continues to require frequent cues and encouragement to decrease compensatory strategies.    Rehab potential is good.    Activity tolerance: Good    Discharge recommendations: Discharge Facility/Level of Care Needs: rehabilitation facility     Equipment recommendations: Equipment Needed After Discharge: walker, rolling     GOALS:   Multidisciplinary Problems     Physical Therapy Goals        Problem: Physical Therapy    Goal Priority Disciplines Outcome Goal Variances Interventions   Physical Therapy Goal     PT, PT/OT Ongoing, Progressing     Description: Goals to be met by: 22     Patient will increase functional independence with mobility by performin. Supine to sit with MInimal Assistance  2. Sit to supine with MInimal Assistance  3. Sit to stand transfer with Minimal Assistance  4. Bed to chair transfer with Minimal Assistance using Rolling Walker  5. Gait  x 50 feet with Minimal Assistance using Rolling Walker.                      PLAN:    Patient to be seen BID  to address the above listed problems via gait training, therapeutic activities  Plan of Care expires: 22  Plan of Care reviewed with: patient         2022

## 2022-07-08 PROCEDURE — 97530 THERAPEUTIC ACTIVITIES: CPT | Mod: CQ

## 2022-07-08 PROCEDURE — 94799 UNLISTED PULMONARY SVC/PX: CPT

## 2022-07-08 PROCEDURE — 94761 N-INVAS EAR/PLS OXIMETRY MLT: CPT

## 2022-07-08 PROCEDURE — 11000001 HC ACUTE MED/SURG PRIVATE ROOM

## 2022-07-08 PROCEDURE — 25000003 PHARM REV CODE 250: Performed by: PHYSICIAN ASSISTANT

## 2022-07-08 PROCEDURE — 97116 GAIT TRAINING THERAPY: CPT | Mod: CQ

## 2022-07-08 PROCEDURE — 63600175 PHARM REV CODE 636 W HCPCS: Performed by: PHYSICIAN ASSISTANT

## 2022-07-08 PROCEDURE — 97168 OT RE-EVAL EST PLAN CARE: CPT

## 2022-07-08 PROCEDURE — 99900031 HC PATIENT EDUCATION (STAT)

## 2022-07-08 PROCEDURE — 25000003 PHARM REV CODE 250: Performed by: HOSPITALIST

## 2022-07-08 RX ADMIN — OXYCODONE AND ACETAMINOPHEN 1 TABLET: 10; 325 TABLET ORAL at 01:07

## 2022-07-08 RX ADMIN — LEVOTHYROXINE SODIUM 50 MCG: 25 TABLET ORAL at 08:07

## 2022-07-08 RX ADMIN — HYDROXYZINE HYDROCHLORIDE 25 MG: 25 TABLET ORAL at 08:07

## 2022-07-08 RX ADMIN — DOCOSANOL 10 % TOPICAL CREAM: at 11:07

## 2022-07-08 RX ADMIN — METHOCARBAMOL 500 MG: 500 TABLET ORAL at 08:07

## 2022-07-08 RX ADMIN — QUETIAPINE 200 MG: 100 TABLET ORAL at 08:07

## 2022-07-08 RX ADMIN — DOCOSANOL 10 % TOPICAL CREAM: at 05:07

## 2022-07-08 RX ADMIN — DOCOSANOL 10 % TOPICAL CREAM: at 09:07

## 2022-07-08 RX ADMIN — ENOXAPARIN SODIUM 40 MG: 100 INJECTION SUBCUTANEOUS at 04:07

## 2022-07-08 RX ADMIN — OXYCODONE AND ACETAMINOPHEN 1 TABLET: 10; 325 TABLET ORAL at 09:07

## 2022-07-08 RX ADMIN — ALPRAZOLAM 0.5 MG: 0.5 TABLET ORAL at 09:07

## 2022-07-08 RX ADMIN — DOCUSATE SODIUM 100 MG: 100 CAPSULE, LIQUID FILLED ORAL at 08:07

## 2022-07-08 RX ADMIN — OXYCODONE AND ACETAMINOPHEN 1 TABLET: 10; 325 TABLET ORAL at 08:07

## 2022-07-08 RX ADMIN — OXYCODONE AND ACETAMINOPHEN 1 TABLET: 10; 325 TABLET ORAL at 05:07

## 2022-07-08 RX ADMIN — CARVEDILOL 3.12 MG: 3.12 TABLET, FILM COATED ORAL at 08:07

## 2022-07-08 RX ADMIN — DOCOSANOL 10 % TOPICAL CREAM: at 01:07

## 2022-07-08 RX ADMIN — METHOCARBAMOL 500 MG: 500 TABLET ORAL at 03:07

## 2022-07-08 NOTE — PLAN OF CARE
Updated clinicals. Spoke with Adenike at [facility] who was reaching at to pt's family to discuss placement.

## 2022-07-08 NOTE — PT/OT/SLP RE-EVAL
"Occupational Therapy   Re-evaluation    Name: Nilo Claros Jr.  MRN: [id number]  Admitting Diagnosis:  Closed comminuted intertrochanteric fracture of left femur  Recent Surgery: Procedure(s) (LRB):  INSERTION, INTRAMEDULLARY AMIRAH, FEMUR (Left) 10 Days Post-Op    Recommendations:     Discharge Recommendations: rehabilitation facility  Discharge Equipment Recommendations:  walker, rolling  Barriers to discharge:  Decreased caregiver support (Pts mom stated that it has been harder to care for him at home)    Assessment:     Nilo Claros Jr. is a 37 y.o. male with a medical diagnosis of Closed comminuted intertrochanteric fracture of left femur.   Performance deficits affecting function are impaired functional mobilty, impaired self care skills, impaired cognition, decreased lower extremity function, pain.      Rehab Prognosis:  Good; patient would benefit from acute skilled OT services to address these deficits and reach maximum level of function.       Plan:     Patient to be seen 5 x/week to address the above listed problems via self-care/home management  · Plan of Care Expires: 7/22/22  · Plan of Care Reviewed with: patient    Subjective     Chief Complaint: "I am in pain"  Patient/Family stated goals: To get him stronger and more independent   Communicated with: RN prior to session.  Pain/Comfort:  · Pain Rating 1: 8/10  · Location - Side 1: Left  · Pain Addressed 1: Reposition, Distraction    Objective:     Communicated with: RN prior to session.  Patient found up in chair with: chair check, peripheral IV upon OT entry to room.    General Precautions: Standard, fall   Orthopedic Precautions:LLE weight bearing as tolerated, ROMAT LLE  Respiratory Status: Room air    Occupational Performance:    Bed Mobility:    · Patient completed Sit to Supine with supervision    Functional Mobility/Transfers:  · Patient completed Sit <> Stand Transfer with contact guard assistance with rolling walker   · Patient " completed Toilet Transfer Step Transfer technique with contact guard assistance with rolling walker  · Functional Mobility: Pt able to complete sit to stand and toilet transfer with CGA for safety. Pt stated that he was experiencing increased amount of pain at the time of therapy. Pt educated on proper use of RW, pt required min cues to follow. Pt able to ambulate back to EOB, with noticed progress on ambulation.     Activities of Daily Living:  · Toileting: stand by assistance pt able to simulate perfoming toileting SBA required for safety. Pt with fair safety awareness throughout ADL tasks.      Cognitive/Visual Perceptual:  Cognitive/Psychosocial Skills:     -       Follows Commands/attention:pt able to follow two step directions with min verbal cues needed to perform  -       Safety awareness/insight to disability: safety awareness is fair   -       Mood/Affect/Coping skills/emotional control: Cooperative    Physical Exam:  Upper Extremity Strength:    -       Right Upper Extremity: WNL  -       Left Upper Extremity: WNL      Treatment & Education:  Education:  Pt educated on POC, pt verbalized agreement. Pt expressed that he was experiencing pain but participated throughout reevaluation. Pts HR noted to be at 136 maintained same throughout session.       Patient left HOB elevated with all lines intact, call button in reach and RN notified.  Discussed with CNA, bed alarm not working properly.  GOALS:   Multidisciplinary Problems     Occupational Therapy Goals        Problem: Occupational Therapy    Goal Priority Disciplines Outcome Interventions   Occupational Therapy Goal     OT, PT/OT Ongoing, Progressing    Description: Goals to be met by: 7/12/22    Patient will increase functional independence with ADLs by performing:    Feeding with [place].  UE Dressing with [place].  LE Dressing with [place].  Grooming while standing at sink with [place].  Toileting from toilet with [place] for  hygiene and clothing management.   Toilet transfer to toilet with [place].                     History:     Past Medical History:   Diagnosis Date    Encephalomalacia     H/O brain tumor        Past Surgical History:   Procedure Laterality Date    INTRAMEDULLARY RODDING OF FEMUR Left 6/28/2022    Procedure: INSERTION, INTRAMEDULLARY AMIRAH, FEMUR;  Surgeon: Chalino Melgar DO;  Location: [facility];  Service: Orthopedics;  Laterality: Left;       Time Tracking:     OT Date of Treatment: 07/08/22  OT Start Time: 1337  OT Stop Time: 1356  OT Total Time (min): 19 min    Billable Minutes:Re-eval     7/8/2022

## 2022-07-08 NOTE — PROGRESS NOTES
[facility] Hospital Medicine Progress Note        Chief Complaint: Inpatient Follow-up for  hip fracture     HPI:   36 year old male with pmhx of childhood brain tumor s/p excision with chornic developmental delay, anxiety, and hypothyroidism presents to the ED after a fall at home.  Mother at bedside providing history as patient unable to participate in conversation.  Mother reports multiple falls lately and she's having greater difficulty caring for him.  States he tripped and fell last night and she was unable to get him off the floor.  EMS was called and he was transported ot the hospital.  XRs revealed L intertrochanteric femur fracture.  Vitals and labs stable and at baseline.  He does not take any blood thinners.  Compliant with home med regimen.  Orthopedics was consulted and the patient was admitted to the hospitalist group. Taken to the OR on 6/28 for repair and returned to the floor in stable condition. He worked with PT and OT.  Case mgmt consulted for rehab placement.     Interval Hx:  Awaiting placement  No new complaints     Objective/physical exam:  General: In no acute distress, afebrile  Chest: Clear to auscultation bilaterally  Heart: RRR, +S1, S2, no appreciable murmur  Abdomen: Soft, nontender, BS +  MSK: Warm, no lower extremity edema, no clubbing or cyanosis  Neurologic: Alert and oriented x4, Cranial nerve II-XII intact, Strength 5/5 in all 4 extremities    VITAL SIGNS: 24 HRS MIN & MAX LAST   Temp  Min: 97.6 °F (36.4 °C)  Max: 98.7 °F (37.1 °C) 98.3 °F (36.8 °C)   BP  Min: 115/74  Max: 130/80 118/71   Pulse  Min: 104  Max: 115  104   Resp  Min: 18  Max: 20 18   SpO2  Min: 97 %  Max: 99 % 97 %       Recent Labs   Lab 07/02/22  0617 07/07/22  0750   WBC 6.5 6.1   RBC 3.25* 3.45*   HGB 8.9* 9.3*   HCT 26.5* 31.2*   MCV 81.5 90.4   MCH 27.4 27.0   MCHC 33.6 29.8*   RDW 15.6 16.6    261   MPV 9.2 8.7       Recent Labs   Lab 07/02/22  0617 07/07/22  0750   NA  137 136   K 4.0 4.1   CO2 29 24   BUN 8.0* 7.8*   CREATININE 0.66* 0.72*   CALCIUM 8.9 8.9          Microbiology Results (last 7 days)     ** No results found for the last 168 hours. **           See below for Radiology    Scheduled Med:   carvediloL  3.125 mg Oral BID    docosanoL   Topical (Top) 5x Daily    docusate sodium  100 mg Oral Daily    enoxaparin  40 mg Subcutaneous Daily    hydrOXYzine HCL  25 mg Oral Daily    levothyroxine  50 mcg Oral Daily    methocarbamoL  500 mg Oral TID    QUEtiapine  200 mg Oral QHS        Continuous Infusions:       PRN Meds:  sodium chloride, acetaminophen, acetaminophen, ALPRAZolam, aluminum-magnesium hydroxide-simethicone, bisacodyL, bisacodyL, ceFAZolin 2 g/50mL Dextrose IVPB, diphenhydrAMINE, famotidine, HYDROmorphone, magnesium hydroxide 400 mg/5 ml, metoprolol, morphine, morphine, ondansetron, ondansetron, ondansetron, ondansetron, oxyCODONE-acetaminophen, oxyCODONE-acetaminophen, polyethylene glycol, prochlorperazine, senna, senna-docusate 8.6-50 mg, sodium chloride 0.9%, trazodone       Assessment/Plan:   Left intertrochanteric femur fracture, displaced   Status post intramedullary nailing June 28, 2022   Fall at home   Essential Hypertension   Hypothyroidism   H/o mild developmental delay      Continue PT/OT  Awaiting rehab placement  Medically stable for discharge  Labs from yesterday stable. H/H improving slowly  Psych eval yesterday per SNF request. No new recommendations  Prescriptions printed and on the chart    Patient condition:  Stable    Anticipated discharge and Disposition: TBD      All diagnosis and differential diagnosis have been reviewed; assessment and plan has been documented; I have personally reviewed the labs and test results that are presently available; I have reviewed the patients medication list; I have reviewed the consulting providers response and recommendations. I have reviewed or attempted to review medical records based upon their  availability    All of the patient's questions have been  addressed and answered. Patient's is agreeable to the above stated plan. I will continue to monitor closely and make adjustments to medical management as needed.  _____________________________________________________________________      Radiology:  Fl Modified Barium Swallow Speech  See Speech Therapist Notes    This procedure was auto-finalized.      Clemente Patel MD   07/08/2022

## 2022-07-08 NOTE — PROGRESS NOTES
[facility] Tele[facility]  Orthopedics  Progress Note    Patient Name: Nilo Claros Jr.  MRN: [id number]  Admission Date: 6/28/2022  Hospital Length of Stay: 10 days  Attending Provider: Clemente Patel MD  Primary Care Provider: Primary Doctor No  Follow-up For: Procedure(s) (LRB):  INSERTION, INTRAMEDULLARY AMIRAH, FEMUR (Left)    Post-Operative Day: 10 Day Post-Op  Subjective:     Principal Problem:Closed comminuted intertrochanteric fracture of left femur    Principal Orthopedic Problem: 10 Day Post-Op IMN left IT femur fx    Interval History: Patient is doing well this morning. Resting comfortably. No acute complaints. Continues to work with physical therapy.     Review of patient's allergies indicates:   Allergen Reactions    Toradol [ketorolac]        Current Facility-Administered Medications   Medication    0.9%  NaCl infusion (for blood administration)    acetaminophen tablet 650 mg    acetaminophen tablet 650 mg    ALPRAZolam tablet 0.5 mg    aluminum-magnesium hydroxide-simethicone 200-200-20 mg/5 mL suspension 30 mL    bisacodyL suppository 10 mg    bisacodyL suppository 10 mg    carvediloL tablet 3.125 mg    cefazolin (ANCEF) 2 gram in dextrose 5% 50 mL IVPB (premix)    diphenhydrAMINE capsule 25 mg    docosanoL 10 % Crea    docusate sodium capsule 100 mg    enoxaparin injection 40 mg    famotidine tablet 20 mg    HYDROmorphone (PF) injection 0.5 mg    hydrOXYzine HCL tablet 25 mg    levothyroxine tablet 50 mcg    magnesium hydroxide 400 mg/5 ml suspension 2,400 mg    methocarbamoL tablet 500 mg    metoprolol injection 5 mg    morphine injection 2 mg    morphine injection 2 mg    ondansetron disintegrating tablet 4 mg    ondansetron injection 4 mg    ondansetron injection 4 mg    ondansetron injection 4 mg    oxyCODONE-acetaminophen  mg per tablet 1 tablet    oxyCODONE-acetaminophen 5-325 mg per tablet 1 tablet    polyethylene glycol packet  "17 g    prochlorperazine injection Soln 5 mg    QUEtiapine tablet 200 mg    senna tablet 8.6 mg    senna-docusate 8.6-50 mg per tablet 1 tablet    sodium chloride 0.9% flush 10 mL    trazodone split tablet 25 mg     Objective:     Vital Signs (Most Recent):  Temp: 98.3 °F (36.8 °C) (07/08/22 0339)  Pulse: 104 (07/08/22 0339)  Resp: 18 (07/07/22 2352)  BP: 118/71 (07/08/22 0339)  SpO2: 97 % (07/08/22 0339) Vital Signs (24h Range):  Temp:  [97.6 °F (36.4 °C)-98.7 °F (37.1 °C)] 98.3 °F (36.8 °C)  Pulse:  [104-115] 104  Resp:  [18-20] 18  SpO2:  [97 %-99 %] 97 %  BP: (115-130)/(71-83) 118/71     Weight: 66.2 kg (145 lb 15.1 oz)  Height: 5' 10" (177.8 cm)  Body mass index is 20.94 kg/m².      Intake/Output Summary (Last 24 hours) at 7/8/2022 0717  Last data filed at 7/8/2022 0500  Gross per 24 hour   Intake 680 ml   Output 500 ml   Net 180 ml       Physical Exam:   Musculoskeletal:     Left lower extremity: Dressing is clean and dry with minimal drainage this morning, changed this morning; compartments are soft and compressible; Tolerates passive range of motion at the ankle, knee, and hip; appropriate tenderness to palpation; dorsi/plantar flexes the foot; SILT distally; BCR distally; DP pulse palpable      Diagnostic Findings:   Significant Labs:   Recent Lab Results       07/07/22  2119   07/07/22  0750   07/06/22  1518   07/05/22  1118        Anion Gap   11.0           Baso #   0.09           Basophil %   1.5           BUN   7.8           BUN/CREAT RATIO   11           Calcium   8.9           Chloride   101           CO2   24           Creatinine   0.72           eGFR if non    >60           Eos #   0.32           Eosinophil %   5.2           Glucose   105           Hematocrit   31.2           Hemoglobin   9.3           Immature Grans (Abs)   0.17           Immature Granulocytes   2.8           Lymph #   1.39           LYMPH %   22.6           MCH   27.0           MCHC   29.8           MCV   " 90.4           Mono #   0.52           Mono %   8.5           MPV   8.7           Neut #   3.7           Neut %   59.4           nRBC   0.0           Platelets   261           POCT Glucose 128     110   98       Potassium   4.1           RBC   3.45           RDW   16.6           Sodium   136           WBC   6.1                  Significant Imaging: I have reviewed and interpreted all pertinent imaging results/findings.     Assessment/Plan:     Active Diagnoses:    Diagnosis Date Noted POA    PRINCIPAL PROBLEM:  Closed comminuted intertrochanteric fracture of left femur [S72.142A] 06/28/2022 Yes    Encounter for psychological evaluation [Z00.8] 07/07/2022 Not Applicable      Problems Resolved During this Admission:   H&H remains low but stable this morning.   WBAT and ROMAT. Continue to work with therapy on mobilizing, appears to have improved since surgery.   Awaiting SNF placement at this time.    Continue multimodal pain control as needed. Daily dry dressing changes as needed  Follow up with Dr. Melgar in approx 3 weeks for repeat x-rays and evaluation. . Please call with any questions or concerns. He is orthopaedically stable for transfer at this time.     The above findings, diagnostics, and treatment plan were discussed with Dr. Melgar who is in agreement with the plan of care except as stated in additional documentation.      Lois Clements PA-C  Orthopedic Trauma Surgery  [facility]

## 2022-07-08 NOTE — PROGRESS NOTES
"Nutrition   Progress Note      Recommendations:  1. Continue diet per SLP recs  2. Monitor intake, wts, labs      Reason for Evaluation:  Length of Stay    Diagnosis:    1. Hip fracture    2. Chest pain    3. Closed comminuted intertrochanteric fracture of left femur, initial encounter    4. Tachycardia        Relevant Medical History:    Past Medical History:   Diagnosis Date    Encephalomalacia     H/O brain tumor          Nutrition Diet History:    Factors affecting nutritional intake: difficulty / impaired swallowing    Food / Yazidism / Culture Preferences:  Does not like the altered diet      Nutrition Prescription Ordered:    Current Diet Order: Soft and bite sized     Appetite:  Fair (50% - 75% po intake)    PO intake: 50 - 75 %      Labs / Medications / Procedures:    Nutrition Related Medications: docusate, oxycodone    Nutrition Related Labs:  7/7:BUN 7.8 Crea 0.72 Glu 105      Anthropometrics:  Height: 5' 10" (1.778 m)not available  Admit Weight:  Weight: 66.2 kg (145 lb 15.1 oz)  Latest Weight:  66.2 kg (145 lb 15.1 oz)    Wt Readings from Last 5 Encounters:   07/06/22 66.2 kg (145 lb 15.1 oz)     IBW: n/a  %IBW: n/a  UBW: 66kg  %Weight Change: stable  Body mass index is 20.94 kg/m².  BMI classification:  n/a      Nutrition Narrative:  7/3: Pt stated he wants regular food. Nsg stated she will speak with SLP for possible diet upgrade since pt is doing much better. S/p Left IM nailing of hip fx on 6/28. Pt also noted they are checking his blood sugars and he is not diabetic-nsg notifying MD to discontinue CBG checks. Pt noted +pain of hip.   7/8: pt now on upgraded diet of soft and bite sized, eating most of his meals    Monitoring and Evaluation:    Nutrition Monitoring and Evaluation:  food and beverage intake and diet order    Nutrition Risk:  Level of Nutrition Risk:  Low  Frequency of Follow up:  Dietitian will f/up within 7 days.          "

## 2022-07-08 NOTE — PT/OT/SLP PROGRESS
Physical Therapy         Treatment        Nilo Claros Jr.   MRN: [id number]     PT Received On: 07/08/22  PT Start Time: 1132     PT Stop Time: 1201    PT Total Time (min): 29 min       Billable Minutes:  Gait [id number] and Therapeutic Activity 14  Total Minutes: 29    Treatment Type: Treatment  PT/PTA: PTA     PTA Visit Number: 2       General Precautions: Standard, fall  Orthopedic Precautions: Orthopedic Precautions : LLE weight bearing as tolerated   Braces:      Spiritual, Cultural Beliefs, Scientologist Practices, Values that Affect Care: no    Subjective:  Communicated with nurse prior to session.         Objective:  Patient found supine, with Patient found with: telemetry    Functional Mobility:  Bed Mobility:   Supine to sit: Minimal Assistance   Sit to supine: Activity did not occur   Rolling: Minimal Assistance   Scooting: Minimal Assistance    Balance:   Static Sit: FAIR+: Able to take MINIMAL challenges from all directions  Dynamic Sit:  FAIR+: Maintains balance through MINIMAL excursions of active trunk motion  Static Stand: POOR+: Needs MINIMAL assist to maintain  Dynamic stand: POOR+: Needs MIN (minimal ) assist during gait    Transfer Training:  Sit to stand:Minimal Assistance with Rolling Walker x2 trials    Gait Training:  Patient gait trained   50  Feet and 30 feet on level tile with Rolling Walker with Minimal Assistance.  Pt with demonstarting a  2-point gait with decreased kolton, decreased step length and decreased weight-shifting ability.Impairments contributing to gait deviations include pain and decreased strength. Noted decreased weight bearing through LLE with cues to correct.     Activity Tolerance:  Patient tolerated treatment well    Patient left up in chair with all lines intact, call button in reach and chair alarm on.    Assessment:  Nilo Claros Jr. is a 37 y.o. male with a medical diagnosis of Closed comminuted intertrochanteric fracture of left femur. He presents with  increased standing tolerance. Pt required increased encouragement to ambulate this AM and became frustrated with verbal cues.     Rehab potential is good.    Activity tolerance: Good    Discharge recommendations: Discharge Facility/Level of Care Needs: rehabilitation facility     Equipment recommendations: Equipment Needed After Discharge: walker, rolling     GOALS:   Multidisciplinary Problems     Physical Therapy Goals        Problem: Physical Therapy    Goal Priority Disciplines Outcome Goal Variances Interventions   Physical Therapy Goal     PT, PT/OT Ongoing, Progressing     Description: Goals to be met by: 22     Patient will increase functional independence with mobility by performin. Supine to sit with MInimal Assistance  2. Sit to supine with MInimal Assistance  3. Sit to stand transfer with Minimal Assistance  4. Bed to chair transfer with Minimal Assistance using Rolling Walker  5. Gait  x 50 feet with Minimal Assistance using Rolling Walker.                      PLAN:    Patient to be seen BID  to address the above listed problems via gait training, therapeutic activities  Plan of Care expires: 22  Plan of Care reviewed with: patient         2022

## 2022-07-09 PROCEDURE — 63600175 PHARM REV CODE 636 W HCPCS: Performed by: PHYSICIAN ASSISTANT

## 2022-07-09 PROCEDURE — 25000003 PHARM REV CODE 250: Performed by: HOSPITALIST

## 2022-07-09 PROCEDURE — 25000003 PHARM REV CODE 250: Performed by: PHYSICIAN ASSISTANT

## 2022-07-09 PROCEDURE — 97116 GAIT TRAINING THERAPY: CPT | Mod: CQ

## 2022-07-09 PROCEDURE — 11000001 HC ACUTE MED/SURG PRIVATE ROOM

## 2022-07-09 RX ADMIN — OXYCODONE AND ACETAMINOPHEN 1 TABLET: 10; 325 TABLET ORAL at 10:07

## 2022-07-09 RX ADMIN — ALPRAZOLAM 0.5 MG: 0.5 TABLET ORAL at 09:07

## 2022-07-09 RX ADMIN — OXYCODONE AND ACETAMINOPHEN 1 TABLET: 10; 325 TABLET ORAL at 09:07

## 2022-07-09 RX ADMIN — DOCUSATE SODIUM 100 MG: 100 CAPSULE, LIQUID FILLED ORAL at 08:07

## 2022-07-09 RX ADMIN — CARVEDILOL 3.12 MG: 3.12 TABLET, FILM COATED ORAL at 09:07

## 2022-07-09 RX ADMIN — ENOXAPARIN SODIUM 40 MG: 100 INJECTION SUBCUTANEOUS at 04:07

## 2022-07-09 RX ADMIN — DOCOSANOL 10 % TOPICAL CREAM: at 02:07

## 2022-07-09 RX ADMIN — DOCOSANOL 10 % TOPICAL CREAM: at 05:07

## 2022-07-09 RX ADMIN — METHOCARBAMOL 500 MG: 500 TABLET ORAL at 09:07

## 2022-07-09 RX ADMIN — OXYCODONE AND ACETAMINOPHEN 1 TABLET: 10; 325 TABLET ORAL at 04:07

## 2022-07-09 RX ADMIN — LEVOTHYROXINE SODIUM 50 MCG: 25 TABLET ORAL at 08:07

## 2022-07-09 RX ADMIN — DIPHENHYDRAMINE HYDROCHLORIDE 25 MG: 25 CAPSULE ORAL at 09:07

## 2022-07-09 RX ADMIN — METHOCARBAMOL 500 MG: 500 TABLET ORAL at 02:07

## 2022-07-09 RX ADMIN — QUETIAPINE 200 MG: 100 TABLET ORAL at 09:07

## 2022-07-09 RX ADMIN — DOCOSANOL 10 % TOPICAL CREAM: at 04:07

## 2022-07-09 RX ADMIN — METHOCARBAMOL 500 MG: 500 TABLET ORAL at 08:07

## 2022-07-09 RX ADMIN — HYDROXYZINE HYDROCHLORIDE 25 MG: 25 TABLET ORAL at 08:07

## 2022-07-09 RX ADMIN — CARVEDILOL 3.12 MG: 3.12 TABLET, FILM COATED ORAL at 08:07

## 2022-07-09 RX ADMIN — DOCOSANOL 10 % TOPICAL CREAM: at 10:07

## 2022-07-09 RX ADMIN — DOCOSANOL 10 % TOPICAL CREAM: at 09:07

## 2022-07-09 NOTE — PT/OT/SLP PROGRESS
Occupational Therapy      Patient Name:  Nilo Claros Jr.   MRN:  [id number]    Pt refused this date stating she was in too much pain and wanting to be seen after dialysis. Educated Pt on benefits of OOB tasks and POC. Pt verbalized understanding. KRUEGER will f/u as schedule allows.    7/9/2022

## 2022-07-09 NOTE — PT/OT/SLP PROGRESS
Physical Therapy Treatment    Patient Name:  Nilo Claros Jr.   MRN:  [id number]    Recommendations:     Discharge Recommendations:  rehabilitation facility   Discharge Equipment Recommendations: walker, rolling     Subjective     Patient flat.     Objective:     General Precautions: Standard, fall   Orthopedic Precautions:LLE weight bearing as tolerated   Braces:    Respiratory Status: Room air     Communicated with nurse prior to treatment.     Functional Mobility:    Rolling:Stand-by Assistance  Supine to sit:Minimal Assistance  Sit to stand transfer: Stand-by Assistance  Bed to chair transfer:Minimal Assistance  50 ft with RW min assist and cues for sequence.       AM-PAC 6 CLICK MOBILITY        Patient left supie with all lines intact..    GOALS:   Multidisciplinary Problems     Physical Therapy Goals        Problem: Physical Therapy    Goal Priority Disciplines Outcome Goal Variances Interventions   Physical Therapy Goal     PT, PT/OT Ongoing, Progressing     Description: Goals to be met by: 22     Patient will increase functional independence with mobility by performin. Supine to sit with MInimal Assistance  2. Sit to supine with MInimal Assistance  3. Sit to stand transfer with Minimal Assistance  4. Bed to chair transfer with Minimal Assistance using Rolling Walker  5. Gait  x 50 feet with Minimal Assistance using Rolling Walker.                      Assessment:     Nilo Claros Jr. is a 37 y.o. male admitted with a medical diagnosis of Closed comminuted intertrochanteric fracture of left femur.     Rehab Prognosis: Good; patient would benefit from acute skilled PT services to address these deficits and reach maximum level of function.    Recent Surgery: Procedure(s) (LRB):  INSERTION, INTRAMEDULLARY AMIRAH, FEMUR (Left) 11 Days Post-Op    Plan:     During this hospitalization, patient to be seen BID to address the identified rehab impairments via gait training, therapeutic activities  and progress toward the following goals:    · Plan of Care Expires:  07/28/22    Billable Minutes     Billable Minutes: Gait Training 14    Treatment Type: Treatment  PT/PTA: PTA     PTA Visit Number: 3     07/09/2022

## 2022-07-09 NOTE — PROGRESS NOTES
[facility] Hospital Medicine Progress Note        Chief Complaint: Inpatient Follow-up for hip fracture     HPI:   36 year old male with pmhx of childhood brain tumor s/p excision with chornic developmental delay, anxiety, and hypothyroidism presents to the ED after a fall at home.  Mother at bedside providing history as patient unable to participate in conversation.  Mother reports multiple falls lately and she's having greater difficulty caring for him.  States he tripped and fell last night and she was unable to get him off the floor.  EMS was called and he was transported ot the hospital.  XRs revealed L intertrochanteric femur fracture.  Vitals and labs stable and at baseline.  He does not take any blood thinners.  Compliant with home med regimen.  Orthopedics was consulted and the patient was admitted to the hospitalist group. Taken to the OR on 6/28 for repair and returned to the floor in stable condition. He worked with PT and OT.  Case mgmt consulted for rehab placement. Originally found placement but insurance denied, further extending his inpatient stay.  Now recommending SNF. Requires psych eval prior to SNF placement. No additional recs from psych. Patient well adjusted.      Interval Hx:  No new complaints. No fmaily at bedside.   Working well with therapy     Objective/physical exam:  General: In no acute distress, afebrile  Chest: Clear to auscultation bilaterally  Heart: RRR, +S1, S2, no appreciable murmur  Abdomen: Soft, nontender, BS +  MSK: Warm, no lower extremity edema, no clubbing or cyanosis  Neurologic: Alert and oriented x4, Cranial nerve II-XII intact, Strength 5/5 in all 4 extremities    VITAL SIGNS: 24 HRS MIN & MAX LAST   Temp  Min: 97.3 °F (36.3 °C)  Max: 98.1 °F (36.7 °C) 97.3 °F (36.3 °C)   BP  Min: 106/73  Max: 126/78 106/73   Pulse  Min: 106  Max: 110  107   Resp  Min: 18  Max: 22 18   SpO2  Min: 97 %  Max: 100 % 97 %       Recent Labs   Lab  07/07/22  0750   WBC 6.1   RBC 3.45*   HGB 9.3*   HCT 31.2*   MCV 90.4   MCH 27.0   MCHC 29.8*   RDW 16.6      MPV 8.7       Recent Labs   Lab 07/07/22  0750      K 4.1   CO2 24   BUN 7.8*   CREATININE 0.72*   CALCIUM 8.9          Microbiology Results (last 7 days)     ** No results found for the last 168 hours. **           See below for Radiology    Scheduled Med:   carvediloL  3.125 mg Oral BID    docosanoL   Topical (Top) 5x Daily    docusate sodium  100 mg Oral Daily    enoxaparin  40 mg Subcutaneous Daily    hydrOXYzine HCL  25 mg Oral Daily    levothyroxine  50 mcg Oral Daily    methocarbamoL  500 mg Oral TID    QUEtiapine  200 mg Oral QHS        Continuous Infusions:       PRN Meds:  sodium chloride, acetaminophen, acetaminophen, ALPRAZolam, aluminum-magnesium hydroxide-simethicone, bisacodyL, bisacodyL, ceFAZolin 2 g/50mL Dextrose IVPB, diphenhydrAMINE, famotidine, HYDROmorphone, magnesium hydroxide 400 mg/5 ml, metoprolol, morphine, morphine, ondansetron, ondansetron, ondansetron, ondansetron, oxyCODONE-acetaminophen, oxyCODONE-acetaminophen, polyethylene glycol, prochlorperazine, senna, senna-docusate 8.6-50 mg, sodium chloride 0.9%, trazodone       Assessment/Plan:   Left intertrochanteric femur fracture, displaced   Status post intramedullary nailing June 28, 2022   Fall at home   Essential Hypertension   Hypothyroidism   H/o mild developmental delay      Continue PT/OT  Awaiting SNF placement, insurance denied rehab  Medically stable for discharge  Prescriptions printed and on the chart    Patient condition:  Stable    Anticipated discharge and Disposition: TBD      All diagnosis and differential diagnosis have been reviewed; assessment and plan has been documented; I have personally reviewed the labs and test results that are presently available; I have reviewed the patients medication list; I have reviewed the consulting providers response and recommendations. I have reviewed or  attempted to review medical records based upon their availability    All of the patient's questions have been  addressed and answered. Patient's is agreeable to the above stated plan. I will continue to monitor closely and make adjustments to medical management as needed.  _____________________________________________________________________      Radiology:  Fl Modified Barium Swallow Speech  See Speech Therapist Notes    This procedure was auto-finalized.      Clemente Patel MD   07/09/2022

## 2022-07-10 LAB
ANION GAP SERPL CALC-SCNC: 8 MEQ/L
BASOPHILS # BLD AUTO: 0.06 X10(3)/MCL (ref 0–0.2)
BASOPHILS NFR BLD AUTO: 0.9 %
BUN SERPL-MCNC: 7.4 MG/DL (ref 8.9–20.6)
CALCIUM SERPL-MCNC: 8.7 MG/DL (ref 8.4–10.2)
CHLORIDE SERPL-SCNC: 103 MMOL/L (ref 98–107)
CO2 SERPL-SCNC: 25 MMOL/L (ref 22–29)
CREAT SERPL-MCNC: 0.72 MG/DL (ref 0.73–1.18)
CREAT/UREA NIT SERPL: 10
EOSINOPHIL # BLD AUTO: 0.36 X10(3)/MCL (ref 0–0.9)
EOSINOPHIL NFR BLD AUTO: 5.4 %
ERYTHROCYTE [DISTWIDTH] IN BLOOD BY AUTOMATED COUNT: 16.1 % (ref 11.5–17)
GLUCOSE SERPL-MCNC: 100 MG/DL (ref 74–100)
HCT VFR BLD AUTO: 26.3 % (ref 42–52)
HGB BLD-MCNC: 8 GM/DL (ref 14–18)
IMM GRANULOCYTES # BLD AUTO: 0.11 X10(3)/MCL (ref 0–0.04)
IMM GRANULOCYTES NFR BLD AUTO: 1.7 %
LYMPHOCYTES # BLD AUTO: 1.23 X10(3)/MCL (ref 0.6–4.6)
LYMPHOCYTES NFR BLD AUTO: 18.6 %
MCH RBC QN AUTO: 26.6 PG (ref 27–31)
MCHC RBC AUTO-ENTMCNC: 30.4 MG/DL (ref 33–36)
MCV RBC AUTO: 87.4 FL (ref 80–94)
MONOCYTES # BLD AUTO: 0.49 X10(3)/MCL (ref 0.1–1.3)
MONOCYTES NFR BLD AUTO: 7.4 %
NEUTROPHILS # BLD AUTO: 4.4 X10(3)/MCL (ref 2.1–9.2)
NEUTROPHILS NFR BLD AUTO: 66 %
NRBC BLD AUTO-RTO: 0 %
PLATELET # BLD AUTO: 270 X10(3)/MCL (ref 130–400)
PMV BLD AUTO: 8.8 FL (ref 7.4–10.4)
POTASSIUM SERPL-SCNC: 4.4 MMOL/L (ref 3.5–5.1)
RBC # BLD AUTO: 3.01 X10(6)/MCL (ref 4.7–6.1)
SODIUM SERPL-SCNC: 136 MMOL/L (ref 136–145)
WBC # SPEC AUTO: 6.6 X10(3)/MCL (ref 4.5–11.5)

## 2022-07-10 PROCEDURE — 11000001 HC ACUTE MED/SURG PRIVATE ROOM

## 2022-07-10 PROCEDURE — 25000003 PHARM REV CODE 250: Performed by: PHYSICIAN ASSISTANT

## 2022-07-10 PROCEDURE — 80048 BASIC METABOLIC PNL TOTAL CA: CPT | Performed by: HOSPITALIST

## 2022-07-10 PROCEDURE — 25000003 PHARM REV CODE 250: Performed by: HOSPITALIST

## 2022-07-10 PROCEDURE — 85025 COMPLETE CBC W/AUTO DIFF WBC: CPT | Performed by: HOSPITALIST

## 2022-07-10 PROCEDURE — 36415 COLL VENOUS BLD VENIPUNCTURE: CPT | Performed by: HOSPITALIST

## 2022-07-10 RX ADMIN — CARVEDILOL 3.12 MG: 3.12 TABLET, FILM COATED ORAL at 09:07

## 2022-07-10 RX ADMIN — METHOCARBAMOL 500 MG: 500 TABLET ORAL at 09:07

## 2022-07-10 RX ADMIN — HYDROXYZINE HYDROCHLORIDE 25 MG: 25 TABLET ORAL at 08:07

## 2022-07-10 RX ADMIN — DOCOSANOL 10 % TOPICAL CREAM: at 08:07

## 2022-07-10 RX ADMIN — DOCUSATE SODIUM 100 MG: 100 CAPSULE, LIQUID FILLED ORAL at 08:07

## 2022-07-10 RX ADMIN — QUETIAPINE 200 MG: 100 TABLET ORAL at 09:07

## 2022-07-10 RX ADMIN — DOCOSANOL 10 % TOPICAL CREAM: at 03:07

## 2022-07-10 RX ADMIN — CARVEDILOL 3.12 MG: 3.12 TABLET, FILM COATED ORAL at 08:07

## 2022-07-10 RX ADMIN — LEVOTHYROXINE SODIUM 50 MCG: 25 TABLET ORAL at 08:07

## 2022-07-10 RX ADMIN — OXYCODONE AND ACETAMINOPHEN 1 TABLET: 10; 325 TABLET ORAL at 09:07

## 2022-07-10 RX ADMIN — DOCOSANOL 10 % TOPICAL CREAM: at 09:07

## 2022-07-10 RX ADMIN — DIPHENHYDRAMINE HYDROCHLORIDE 25 MG: 25 CAPSULE ORAL at 09:07

## 2022-07-10 RX ADMIN — METHOCARBAMOL 500 MG: 500 TABLET ORAL at 03:07

## 2022-07-10 RX ADMIN — DOCOSANOL 10 % TOPICAL CREAM: at 06:07

## 2022-07-10 RX ADMIN — METHOCARBAMOL 500 MG: 500 TABLET ORAL at 08:07

## 2022-07-10 RX ADMIN — DOCOSANOL 10 % TOPICAL CREAM: at 10:07

## 2022-07-10 RX ADMIN — ALPRAZOLAM 0.5 MG: 0.5 TABLET ORAL at 09:07

## 2022-07-10 NOTE — PROGRESS NOTES
[facility] Hospital Medicine Progress Note        Chief Complaint: Inpatient Follow-up for hip fracture     HPI:   36 year old male with pmhx of childhood brain tumor s/p excision with chornic developmental delay, anxiety, and hypothyroidism presents to the ED after a fall at home.  Mother at bedside providing history as patient unable to participate in conversation.  Mother reports multiple falls lately and she's having greater difficulty caring for him.  States he tripped and fell last night and she was unable to get him off the floor.  EMS was called and he was transported ot the hospital.  XRs revealed L intertrochanteric femur fracture.  Vitals and labs stable and at baseline.  He does not take any blood thinners.  Compliant with home med regimen.  Orthopedics was consulted and the patient was admitted to the hospitalist group. Taken to the OR on 6/28 for repair and returned to the floor in stable condition. He worked with PT and OT.  Case mgmt consulted for rehab placement. Originally found placement but insurance denied, further extending his inpatient stay.  Now recommending SNF. Requires psych eval prior to SNF placement. No additional recs from psych. Patient well adjusted.      Interval Hx:  NO complaints or issues  Awaiting placement  Hemodynamically stable    Objective/physical exam:  General: In no acute distress, afebrile  Chest: Clear to auscultation bilaterally  Heart: RRR, +S1, S2, no appreciable murmur  Abdomen: Soft, nontender, BS +  MSK: Warm, no lower extremity edema, no clubbing or cyanosis  Neurologic: Alert and oriented x4, Cranial nerve II-XII intact, Strength 5/5 in all 4 extremities    VITAL SIGNS: 24 HRS MIN & MAX LAST   Temp  Min: 97.8 °F (36.6 °C)  Max: 98.1 °F (36.7 °C) 98.1 °F (36.7 °C)   BP  Min: 92/58  Max: 116/75 101/68   Pulse  Min: 109  Max: 115  (!) 111   Resp  Min: 16  Max: 18 16   SpO2  Min: 96 %  Max: 98 % 97 %       Recent Labs   Lab  07/07/22  0750 07/10/22  0520   WBC 6.1 6.6   RBC 3.45* 3.01*   HGB 9.3* 8.0*   HCT 31.2* 26.3*   MCV 90.4 87.4   MCH 27.0 26.6*   MCHC 29.8* 30.4*   RDW 16.6 16.1    270   MPV 8.7 8.8       Recent Labs   Lab 07/07/22  0750 07/10/22  0520    136   K 4.1 4.4   CO2 24 25   BUN 7.8* 7.4*   CREATININE 0.72* 0.72*   CALCIUM 8.9 8.7          Microbiology Results (last 7 days)     ** No results found for the last 168 hours. **           See below for Radiology    Scheduled Med:   carvediloL  3.125 mg Oral BID    docosanoL   Topical (Top) 5x Daily    docusate sodium  100 mg Oral Daily    enoxaparin  40 mg Subcutaneous Daily    hydrOXYzine HCL  25 mg Oral Daily    levothyroxine  50 mcg Oral Daily    methocarbamoL  500 mg Oral TID    QUEtiapine  200 mg Oral QHS        Continuous Infusions:       PRN Meds:  sodium chloride, acetaminophen, acetaminophen, ALPRAZolam, aluminum-magnesium hydroxide-simethicone, bisacodyL, bisacodyL, ceFAZolin 2 g/50mL Dextrose IVPB, diphenhydrAMINE, famotidine, HYDROmorphone, magnesium hydroxide 400 mg/5 ml, metoprolol, morphine, morphine, ondansetron, ondansetron, ondansetron, ondansetron, oxyCODONE-acetaminophen, oxyCODONE-acetaminophen, polyethylene glycol, prochlorperazine, senna, senna-docusate 8.6-50 mg, sodium chloride 0.9%, trazodone       Assessment/Plan:   Left intertrochanteric femur fracture, displaced   Status post intramedullary nailing June 28, 2022   Fall at home   Essential Hypertension   Hypothyroidism   H/o mild developmental delay      Continue PT/OT  H/H stabilized  Awaiting SNF placement, insurance denied rehab  Medically stable for discharge  Prescriptions printed and on the chart     Patient condition:  Stable    Anticipated discharge and Disposition: TBD      All diagnosis and differential diagnosis have been reviewed; assessment and plan has been documented; I have personally reviewed the labs and test results that are presently available; I have  reviewed the patients medication list; I have reviewed the consulting providers response and recommendations. I have reviewed or attempted to review medical records based upon their availability    All of the patient's questions have been  addressed and answered. Patient's is agreeable to the above stated plan. I will continue to monitor closely and make adjustments to medical management as needed.  _____________________________________________________________________      Radiology:  Fl Modified Barium Swallow Speech  See Speech Therapist Notes    This procedure was auto-finalized.      Clemente Patel MD   07/10/2022

## 2022-07-10 NOTE — PT/OT/SLP PROGRESS
Occupational Therapy      Patient Name:  Nilo Arambulaev Rodriguez   MRN:  [id number]    Patient not seen today secondary to c/o of not feeling well and resting in bed  . Will follow-up as schedule allows.    7/10/2022

## 2022-07-11 PROCEDURE — 63600175 PHARM REV CODE 636 W HCPCS: Performed by: PHYSICIAN ASSISTANT

## 2022-07-11 PROCEDURE — 97116 GAIT TRAINING THERAPY: CPT | Mod: CQ

## 2022-07-11 PROCEDURE — 25000003 PHARM REV CODE 250: Performed by: HOSPITALIST

## 2022-07-11 PROCEDURE — 97110 THERAPEUTIC EXERCISES: CPT | Mod: CQ

## 2022-07-11 PROCEDURE — 25000003 PHARM REV CODE 250: Performed by: PHYSICIAN ASSISTANT

## 2022-07-11 PROCEDURE — 11000001 HC ACUTE MED/SURG PRIVATE ROOM

## 2022-07-11 RX ADMIN — DOCOSANOL 10 % TOPICAL CREAM: at 05:07

## 2022-07-11 RX ADMIN — ENOXAPARIN SODIUM 40 MG: 100 INJECTION SUBCUTANEOUS at 04:07

## 2022-07-11 RX ADMIN — HYDROXYZINE HYDROCHLORIDE 25 MG: 25 TABLET ORAL at 10:07

## 2022-07-11 RX ADMIN — ALPRAZOLAM 0.5 MG: 0.5 TABLET ORAL at 02:07

## 2022-07-11 RX ADMIN — DOCUSATE SODIUM 100 MG: 100 CAPSULE, LIQUID FILLED ORAL at 10:07

## 2022-07-11 RX ADMIN — METHOCARBAMOL 500 MG: 500 TABLET ORAL at 02:07

## 2022-07-11 RX ADMIN — METHOCARBAMOL 500 MG: 500 TABLET ORAL at 08:07

## 2022-07-11 RX ADMIN — DOCOSANOL 10 % TOPICAL CREAM: at 02:07

## 2022-07-11 RX ADMIN — OXYCODONE AND ACETAMINOPHEN 1 TABLET: 10; 325 TABLET ORAL at 10:07

## 2022-07-11 RX ADMIN — METHOCARBAMOL 500 MG: 500 TABLET ORAL at 10:07

## 2022-07-11 RX ADMIN — CARVEDILOL 3.12 MG: 3.12 TABLET, FILM COATED ORAL at 08:07

## 2022-07-11 RX ADMIN — QUETIAPINE 200 MG: 100 TABLET ORAL at 08:07

## 2022-07-11 RX ADMIN — DOCOSANOL 10 % TOPICAL CREAM: at 10:07

## 2022-07-11 RX ADMIN — CARVEDILOL 3.12 MG: 3.12 TABLET, FILM COATED ORAL at 10:07

## 2022-07-11 RX ADMIN — OXYCODONE AND ACETAMINOPHEN 1 TABLET: 10; 325 TABLET ORAL at 02:07

## 2022-07-11 RX ADMIN — LEVOTHYROXINE SODIUM 50 MCG: 25 TABLET ORAL at 10:07

## 2022-07-11 NOTE — PLAN OF CARE
Problem: Fall Injury Risk  Goal: Absence of Fall and Fall-Related Injury  Outcome: Ongoing, Progressing  Intervention: Identify and Manage Contributors  Flowsheets (Taken 7/11/2022 1206)  Self-Care Promotion: independence encouraged  Medication Review/Management:   medications reviewed   high-risk medications identified

## 2022-07-11 NOTE — PT/OT/SLP PROGRESS
Physical Therapy Treatment    Patient Name:  Nilo Claros Jr.   MRN:  [id number]    Recommendations:     Discharge Recommendations:  rehabilitation facility   Discharge Equipment Recommendations: walker, rolling     Subjective     Patient cooperative and flat.     Objective:     General Precautions: Standard, fall   Orthopedic Precautions:LLE weight bearing as tolerated   Braces:    Respiratory Status: Room air     Communicated with nurse prior to treatment.     Functional Mobility:    Rolling:Stand-by Assistance  Supine to sit:Stand-by Assistance  Sit to stand transfer: Minimal Assistance  Bed to chair transfer:Minimal Assistance  100 ft with RW min assist and WBAT. Cues for sequence and Pt performed LE PRE's to increase strenth, ROM, and endurance to improve overall independence.  AAROM and limited LLE ROM 0-70 degrees.       AM-PAC 6 CLICK MOBILITY        Patient left up in chair with chair alarm on..    GOALS:   Multidisciplinary Problems     Physical Therapy Goals        Problem: Physical Therapy    Goal Priority Disciplines Outcome Goal Variances Interventions   Physical Therapy Goal     PT, PT/OT Ongoing, Progressing     Description: Goals to be met by: 22     Patient will increase functional independence with mobility by performin. Supine to sit with MInimal Assistance  2. Sit to supine with MInimal Assistance  3. Sit to stand transfer with Minimal Assistance  4. Bed to chair transfer with Minimal Assistance using Rolling Walker  5. Gait  x 50 feet with Minimal Assistance using Rolling Walker.                      Assessment:     Nilo Claros Jr. is a 37 y.o. male admitted with a medical diagnosis of Closed comminuted intertrochanteric fracture of left femur.     Rehab Prognosis: Good; patient would benefit from acute skilled PT services to address these deficits and reach maximum level of function.    Recent Surgery: Procedure(s) (LRB):  INSERTION, INTRAMEDULLARY AMIRAH, FEMUR (Left) 13  Days Post-Op    Plan:     During this hospitalization, patient to be seen BID to address the identified rehab impairments via gait training, therapeutic activities and progress toward the following goals:    · Plan of Care Expires:  07/28/22    Billable Minutes     Billable Minutes: Gait Training 13 and Therapeutic Exercise 14    Treatment Type: Treatment  PT/PTA: PTA     PTA Visit Number: 4     07/11/2022

## 2022-07-11 NOTE — PROGRESS NOTES
[facility] Hospital Medicine Progress Note        Chief Complaint: Inpatient Follow-up for for hip fracture     HPI:   36 year old male with pmhx of childhood brain tumor s/p excision with chornic developmental delay, anxiety, and hypothyroidism presents to the ED after a fall at home.  Mother at bedside providing history as patient unable to participate in conversation.  Mother reports multiple falls lately and she's having greater difficulty caring for him.  States he tripped and fell last night and she was unable to get him off the floor.  EMS was called and he was transported ot the hospital.  XRs revealed L intertrochanteric femur fracture.  Vitals and labs stable and at baseline.  He does not take any blood thinners.  Compliant with home med regimen.  Orthopedics was consulted and the patient was admitted to the hospitalist group. Taken to the OR on 6/28 for repair and returned to the floor in stable condition. He worked with PT and OT.  Case mgmt consulted for rehab placement. Originally found placement but insurance denied, further extending his inpatient stay.  Now recommending SNF. Requires psych eval prior to SNF placement. No additional recs from psych. Patient well adjusted.      Interval Hx:  No events overnight  Pain controlled  Awaiting placement     Objective/physical exam:  General: In no acute distress, afebrile  Chest: Clear to auscultation bilaterally  Heart: RRR, +S1, S2, no appreciable murmur  Abdomen: Soft, nontender, BS +  MSK: Warm, no lower extremity edema, no clubbing or cyanosis  Neurologic: Alert and oriented x4, Cranial nerve II-XII intact, Strength 5/5 in all 4 extremities    VITAL SIGNS: 24 HRS MIN & MAX LAST   Temp  Min: 97.5 °F (36.4 °C)  Max: 98.4 °F (36.9 °C) 98 °F (36.7 °C)   BP  Min: 95/62  Max: 130/78 108/74   Pulse  Min: 105  Max: 118  108   Resp  Min: 16  Max: 20 17   SpO2  Min: 95 %  Max: 98 % 97 %       Recent Labs   Lab 07/07/22  0750  07/10/22  0520   WBC 6.1 6.6   RBC 3.45* 3.01*   HGB 9.3* 8.0*   HCT 31.2* 26.3*   MCV 90.4 87.4   MCH 27.0 26.6*   MCHC 29.8* 30.4*   RDW 16.6 16.1    270   MPV 8.7 8.8       Recent Labs   Lab 07/07/22  0750 07/10/22  0520    136   K 4.1 4.4   CO2 24 25   BUN 7.8* 7.4*   CREATININE 0.72* 0.72*   CALCIUM 8.9 8.7          Microbiology Results (last 7 days)     ** No results found for the last 168 hours. **           See below for Radiology    Scheduled Med:   carvediloL  3.125 mg Oral BID    docosanoL   Topical (Top) 5x Daily    docusate sodium  100 mg Oral Daily    enoxaparin  40 mg Subcutaneous Daily    hydrOXYzine HCL  25 mg Oral Daily    levothyroxine  50 mcg Oral Daily    methocarbamoL  500 mg Oral TID    QUEtiapine  200 mg Oral QHS        Continuous Infusions:       PRN Meds:  sodium chloride, acetaminophen, acetaminophen, ALPRAZolam, aluminum-magnesium hydroxide-simethicone, bisacodyL, bisacodyL, ceFAZolin 2 g/50mL Dextrose IVPB, diphenhydrAMINE, famotidine, HYDROmorphone, magnesium hydroxide 400 mg/5 ml, metoprolol, morphine, morphine, ondansetron, ondansetron, ondansetron, ondansetron, oxyCODONE-acetaminophen, oxyCODONE-acetaminophen, polyethylene glycol, prochlorperazine, senna, senna-docusate 8.6-50 mg, sodium chloride 0.9%, trazodone       Assessment/Plan:   Left intertrochanteric femur fracture, displaced   Status post intramedullary nailing June 28, 2022   Fall at home   Essential Hypertension   Hypothyroidism   H/o mild developmental delay      Continue PT/OT  H/H stabilized  Awaiting SNF placement, insurance denied rehab  Medically stable for discharge       Patient condition:  Stable    Anticipated discharge and Disposition: TBD      All diagnosis and differential diagnosis have been reviewed; assessment and plan has been documented; I have personally reviewed the labs and test results that are presently available; I have reviewed the patients medication list; I have reviewed the  consulting providers response and recommendations. I have reviewed or attempted to review medical records based upon their availability    All of the patient's questions have been  addressed and answered. Patient's is agreeable to the above stated plan. I will continue to monitor closely and make adjustments to medical management as needed.  _____________________________________________________________________      Radiology:  Fl Modified Barium Swallow Speech  See Speech Therapist Notes    This procedure was auto-finalized.      Clemente Patel MD   07/11/2022

## 2022-07-12 PROCEDURE — 97110 THERAPEUTIC EXERCISES: CPT

## 2022-07-12 PROCEDURE — 97535 SELF CARE MNGMENT TRAINING: CPT | Mod: CO

## 2022-07-12 PROCEDURE — 25000003 PHARM REV CODE 250: Performed by: NURSE PRACTITIONER

## 2022-07-12 PROCEDURE — 11000001 HC ACUTE MED/SURG PRIVATE ROOM

## 2022-07-12 PROCEDURE — 97530 THERAPEUTIC ACTIVITIES: CPT

## 2022-07-12 PROCEDURE — 92610 EVALUATE SWALLOWING FUNCTION: CPT

## 2022-07-12 PROCEDURE — 25000003 PHARM REV CODE 250: Performed by: HOSPITALIST

## 2022-07-12 PROCEDURE — 25000003 PHARM REV CODE 250: Performed by: PHYSICIAN ASSISTANT

## 2022-07-12 RX ADMIN — METHOCARBAMOL 500 MG: 500 TABLET ORAL at 03:07

## 2022-07-12 RX ADMIN — OXYCODONE AND ACETAMINOPHEN 1 TABLET: 10; 325 TABLET ORAL at 09:07

## 2022-07-12 RX ADMIN — DOCOSANOL 10 % TOPICAL CREAM: at 10:07

## 2022-07-12 RX ADMIN — LEVOTHYROXINE SODIUM 50 MCG: 25 TABLET ORAL at 09:07

## 2022-07-12 RX ADMIN — POLYETHYLENE GLYCOL 3350 17 G: 17 POWDER, FOR SOLUTION ORAL at 08:07

## 2022-07-12 RX ADMIN — DOCUSATE SODIUM 100 MG: 100 CAPSULE, LIQUID FILLED ORAL at 09:07

## 2022-07-12 RX ADMIN — CARVEDILOL 3.12 MG: 3.12 TABLET, FILM COATED ORAL at 08:07

## 2022-07-12 RX ADMIN — METHOCARBAMOL 500 MG: 500 TABLET ORAL at 08:07

## 2022-07-12 RX ADMIN — CARVEDILOL 3.12 MG: 3.12 TABLET, FILM COATED ORAL at 09:07

## 2022-07-12 RX ADMIN — HYDROXYZINE HYDROCHLORIDE 25 MG: 25 TABLET ORAL at 09:07

## 2022-07-12 RX ADMIN — QUETIAPINE 200 MG: 100 TABLET ORAL at 08:07

## 2022-07-12 RX ADMIN — OXYCODONE AND ACETAMINOPHEN 1 TABLET: 10; 325 TABLET ORAL at 08:07

## 2022-07-12 RX ADMIN — METHOCARBAMOL 500 MG: 500 TABLET ORAL at 09:07

## 2022-07-12 NOTE — PROGRESS NOTES
[facility] Hospital Medicine Progress Note        Chief Complaint: Inpatient Follow-up for hip fracture     HPI:   36 year old male with pmhx of childhood brain tumor s/p excision with chornic developmental delay, anxiety, and hypothyroidism presents to the ED after a fall at home.  Mother at bedside providing history as patient unable to participate in conversation.  Mother reports multiple falls lately and she's having greater difficulty caring for him.  States he tripped and fell last night and she was unable to get him off the floor.  EMS was called and he was transported ot the hospital.  XRs revealed L intertrochanteric femur fracture.  Vitals and labs stable and at baseline.  He does not take any blood thinners.  Compliant with home med regimen.  Orthopedics was consulted and the patient was admitted to the hospitalist group. Taken to the OR on 6/28 for repair and returned to the floor in stable condition. He worked with PT and OT.  Case mgmt consulted for rehab placement. Originally found placement but insurance denied, further extending his inpatient stay.  Now recommending SNF. Requires psych eval prior to SNF placement. No additional recs from psych. Patient well adjusted.      Interval Hx:  No new issues or complaints.     Objective/physical exam:  General: In no acute distress, afebrile  Chest: Clear to auscultation bilaterally  Heart: RRR, +S1, S2, no appreciable murmur  Abdomen: Soft, nontender, BS +  MSK: Warm, no lower extremity edema, no clubbing or cyanosis  Neurologic: Alert and oriented x4, Cranial nerve II-XII intact, Strength 5/5 in all 4 extremities    VITAL SIGNS: 24 HRS MIN & MAX LAST   Temp  Min: 97.5 °F (36.4 °C)  Max: 98.2 °F (36.8 °C) 98.2 °F (36.8 °C)   BP  Min: 106/71  Max: 115/83 107/68   Pulse  Min: 102  Max: 109  109   Resp  Min: 17  Max: 20 17   SpO2  Min: 96 %  Max: 99 % 96 %       Recent Labs   Lab 07/07/22  0750 07/10/22  0520   WBC 6.1 6.6   RBC  3.45* 3.01*   HGB 9.3* 8.0*   HCT 31.2* 26.3*   MCV 90.4 87.4   MCH 27.0 26.6*   MCHC 29.8* 30.4*   RDW 16.6 16.1    270   MPV 8.7 8.8       Recent Labs   Lab 07/07/22  0750 07/10/22  0520    136   K 4.1 4.4   CO2 24 25   BUN 7.8* 7.4*   CREATININE 0.72* 0.72*   CALCIUM 8.9 8.7          Microbiology Results (last 7 days)     ** No results found for the last 168 hours. **           See below for Radiology    Scheduled Med:   carvediloL  3.125 mg Oral BID    docosanoL   Topical (Top) 5x Daily    docusate sodium  100 mg Oral Daily    enoxaparin  40 mg Subcutaneous Daily    hydrOXYzine HCL  25 mg Oral Daily    levothyroxine  50 mcg Oral Daily    methocarbamoL  500 mg Oral TID    QUEtiapine  200 mg Oral QHS        Continuous Infusions:       PRN Meds:  sodium chloride, acetaminophen, acetaminophen, ALPRAZolam, aluminum-magnesium hydroxide-simethicone, bisacodyL, bisacodyL, ceFAZolin 2 g/50mL Dextrose IVPB, diphenhydrAMINE, famotidine, HYDROmorphone, magnesium hydroxide 400 mg/5 ml, metoprolol, morphine, morphine, ondansetron, ondansetron, ondansetron, ondansetron, oxyCODONE-acetaminophen, oxyCODONE-acetaminophen, polyethylene glycol, prochlorperazine, senna, senna-docusate 8.6-50 mg, sodium chloride 0.9%, trazodone       Assessment/Plan:   Left intertrochanteric femur fracture, displaced   Status post intramedullary nailing June 28, 2022   Fall at home   Essential Hypertension   Hypothyroidism   H/o mild developmental delay      Continue PT/OT  Awaiting SNF placement. Prolonged due to level II  Medically stable  BP controlled    Patient condition:  Stable    Anticipated discharge and Disposition: TBD      All diagnosis and differential diagnosis have been reviewed; assessment and plan has been documented; I have personally reviewed the labs and test results that are presently available; I have reviewed the patients medication list; I have reviewed the consulting providers response and  recommendations. I have reviewed or attempted to review medical records based upon their availability    All of the patient's questions have been  addressed and answered. Patient's is agreeable to the above stated plan. I will continue to monitor closely and make adjustments to medical management as needed.  _____________________________________________________________________      Radiology:  Fl Modified Barium Swallow Speech  See Speech Therapist Notes    This procedure was auto-finalized.      Clemente Patel MD   07/12/2022

## 2022-07-12 NOTE — PLAN OF CARE
07/12/22 0914   Discharge Reassessment   Assessment Type Discharge Planning Reassessment   Did the patient's condition or plan change since previous assessment? No   Discharge Plan discussed with: Parent(s)   Discharge Plan A Other   Discharge Plan B Skilled Nursing Facility   DME Needed Upon Discharge  none   Discharge Barriers Identified None   Why the patient remains in the hospital Placement issues   Post-Acute Status   Post-Acute Authorization Placement   Post-Acute Placement Status Pending payor medical review/second level review  ([facility] medically accepted patient and submitted to insurance for approval)

## 2022-07-12 NOTE — PT/OT/SLP PROGRESS
Physical Therapy  Treatment    Nilo Claros Jr.   MRN: [id number]   Admitting Diagnosis: Closed comminuted intertrochanteric fracture of left femur    PT Received On: 07/12/22  PT Start Time: 0917     PT Stop Time: 0945    PT Total Time (min): 28 min       Billable Minutes:  Therapeutic Activity 14 minutes (1 unit) and Therapeutic Exercise 14 minutes (1 unit)    Treatment Type: Treatment  PT/PTA: PT     PTA Visit Number: 5       General Precautions: Standard, fall  Orthopedic Precautions: LLE weight bearing as tolerated   Braces: N/A  Respiratory Status: Room air    Spiritual, Cultural Beliefs, Latter day Practices, Values that Affect Care: no    Subjective:  Communicated with RN prior to session.  Patient stated that he has 6/10 pain and doesn't really want to get out of bed.    Pain/Comfort  Pain Rating 1: 6/10  Location - Side 1: Left  Location 1: hip  Pain Rating 2: 0/10    Objective:   Patient found with: peripheral IV    Functional Mobility:  Bed Mobility:   Supervision for supine to sit     Transfers:  CGA for sit to stand    Gait:   Antalgic step-to gait pattern. LLE WBAT. Patient ambulated 50 feet with decreased kolton and RW with SBA-CGA.      Therapeutic Activities and Exercises:  Patient performed the following exercises in standing: 20 x 1 set of marches, heel raises, and hip abduction. Patient ambulated 50 feet x 1 trial with RW (see gait details above).    AM-PAC 6 CLICK MOBILITY  How much help from another person does this patient currently need?   1 = Unable, Total/Dependent Assistance  2 = A lot, Maximum/Moderate Assistance  3 = A little, Minimum/Contact Guard/Supervision  4 = None, Modified [place]/Independent    Turning over in bed (including adjusting bedclothes, sheets and blankets)?: 4  Sitting down on and standing up from a chair with arms (e.g., wheelchair, bedside commode, etc.): 3  Moving from lying on back to sitting on the side of the bed?: 3  Moving to and from a bed to a  chair (including a wheelchair)?: 3  Need to walk in hospital room?: 3  Climbing 3-5 steps with a railing?: 2  Basic Mobility Total Score: 18    AM-PAC Raw Score CMS G-Code Modifier Level of Impairment Assistance   6 % Total / Unable   7 - 9 CM 80 - 100% Maximal Assist   10 - 14 CL 60 - 80% Moderate Assist   15 - 19 CK 40 - 60% Moderate Assist   20 - 22 CJ 20 - 40% Minimal Assist   23 CI 1-20% SBA / CGA   24 CH 0% Independent/ Mod I     Patient left up in chair with all lines intact and call button in reach.    Assessment:  Nilo Claros Jr. is a 37 y.o. male with a medical diagnosis of Closed comminuted intertrochanteric fracture of left femur, s/p L IMN and presents with impaired functional mobility.    Rehab identified problem list/impairments: Rehab identified problem list/impairments: weakness, impaired endurance, impaired functional mobilty, gait instability, impaired balance, decreased lower extremity function, decreased safety awareness, pain    Rehab potential is excellent.    Activity tolerance: Excellent    Discharge recommendations: Discharge Facility/Level of Care Needs: nursing facility, skilled     Barriers to discharge: impaired mobility    Equipment recommendations: Equipment Needed After Discharge: walker, rolling     GOALS:   Multidisciplinary Problems     Physical Therapy Goals        Problem: Physical Therapy    Goal Priority Disciplines Outcome Goal Variances Interventions   Physical Therapy Goal     PT, PT/OT Ongoing, Progressing     Description: Goals to be met by: 22     Patient will increase functional independence with mobility by performin. Supine to sit with MInimal Assistance  2. Sit to supine with MInimal Assistance  3. Sit to stand transfer with Minimal Assistance  4. Bed to chair transfer with Minimal Assistance using Rolling Walker  5. Gait  x 50 feet with Minimal Assistance using Rolling Walker.                      PLAN:    Patient to be seen 5 x/week  to  address the above listed problems via gait training, therapeutic exercises, therapeutic activities, neuromuscular re-education  Plan of Care expires: 07/28/22  Plan of Care reviewed with: patient         07/12/2022

## 2022-07-12 NOTE — PLAN OF CARE
Spoke with Luis at [facility], regarding pt's level 2 142. Sent him requested information, awaiting response. Spoke with JACKSON, pt has been accepted to [facility] pending insurance authorization.

## 2022-07-12 NOTE — PT/OT/SLP PROGRESS
Occupational Therapy  Treatment    Nilo Claros Jr.   MRN: [id number]   Admitting Diagnosis: Closed comminuted intertrochanteric fracture of left femur    OT Date of Treatment: 07/12/22   OT Start Time: 1128  OT Stop Time: 1151  OT Total Time (min): 23 min     Billable Minutes:  Self Care/Home Management 23  Total Minutes: 23     OT/DAVIDE: DAVIDE     DAVIDE Visit Number: 1    General Precautions: Standard, fall  Orthopedic Precautions: LLE weight bearing as tolerated  Braces: N/A    Spiritual, Cultural Beliefs, Cheondoism Practices, Values that Affect Care: no    Subjective:  Communicated with nurse prior to session.    Pain/Comfort  Pain Rating 1: 7/10  Location - Side 1: Left  Location 1: hip  Pain Addressed 1: Pre-medicate for activity, Reposition    Objective:  Patient found with: peripheral IV    Functional Mobility:    LE Dressing:  Patient don/doffed socks with Minimal Assistance using sock aide and reacher with verbal and tactile cues to complete task    Additional Treatment:  Patient questioned food consistancy, spoke with nurse and ST, ST will follow up on current diet    Patient left up in chair with call button in reach    ASSESSMENT:  Nilo Claros Jr. is a 37 y.o. male with a medical diagnosis of Closed comminuted intertrochanteric fracture of left femur and presents with decreased cognition, decreased ADL skills and decreased AX tolerance.    Rehab potential is good    Activity tolerance: Good    Discharge recommendations: nursing facility, skilled     Equipment recommendations: walker, rolling     GOALS:   Multidisciplinary Problems     Occupational Therapy Goals        Problem: Occupational Therapy    Goal Priority Disciplines Outcome Interventions   Occupational Therapy Goal     OT, PT/OT Ongoing, Progressing    Description: Goals to be met by: 7/12/22    Patient will increase functional independence with ADLs by performing:    Feeding with [place].  UE Dressing with [place].  LE  Dressing with [place].  Grooming while standing at sink with [place].  Toileting from toilet with [place] for hygiene and clothing management.   Toilet transfer to toilet with [place].                     Plan:  Patient to be seen 5 x/week to address the above listed problems via self-care/home management, therapeutic activities, therapeutic exercises  Plan of Care expires: 07/14/22  Plan of Care reviewed with: patient         07/12/2022

## 2022-07-12 NOTE — PT/OT/SLP EVAL
"Speech Language Pathology Department  Clinical Swallow Evaluation    Patient Name:  Nilo Claros Jr.   MRN:  [id number]  Admitting Diagnosis: Closed comminuted intertrochanteric fracture of left femur    Recommendations:     General Recommendations:  SLP follow up x1  Diet recommendations:  Easy to Chew Diet - IDDSI Level 7, Liquid Diet Level: Thin liquids - IDDSI Level 0   Swallow Strategies/Precautions: small bites/sips, slow rate and alternate solids/liquids  General Precautions: Standard, fall    History:     Past Medical History:   Diagnosis Date    Encephalomalacia     H/O brain tumor        Past Surgical History:   Procedure Laterality Date    INTRAMEDULLARY RODDING OF FEMUR Left 6/28/2022    Procedure: INSERTION, INTRAMEDULLARY AMIRAH, FEMUR;  Surgeon: Chalino Melgar DO;  Location: [facility];  Service: Orthopedics;  Laterality: Left;       Home Diet: Regular and thin liquids  Current Method of Nutrition: PO diet  soft & bite sized with thin    Patient complaint: "eat a hamburger"    Subjective     Patient awake, alert and cooperative.    Patient goals: to eat a hamburger     Pain/Comfort: Pain Rating 1: 0/10    Objective:     Oral Musculature Evaluation  · Oral Musculature: WFL  · Dentition: gums in poor condition  · Secretion Management: adequate  · Oral Labial Strength and Mobility: WFL  · Lingual Strength and Mobility: WFL  · Voice Prior to PO Intake: adequate    Consistency Fed By Oral Symptoms Pharyngeal Symptoms   Thin liquid by straw  self none none   Easy to chew solid Self  none None        Assessment:     Pt requires easy to chew solids to reduce risk of aspiration during PO intake. SLP educated pt on compensatory meal strategies to reduce the risk of aspiration.    Goals:   Multidisciplinary Problems     SLP Goals        Problem: SLP    Goal Priority Disciplines Outcome   SLP Goal     SLP Ongoing, Progressing   Description: LTG: Pt will tolerate least restrictive PO diet with no clinical " signs/sx aspiration    STGs:  Pt will tolerate easy to chew solids with improved bolus formation/transport  Pt will complete laryngeal strengthening exercises and ryan maneuver with minimal-moderate cues.  Pt will tolerate thermal stimulation to the anterior faucial pillars with 100% effortful swallows and delay less than 2 seconds.                    Plan:     SLP to follow up x1 regarding diet tolerance.     Plan of Care reviewed with:  patient   SLP Follow-Up:  Yes      Time Tracking:     SLP Treatment Date:   07/12/22  Speech Start Time:  1305  Speech Stop Time:  1320     Speech Total Time (min):  15 min    Billable minutes:  Swallow and Oral Function Evaluation, 15 minutes     07/12/2022

## 2022-07-13 PROCEDURE — 97530 THERAPEUTIC ACTIVITIES: CPT | Mod: CO

## 2022-07-13 PROCEDURE — 97530 THERAPEUTIC ACTIVITIES: CPT

## 2022-07-13 PROCEDURE — 97164 PT RE-EVAL EST PLAN CARE: CPT

## 2022-07-13 PROCEDURE — 97110 THERAPEUTIC EXERCISES: CPT

## 2022-07-13 PROCEDURE — 25000003 PHARM REV CODE 250: Performed by: PHYSICIAN ASSISTANT

## 2022-07-13 PROCEDURE — 11000001 HC ACUTE MED/SURG PRIVATE ROOM

## 2022-07-13 PROCEDURE — 63600175 PHARM REV CODE 636 W HCPCS: Performed by: PHYSICIAN ASSISTANT

## 2022-07-13 PROCEDURE — 25000003 PHARM REV CODE 250: Performed by: HOSPITALIST

## 2022-07-13 RX ADMIN — DOCOSANOL 10 % TOPICAL CREAM: at 08:07

## 2022-07-13 RX ADMIN — OXYCODONE AND ACETAMINOPHEN 1 TABLET: 10; 325 TABLET ORAL at 10:07

## 2022-07-13 RX ADMIN — QUETIAPINE 200 MG: 100 TABLET ORAL at 08:07

## 2022-07-13 RX ADMIN — LEVOTHYROXINE SODIUM 50 MCG: 25 TABLET ORAL at 09:07

## 2022-07-13 RX ADMIN — OXYCODONE HYDROCHLORIDE AND ACETAMINOPHEN 1 TABLET: 5; 325 TABLET ORAL at 08:07

## 2022-07-13 RX ADMIN — METHOCARBAMOL 500 MG: 500 TABLET ORAL at 10:07

## 2022-07-13 RX ADMIN — DOCUSATE SODIUM 100 MG: 100 CAPSULE, LIQUID FILLED ORAL at 09:07

## 2022-07-13 RX ADMIN — ENOXAPARIN SODIUM 40 MG: 100 INJECTION SUBCUTANEOUS at 04:07

## 2022-07-13 RX ADMIN — HYDROXYZINE HYDROCHLORIDE 25 MG: 25 TABLET ORAL at 09:07

## 2022-07-13 RX ADMIN — CARVEDILOL 3.12 MG: 3.12 TABLET, FILM COATED ORAL at 09:07

## 2022-07-13 RX ADMIN — OXYCODONE AND ACETAMINOPHEN 1 TABLET: 10; 325 TABLET ORAL at 04:07

## 2022-07-13 RX ADMIN — METHOCARBAMOL 500 MG: 500 TABLET ORAL at 03:07

## 2022-07-13 RX ADMIN — DOCOSANOL 10 % TOPICAL CREAM: at 10:07

## 2022-07-13 RX ADMIN — CARVEDILOL 3.12 MG: 3.12 TABLET, FILM COATED ORAL at 08:07

## 2022-07-13 RX ADMIN — OXYCODONE AND ACETAMINOPHEN 1 TABLET: 10; 325 TABLET ORAL at 02:07

## 2022-07-13 RX ADMIN — METHOCARBAMOL 500 MG: 500 TABLET ORAL at 08:07

## 2022-07-13 NOTE — PLAN OF CARE
Problem: Physical Therapy  Goal: Physical Therapy Goal  Description: Goals to be met by: 22     Patient will increase functional independence with mobility by performin. Supine to sit with MInimal Assistance  2. Sit to supine with MInimal Assistance  3. Sit to stand transfer with Minimal Assistance  4. Bed to chair transfer with Minimal Assistance using Rolling Walker  5. Gait  x 200 feet with Minimal Assistance using Rolling Walker. Revised    Outcome: Ongoing, Progressing

## 2022-07-13 NOTE — PT/OT/SLP PROGRESS
SLP followed up with nursing regarding diet tolerance. Nursing reports no difficulties and/or signs/sx of aspiration with easy to chew solids and thin liquids. SLP to sign off, please reconsult as needed.

## 2022-07-13 NOTE — PT/OT/SLP PROGRESS
Occupational Therapy  Treatment    Nilo Claros Jr.   MRN: [id number]   Admitting Diagnosis: Closed comminuted intertrochanteric fracture of left femur    OT Date of Treatment: 07/13/22   OT Start Time: 1404  OT Stop Time: 1427  OT Total Time (min): 23 min     Billable Minutes:  Therapeutic Activity 23  Total Minutes: 23     OT/DAVIDE: DAVIDE     DAVIDE Visit Number: 2    General Precautions: Standard, fall  Orthopedic Precautions: LLE weight bearing as tolerated  Braces: N/A    Spiritual, Cultural Beliefs, Yarsani Practices, Values that Affect Care: no    Subjective:  Communicated with nurse prior to session.    Objective:  Patient found with: peripheral IV    Functional Mobility:  Bed Mobility:   Supine to sit: Activity did not occur   Sit to supine: Minimal Assistance   Rolling: Standby Assistance   Scooting: Minimal Assistance    Transfer Training:   Min A with RW for functional mobility in room and transfer to bed, assistance lifting legs into bed    Balance:   Static Sit: FAIR+: Able to take MINIMAL challenges from all directions  Dynamic Sit:  FAIR+: Maintains balance through MINIMAL excursions of active trunk motion  Static Stand: FAIR: Maintains without assist but unable to take challenges  Dynamic stand: FAIR: Needs Min A for negotiating RW and vc for technique and safety    Patient left HOB elevated with call button in reach    ASSESSMENT:  Nilo Claros Jr. is a 37 y.o. male with a medical diagnosis of Closed comminuted intertrochanteric fracture of left femur and presents with decreased balance, decreased AX tolerance, increased pain and decreased ADL skills.    Rehab potential is good    Activity tolerance: Fair    Discharge recommendations: nursing facility, skilled, rehabilitation facility     Equipment recommendations: walker, rolling     GOALS:   Multidisciplinary Problems     Occupational Therapy Goals        Problem: Occupational Therapy    Goal Priority Disciplines Outcome Interventions    Occupational Therapy Goal     OT, PT/OT Ongoing, Progressing    Description: Goals to be met by: 7/12/22    Patient will increase functional independence with ADLs by performing:    Feeding with [place].  UE Dressing with [place].  LE Dressing with [place].  Grooming while standing at sink with [place].  Toileting from toilet with [place] for hygiene and clothing management.   Toilet transfer to toilet with [place].                     Plan:  Patient to be seen 5 x/week to address the above listed problems via self-care/home management, therapeutic activities, therapeutic exercises  Plan of Care expires: 07/14/22  Plan of Care reviewed with: patient         07/13/2022

## 2022-07-13 NOTE — PROGRESS NOTES
[facility] Hospital Medicine Progress Note        Chief Complaint: Inpatient Follow-up for hip fracture     HPI:   36 year old male with pmhx of childhood brain tumor s/p excision with chornic developmental delay, anxiety, and hypothyroidism presents to the ED after a fall at home.  Mother at bedside providing history as patient unable to participate in conversation.  Mother reports multiple falls lately and she's having greater difficulty caring for him.  States he tripped and fell last night and she was unable to get him off the floor.  EMS was called and he was transported ot the hospital.  XRs revealed L intertrochanteric femur fracture.  Vitals and labs stable and at baseline.  He does not take any blood thinners.  Compliant with home med regimen.  Orthopedics was consulted and the patient was admitted to the hospitalist group. Taken to the OR on 6/28 for repair and returned to the floor in stable condition. He worked with PT and OT.  Case mgmt consulted for rehab placement. Originally found placement but insurance denied, further extending his inpatient stay.  Now recommending SNF. Requires psych eval prior to SNF placement. No additional recs from psych. Patient well adjusted.      Patient was admitted after a fall and sustained a left intertrochanteric femoral fracture status post IM nailing.  Currently awaiting placement    Interval Hx:  No new issues or complaints.  Patient seen and examined at bedside.  Patient currently chewing marijuana at bedside  Has no complaints  Vitals reviewed remains mildly tachycardic on low-dose Coreg  Awaiting SNF placement     Objective/physical exam:  General: In no acute distress, afebrile  Chest: Clear to auscultation bilaterally  Heart: RRR, +S1, S2, no appreciable murmur  Abdomen: Soft, nontender, BS +  MSK: Warm, no lower extremity edema, no clubbing or cyanosis  Neurologic: Alert and oriented x4, Cranial nerve II-XII intact, Strength 5/5  in all 4 extremities    Assessment/Plan:   Left intertrochanteric femur fracture, displaced   Status post intramedullary nailing June 28, 2022   Fall at home   Essential Hypertension   Hypothyroidism   H/o mild developmental delay      Plan  Vitals reviewed remains mildly tachycardic on low-dose Coreg  Continue PT/OT  Awaiting SNF placement. Prolonged due to level II  Medically stable  BP controlled     Patient condition:  Stable     Anticipated discharge and Disposition: TBD        All diagnosis and differential diagnosis have been reviewed; assessment and plan has been documented; I have personally reviewed the labs and test results that are presently available; I have reviewed the patients medication list; I have reviewed the consulting providers response and recommendations. I have reviewed or attempted to review medical records based upon their availability     All of the patient's questions have been  addressed and answered. Patient's is agreeable to the above stated plan. I will continue to monitor closely and make adjustments to medical management as needed.      VITAL SIGNS: 24 HRS MIN & MAX LAST   Temp  Min: 97.5 °F (36.4 °C)  Max: 98.7 °F (37.1 °C) 97.5 °F (36.4 °C)   BP  Min: 95/55  Max: 138/65 108/74   Pulse  Min: 107  Max: 113  (!) 113   Resp  Min: 18  Max: 20 20   SpO2  Min: 95 %  Max: 100 % 96 %       Recent Labs   Lab 07/07/22  0750 07/10/22  0520   WBC 6.1 6.6   RBC 3.45* 3.01*   HGB 9.3* 8.0*   HCT 31.2* 26.3*   MCV 90.4 87.4   MCH 27.0 26.6*   MCHC 29.8* 30.4*   RDW 16.6 16.1    270   MPV 8.7 8.8       Recent Labs   Lab 07/07/22  0750 07/10/22  0520    136   K 4.1 4.4   CO2 24 25   BUN 7.8* 7.4*   CREATININE 0.72* 0.72*   CALCIUM 8.9 8.7          Microbiology Results (last 7 days)     ** No results found for the last 168 hours. **           See below for Radiology    Scheduled Med:   carvediloL  3.125 mg Oral BID    docosanoL   Topical (Top) 5x Daily    docusate sodium  100 mg  Oral Daily    enoxaparin  40 mg Subcutaneous Daily    hydrOXYzine HCL  25 mg Oral Daily    levothyroxine  50 mcg Oral Daily    methocarbamoL  500 mg Oral TID    QUEtiapine  200 mg Oral QHS        Continuous Infusions:       PRN Meds:  sodium chloride, acetaminophen, acetaminophen, ALPRAZolam, aluminum-magnesium hydroxide-simethicone, bisacodyL, bisacodyL, ceFAZolin 2 g/50mL Dextrose IVPB, diphenhydrAMINE, famotidine, HYDROmorphone, magnesium hydroxide 400 mg/5 ml, metoprolol, morphine, morphine, ondansetron, ondansetron, ondansetron, ondansetron, oxyCODONE-acetaminophen, oxyCODONE-acetaminophen, polyethylene glycol, prochlorperazine, senna, senna-docusate 8.6-50 mg, sodium chloride 0.9%, trazodone         VTE prophylaxis:     Patient condition:  Stable/Fair/Guarded/ Serious/ Critical    Anticipated discharge and Disposition:         All diagnosis and differential diagnosis have been reviewed; assessment and plan has been documented; I have personally reviewed the labs and test results that are presently available; I have reviewed the patients medication list; I have reviewed the consulting providers response and recommendations. I have reviewed or attempted to review medical records based upon their availability    All of the patient's questions have been  addressed and answered. Patient's is agreeable to the above stated plan. I will continue to monitor closely and make adjustments to medical management as needed.  _____________________________________________________________________    Nutrition Status:    Radiology:  Fl Modified Barium Swallow Speech  See Speech Therapist Notes    This procedure was auto-finalized.      Abby Griffin MD   07/13/2022

## 2022-07-13 NOTE — PT/OT/SLP RE-EVAL
Physical Therapy Re-evaluation    Patient Name:  Nilo Claros Jr.   MRN:  [id number]    Recommendations:     Discharge Recommendations:  nursing facility, skilled, rehabilitation facility   Discharge Equipment Recommendations: walker, rolling   Barriers to discharge: Decreased caregiver support    Assessment:     Nilo Claros Jr. is a 37 y.o. male admitted with a medical diagnosis of Closed comminuted intertrochanteric fracture of left femur.  He presents with the following impairments/functional limitations:  weakness, impaired endurance, impaired self care skills, impaired functional mobilty, gait instability, impaired balance, decreased safety awareness, pain. Difficult to keep patient motivated. Feel that he is capable of much more. Only ambulating ~50-55 ft at this time. Agree with SNF placement.     Rehab Prognosis:  Fair/good; patient would benefit from acute skilled PT services to address these deficits and reach maximum level of function.      Recent Surgery: Procedure(s) (LRB):  INSERTION, INTRAMEDULLARY AMIRAH, FEMUR (Left) 15 Days Post-Op    Plan:     During this hospitalization, patient to be seen BID to address the above listed problems via gait training, therapeutic exercises, therapeutic activities, neuromuscular re-education  · Plan of Care Expires:  07/28/22   Plan of Care Reviewed with: patient    Subjective     Communicated with nurse prior to session.  Patient found supine with telemetry upon PT entry to room, agreeable to evaluation.      Chief Complaint: pain  Patient comments/goals: none  Pain/Comfort:  · Pain Rating 1: 6/10  · Location - Side 1: Left  · Location 1: hip  · Pain Addressed 1: Distraction, Reposition  · Pain Rating Post-Intervention 1: 7/10    Patients cultural, spiritual, Voodoo conflicts given the current situation: no      Objective:     Patient found with: telemetry     General Precautions: Standard, fall   Orthopedic Precautions:LLE weight bearing as tolerated    Braces: N/A  Respiratory Status: Room air    Exams:  · Cognitive Exam:  Patient is oriented to Person, Place, Time and Situation  · Sensation:    · -       Intact  · RLE ROM: WFL  · RLE Strength: WFL  · LLE ROM: WFL  · LLE Strength: +3/5 grossly    Functional Mobility:  · Bed Mobility:     · Supine to Sit: stand by assistance  · Transfers:     · Sit to Stand:  contact guard assistance with rolling walker  · Gait: 55 ft with RW & CGA  · Balance: fair    AM-PAC 6 CLICK MOBILITY  Total Score:19       Patient left up in chair with all lines intact, call button in reach and educated on calling for assistance when ready to return to bed.    GOALS:   Multidisciplinary Problems     Physical Therapy Goals        Problem: Physical Therapy    Goal Priority Disciplines Outcome Goal Variances Interventions   Physical Therapy Goal     PT, PT/OT Ongoing, Progressing     Description: Goals to be met by: 22     Patient will increase functional independence with mobility by performin. Supine to sit with MInimal Assistance  2. Sit to supine with MInimal Assistance  3. Sit to stand transfer with Minimal Assistance  4. Bed to chair transfer with Minimal Assistance using Rolling Walker  5. Gait  x 200 feet with Minimal Assistance using Rolling Walker. Revised                     History:     Past Medical History:   Diagnosis Date    Encephalomalacia     H/O brain tumor        Past Surgical History:   Procedure Laterality Date    INTRAMEDULLARY RODDING OF FEMUR Left 2022    Procedure: INSERTION, INTRAMEDULLARY AMIRAH, FEMUR;  Surgeon: Chalino Melgar DO;  Location: [facility];  Service: Orthopedics;  Laterality: Left;       Time Tracking:     PT Received On: 22  PT Start Time: 0940     PT Stop Time: 1000  PT Total Time (min): 20 min     Billable Minutes: Re-eval 20 minutes      2022

## 2022-07-13 NOTE — PLAN OF CARE
07/13/22 1103   Discharge Reassessment   Assessment Type Discharge Planning Reassessment   Did the patient's condition or plan change since previous assessment? No   Discharge Plan discussed with: Parent(s)   Communicated RODRI with patient/caregiver Yes   Discharge Plan A Other  (swing bed)   DME Needed Upon Discharge  none   Discharge Barriers Identified None   Why the patient remains in the hospital Placement issues   Post-Acute Status   Post-Acute Authorization Placement   Post-Acute Placement Status Pending Bed Availability  ([facility])   Patient accepted and ins auth obtained for [facility]. They will hopefully have a bed tomorrow.

## 2022-07-13 NOTE — PT/OT/SLP PROGRESS
Physical Therapy Treatment    Patient Name:  Nilo Claros Jr.   MRN:  [id number]    Recommendations:     Discharge Recommendations:  nursing facility, skilled, rehabilitation facility   Discharge Equipment Recommendations: walker, rolling   Barriers to discharge: Increased need of assistance    Assessment:     Nilo Claros Jr. is a 37 y.o. male admitted with a medical diagnosis of Closed comminuted intertrochanteric fracture of left femur.  He presents with the following impairments/functional limitations:  weakness, impaired self care skills, impaired functional mobilty, gait instability, impaired balance, pain, decreased safety awareness, decreased lower extremity function.    Rehab Prognosis: Good; patient would benefit from acute skilled PT services to address these deficits and reach maximum level of function.    Recent Surgery: Procedure(s) (LRB):  INSERTION, INTRAMEDULLARY AMIRAH, FEMUR (Left) 15 Days Post-Op    Plan:     During this hospitalization, patient to be seen BID to address the identified rehab impairments via gait training, therapeutic activities, therapeutic exercises, neuromuscular re-education and progress toward the following goals:    · Plan of Care Expires:  07/28/22    Subjective     Chief Complaint: pain  Patient/Family Comments/goals: none  Pain/Comfort:  · Pain Rating 1: 6/10  · Location - Side 1: Left  · Location 1: hip  · Pain Addressed 1: Distraction, Reposition  · Pain Rating Post-Intervention 1: 7/10  · Pain Rating 2: 6/10      Objective:     Communicated with nurse prior to session.  Patient found up in chair with telemetry upon PT entry to room.     General Precautions: Standard, fall   Orthopedic Precautions:LLE weight bearing as tolerated   Braces: N/A  Respiratory Status: Room air     Functional Mobility:  · Transfers:     · Sit to Stand:  contact guard assistance with rolling walker  · Gait: 48 ft with RW & CGA  · Balance: fair      AM-PAC 6 CLICK MOBILITY  Turning  over in bed (including adjusting bedclothes, sheets and blankets)?: 4  Sitting down on and standing up from a chair with arms (e.g., wheelchair, bedside commode, etc.): 3  Moving from lying on back to sitting on the side of the bed?: 4  Moving to and from a bed to a chair (including a wheelchair)?: 3  Need to walk in hospital room?: 3  Climbing 3-5 steps with a railing?: 2  Basic Mobility Total Score: 19       Therapeutic Activities and Exercises:     Therex:   1)Patient performed seated Marches, Heel Raises, LAQ, Glute Sets, Resisted Hip ABD/ADD. All performed 2 x 10 reps.    Theract:   1)Patient ambulated 48 ft with RW & CGA. Slowed pace. Decreased weight shift on to LLE    Patient left up in chair with all lines intact, call button in reach and educated on calling for assistance when ready to return to bed..    GOALS:   Multidisciplinary Problems     Physical Therapy Goals        Problem: Physical Therapy    Goal Priority Disciplines Outcome Goal Variances Interventions   Physical Therapy Goal     PT, PT/OT Ongoing, Progressing     Description: Goals to be met by: 22     Patient will increase functional independence with mobility by performin. Supine to sit with MInimal Assistance  2. Sit to supine with MInimal Assistance  3. Sit to stand transfer with Minimal Assistance  4. Bed to chair transfer with Minimal Assistance using Rolling Walker  5. Gait  x 200 feet with Minimal Assistance using Rolling Walker. Revised                     Time Tracking:     PT Received On: 22  PT Start Time: 1330     PT Stop Time: 1355  PT Total Time (min): 25 min     Billable Minutes: Therapeutic Activity 14 minutes and Therapeutic Exercise 11 minutes    Treatment Type: Treatment  PT/PTA: PT     PTA Visit Number: 0     2022

## 2022-07-14 PROCEDURE — 94799 UNLISTED PULMONARY SVC/PX: CPT

## 2022-07-14 PROCEDURE — 97530 THERAPEUTIC ACTIVITIES: CPT

## 2022-07-14 PROCEDURE — 25000003 PHARM REV CODE 250: Performed by: PHYSICIAN ASSISTANT

## 2022-07-14 PROCEDURE — 25000003 PHARM REV CODE 250: Performed by: HOSPITALIST

## 2022-07-14 PROCEDURE — 11000001 HC ACUTE MED/SURG PRIVATE ROOM

## 2022-07-14 PROCEDURE — 63600175 PHARM REV CODE 636 W HCPCS: Performed by: PHYSICIAN ASSISTANT

## 2022-07-14 PROCEDURE — 97535 SELF CARE MNGMENT TRAINING: CPT

## 2022-07-14 RX ADMIN — DOCOSANOL 10 % TOPICAL CREAM: at 06:07

## 2022-07-14 RX ADMIN — ENOXAPARIN SODIUM 40 MG: 100 INJECTION SUBCUTANEOUS at 05:07

## 2022-07-14 RX ADMIN — CARVEDILOL 3.12 MG: 3.12 TABLET, FILM COATED ORAL at 09:07

## 2022-07-14 RX ADMIN — METHOCARBAMOL 500 MG: 500 TABLET ORAL at 09:07

## 2022-07-14 RX ADMIN — METHOCARBAMOL 500 MG: 500 TABLET ORAL at 03:07

## 2022-07-14 RX ADMIN — OXYCODONE AND ACETAMINOPHEN 1 TABLET: 10; 325 TABLET ORAL at 09:07

## 2022-07-14 RX ADMIN — DOCUSATE SODIUM 100 MG: 100 CAPSULE, LIQUID FILLED ORAL at 09:07

## 2022-07-14 RX ADMIN — OXYCODONE HYDROCHLORIDE AND ACETAMINOPHEN 1 TABLET: 5; 325 TABLET ORAL at 09:07

## 2022-07-14 RX ADMIN — HYDROXYZINE HYDROCHLORIDE 25 MG: 25 TABLET ORAL at 09:07

## 2022-07-14 RX ADMIN — OXYCODONE AND ACETAMINOPHEN 1 TABLET: 10; 325 TABLET ORAL at 03:07

## 2022-07-14 RX ADMIN — DOCOSANOL 10 % TOPICAL CREAM: at 02:07

## 2022-07-14 RX ADMIN — DOCOSANOL 10 % TOPICAL CREAM: at 09:07

## 2022-07-14 RX ADMIN — QUETIAPINE 200 MG: 100 TABLET ORAL at 09:07

## 2022-07-14 RX ADMIN — LEVOTHYROXINE SODIUM 50 MCG: 25 TABLET ORAL at 09:07

## 2022-07-14 NOTE — PT/OT/SLP PROGRESS
Physical Therapy Treatment    Patient Name:  Nilo Claros Jr.   MRN:  [id number]    Recommendations:     Discharge Recommendations:  nursing facility, skilled, rehabilitation facility   Discharge Equipment Recommendations: walker, rolling   Barriers to discharge: Increased need for assistance    Assessment:     Nilo Claros Jr. is a 37 y.o. male admitted with a medical diagnosis of Closed comminuted intertrochanteric fracture of left femur.  He presents with the following impairments/functional limitations:  weakness, impaired endurance, impaired self care skills, impaired functional mobilty, gait instability, impaired balance, decreased lower extremity function, decreased safety awareness.    Rehab Prognosis: Good; patient would benefit from acute skilled PT services to address these deficits and reach maximum level of function.    Recent Surgery: Procedure(s) (LRB):  INSERTION, INTRAMEDULLARY AMIRAH, FEMUR (Left) 16 Days Post-Op    Plan:     During this hospitalization, patient to be seen BID to address the identified rehab impairments via gait training, therapeutic activities, therapeutic exercises, neuromuscular re-education and progress toward the following goals:    · Plan of Care Expires:  07/28/22    Subjective     Chief Complaint: none  Patient/Family Comments/goals: none  Pain/Comfort:  · Pain Rating 1: 6/10  · Location - Side 1: Left  · Location 1: hip  · Pain Addressed 1: Distraction, Reposition  · Pain Rating Post-Intervention 1: 6/10      Objective:     Communicated with nurse prior to session.  Patient found up in chair with peripheral IV upon PT entry to room.     General Precautions: Standard, fall   Orthopedic Precautions:LLE weight bearing as tolerated   Braces: N/A  Respiratory Status: Room air     Functional Mobility:  · Transfers:     · Sit to Stand:  contact guard assistance with rolling walker  · Gait: 70 ft with RW & CGA  · Balance: fair  · Stairs:  Pt ascended/descended 3 stair(s)  with No Assistive Device with right handrail with Contact Guard Assistance.       AM-PAC 6 CLICK MOBILITY  Turning over in bed (including adjusting bedclothes, sheets and blankets)?: 4  Sitting down on and standing up from a chair with arms (e.g., wheelchair, bedside commode, etc.): 3  Moving from lying on back to sitting on the side of the bed?: 4  Moving to and from a bed to a chair (including a wheelchair)?: 3  Need to walk in hospital room?: 3  Climbing 3-5 steps with a railing?: 3  Basic Mobility Total Score: 20       Therapeutic Activities and Exercises:   1)Stairs: ascended/descended 3 steps with CGA. Cueing for safety.   2)Gait: Patient ambulated 70 ft with RW & CGA. Standing rest breaks    Patient left up in chair with all lines intact and call button in reach..    GOALS:   Multidisciplinary Problems     Physical Therapy Goals        Problem: Physical Therapy    Goal Priority Disciplines Outcome Goal Variances Interventions   Physical Therapy Goal     PT, PT/OT Ongoing, Progressing     Description: Goals to be met by: 22     Patient will increase functional independence with mobility by performin. Supine to sit with MInimal Assistance  2. Sit to supine with MInimal Assistance  3. Sit to stand transfer with Minimal Assistance  4. Bed to chair transfer with Minimal Assistance using Rolling Walker  5. Gait  x 200 feet with Minimal Assistance using Rolling Walker. Revised                     Time Tracking:     PT Received On: 22  PT Start Time: 1045     PT Stop Time: 1114  PT Total Time (min): 29 min     Billable Minutes: Therapeutic Activity 29 minutes    Treatment Type: Treatment  PT/PTA: PT     PTA Visit Number: 1     2022

## 2022-07-14 NOTE — PT/OT/SLP PROGRESS
"Physical Therapy      Patient Name:  Nilo Claros .   MRN:  [id number]    Patient refused stating, "I'm not doing therapy unless you get me some dip from the store." PT to f/u tomorrow.         "

## 2022-07-14 NOTE — PLAN OF CARE
07/14/22 1530   Discharge Reassessment   Assessment Type Discharge Planning Reassessment   Did the patient's condition or plan change since previous assessment? Yes   Discharge Plan discussed with: Patient   Discharge Plan A Home Health   Discharge Plan B Home Health   Why the patient remains in the hospital Placement issues   Post-Acute Status   Post-Acute Authorization Placement;Home Health   Post-Acute Placement Status   (Insurance has denied both acute rehab and skilled services)   Home Health Status Discharge Plan Changed   Discharge Delays (!) Payor Issues

## 2022-07-14 NOTE — PT/OT/SLP PROGRESS
"Occupational Therapy  Treatment    Nilo Claros Jr.   MRN: [id number]   Admitting Diagnosis: Closed comminuted intertrochanteric fracture of left femur    OT Date of Treatment: 07/14/22   OT Start Time: 1007  OT Stop Time: 1039  OT Total Time (min): 32 min    Billable Minutes:  Self Care/Home Management 17 and Therapeutic Activity 15    OT/DAVIDE: OT     DAVIDE Visit Number: 3    General Precautions: Standard, fall  Orthopedic Precautions: LLE weight bearing as tolerated  Braces:    Respiratory Status: Room air         Subjective:  Communicated with nurse prior to session.  Pt required min encouragement to get OOB and participate, but participated in all activities with pleasant mood.  Pain/Comfort  Pain Rating 1: 5/10  Location - Side 1: Left  Location 1: hip  Pain Addressed 1: Pre-medicate for activity, Reposition, Distraction    Objective:  Patient found with: peripheral IV     Functional Mobility:  Bed Mobility:  SBA to roll to R side, min A to tf supine to sitting EOB       Transfers: min Sit to stand from EOB to RW, amb using RW to recliner and performed sit to stand x2 attempts with CGA from recliner        Functional Ambulation: CGA 10 feet in room using RW    Activities of Daily Living:     Pt refused offer of assist to toilet, stated he uses urinal as needed, and refused offer of assist to brush teeth d/t "this is my last dip and I'm not spitting it out til I get more, my sister said she would bring some".  Pt dressed UB seated EOB with setup.    Balance:   Static Sit: GOOD: Takes MODERATE challenges from all directions  Dynamic Sit: GOOD-: Incosistently Maintains balance through MODERATE excursions of active trunk movement,     Static Stand: FAIR+: Takes MINIMAL challenges from all directions  Dynamic stand: FAIR: Needs CONTACT GUARD during gait    Therapeutic Activities and Exercises:  Pt stood at RW for concurrent and alternating UE reaching exercises to build standing balance and endurance, with focus " "on weight shift onto LLE as tolerated during weight shift/reaching to L side.    AM-PAC 6 CLICK ADL   How much help from another person does this patient currently need?   1 = Unable, Total/Dependent Assistance  2 = A lot, Maximum/Moderate Assistance  3 = A little, Minimum/Contact Guard/Supervision  4 = None, Modified [place]/Independent    Putting on and taking off regular lower body clothing? : 3  Bathing (including washing, rinsing, drying)?: 3  Toileting, which includes using toilet, bedpan, or urinal? : 3  Putting on and taking off regular upper body clothing?: 4  Taking care of personal grooming such as brushing teeth?: 4  Eating meals?: 4  Daily Activity Total Score: 21     AM-PAC Raw Score CMS "G-Code Modifier Level of Impairment Assistance   6 % Total / Unable   7 - 8 CM 80 - 100% Maximal Assist   9-13 CL 60 - 80% Moderate Assist   14 - 19 CK 40 - 60% Moderate Assist   20 - 22 CJ 20 - 40% Minimal Assist   23 CI 1-20% SBA / CGA   24 CH 0% Independent/ Mod I       Patient left up in chair with call button in reach and chair alarm on    ASSESSMENT:  Nilo Claros Jr. is a 37 y.o. male with a medical diagnosis of Closed comminuted intertrochanteric fracture of left femur and presents with c/o pain at LLE limiting motivation to ambulate and participate in more aggressive therapy.    Rehab identified problem list/impairments: Rehab identified problem list/impairments: weakness, impaired endurance, impaired self care skills, impaired functional mobilty, gait instability, impaired balance, impaired cognition    Rehab potential is good.    Activity tolerance: Good    Discharge recommendations: Discharge Facility/Level of Care Needs: nursing facility, skilled, rehabilitation facility     Barriers to discharge:      Equipment recommendations:       GOALS:   Multidisciplinary Problems     Occupational Therapy Goals        Problem: Occupational Therapy    Goal Priority Disciplines Outcome Interventions "   Occupational Therapy Goal     OT, PT/OT Ongoing, Progressing    Description: Goals to be met by: 7/12/22    Patient will increase functional independence with ADLs by performing:    Feeding with [place].  UE Dressing with [place].  LE Dressing with [place].  Grooming while standing at sink with [place].  Toileting from toilet with [place] for hygiene and clothing management.   Toilet transfer to toilet with [place].                     Plan:  Patient to be seen 5 x/week to address the above listed problems via self-care/home management, therapeutic activities, therapeutic exercises  Plan of Care expires: 07/28/22  Plan of Care reviewed with: patient         07/14/2022

## 2022-07-14 NOTE — PLAN OF CARE
Problem: Adult Inpatient Plan of Care  Goal: Absence of Hospital-Acquired Illness or Injury  Outcome: Ongoing, Progressing  Goal: Optimal Comfort and Wellbeing  Outcome: Ongoing, Progressing  Goal: Readiness for Transition of Care  Outcome: Ongoing, Progressing     Problem: Fall Injury Risk  Goal: Absence of Fall and Fall-Related Injury  Outcome: Ongoing, Progressing     Problem: Skin Injury Risk Increased  Goal: Skin Health and Integrity  Outcome: Ongoing, Progressing

## 2022-07-14 NOTE — PROGRESS NOTES
[facility] Hospital Medicine Progress Note        Chief Complaint: Inpatient Follow-up for hip fracture     HPI:   36 year old male with pmhx of childhood brain tumor s/p excision with chornic developmental delay, anxiety, and hypothyroidism presents to the ED after a fall at home.  Mother at bedside providing history as patient unable to participate in conversation.  Mother reports multiple falls lately and she's having greater difficulty caring for him.  States he tripped and fell last night and she was unable to get him off the floor.  EMS was called and he was transported ot the hospital.  XRs revealed L intertrochanteric femur fracture.  Vitals and labs stable and at baseline.  He does not take any blood thinners.  Compliant with home med regimen.  Orthopedics was consulted and the patient was admitted to the hospitalist group. Taken to the OR on 6/28 for repair and returned to the floor in stable condition. He worked with PT and OT.  Case mgmt consulted for rehab placement. Originally found placement but insurance denied, further extending his inpatient stay.  Now recommending SNF. Requires psych eval prior to SNF placement. No additional recs from psych. Patient well adjusted.      Patient was admitted after a fall and sustained a left intertrochanteric femoral fracture status post IM nailing.  Currently awaiting placement     Interval Hx:  No events overnight.  Tolerating PO.  Working with PT OT and speech.  No new complaints.  Awaiting placement    Objective/physical exam:  General: In no acute distress, afebrile  Chest: Clear to auscultation bilaterally  Heart: RRR, +S1, S2, no appreciable murmur  Abdomen: Soft, nontender, BS +  MSK: Warm, no lower extremity edema, no clubbing or cyanosis  Neurologic: Alert and oriented x4, Cranial nerve II-XII intact, Strength 5/5 in all 4 extremities       VITAL SIGNS: 24 HRS MIN & MAX LAST   Temp  Min: 97.5 °F (36.4 °C)  Max: 98.8 °F (37.1  °C) 98 °F (36.7 °C)   BP  Min: 98/67  Max: 126/79 104/68   Pulse  Min: 102  Max: 113  108   Resp  Min: 16  Max: 20 18   SpO2  Min: 96 %  Max: 100 % 98 %       Recent Labs   Lab 07/10/22  0520   WBC 6.6   RBC 3.01*   HGB 8.0*   HCT 26.3*   MCV 87.4   MCH 26.6*   MCHC 30.4*   RDW 16.1      MPV 8.8       Recent Labs   Lab 07/10/22  0520      K 4.4   CO2 25   BUN 7.4*   CREATININE 0.72*   CALCIUM 8.7          Microbiology Results (last 7 days)     ** No results found for the last 168 hours. **           See below for Radiology    Scheduled Med:   carvediloL  3.125 mg Oral BID    docosanoL   Topical (Top) 5x Daily    docusate sodium  100 mg Oral Daily    enoxaparin  40 mg Subcutaneous Daily    hydrOXYzine HCL  25 mg Oral Daily    levothyroxine  50 mcg Oral Daily    methocarbamoL  500 mg Oral TID    QUEtiapine  200 mg Oral QHS        Continuous Infusions:       PRN Meds:  sodium chloride, acetaminophen, acetaminophen, ALPRAZolam, aluminum-magnesium hydroxide-simethicone, bisacodyL, bisacodyL, ceFAZolin 2 g/50mL Dextrose IVPB, diphenhydrAMINE, famotidine, HYDROmorphone, magnesium hydroxide 400 mg/5 ml, metoprolol, morphine, morphine, ondansetron, ondansetron, ondansetron, ondansetron, oxyCODONE-acetaminophen, oxyCODONE-acetaminophen, polyethylene glycol, prochlorperazine, senna, senna-docusate 8.6-50 mg, sodium chloride 0.9%, trazodone       Assessment/Plan:   Left intertrochanteric femur fracture, displaced   Status post intramedullary nailing June 28, 2022   Fall at home   Essential Hypertension   Hypothyroidism   H/o mild developmental delay      Insurance has denied any type of placement including rehab, swing, and SNF  Discussed with patient's mother  Will plan to go home tomorrow with home health  She is working on transportation arrangements  Continue supportive care  PT/OT/speech daily  Medically stable    Patient condition:  Stable    Anticipated discharge and Disposition: TBD      All  diagnosis and differential diagnosis have been reviewed; assessment and plan has been documented; I have personally reviewed the labs and test results that are presently available; I have reviewed the patients medication list; I have reviewed the consulting providers response and recommendations. I have reviewed or attempted to review medical records based upon their availability    All of the patient's questions have been  addressed and answered. Patient's is agreeable to the above stated plan. I will continue to monitor closely and make adjustments to medical management as needed.  _____________________________________________________________________      Radiology:  Fl Modified Barium Swallow Speech  See Speech Therapist Notes    This procedure was auto-finalized.      Clemente Patel MD   07/14/2022

## 2022-07-15 PROCEDURE — 63600175 PHARM REV CODE 636 W HCPCS: Performed by: PHYSICIAN ASSISTANT

## 2022-07-15 PROCEDURE — 97530 THERAPEUTIC ACTIVITIES: CPT | Mod: CQ

## 2022-07-15 PROCEDURE — 97535 SELF CARE MNGMENT TRAINING: CPT | Mod: CO

## 2022-07-15 PROCEDURE — 25000003 PHARM REV CODE 250: Performed by: HOSPITALIST

## 2022-07-15 PROCEDURE — 94799 UNLISTED PULMONARY SVC/PX: CPT

## 2022-07-15 PROCEDURE — 25000003 PHARM REV CODE 250: Performed by: PHYSICIAN ASSISTANT

## 2022-07-15 PROCEDURE — 97116 GAIT TRAINING THERAPY: CPT | Mod: CQ

## 2022-07-15 PROCEDURE — 11000001 HC ACUTE MED/SURG PRIVATE ROOM

## 2022-07-15 RX ADMIN — ENOXAPARIN SODIUM 40 MG: 100 INJECTION SUBCUTANEOUS at 04:07

## 2022-07-15 RX ADMIN — DOCUSATE SODIUM 100 MG: 100 CAPSULE, LIQUID FILLED ORAL at 08:07

## 2022-07-15 RX ADMIN — OXYCODONE AND ACETAMINOPHEN 1 TABLET: 10; 325 TABLET ORAL at 10:07

## 2022-07-15 RX ADMIN — HYDROXYZINE HYDROCHLORIDE 25 MG: 25 TABLET ORAL at 08:07

## 2022-07-15 RX ADMIN — DOCOSANOL 10 % TOPICAL CREAM: at 05:07

## 2022-07-15 RX ADMIN — CARVEDILOL 3.12 MG: 3.12 TABLET, FILM COATED ORAL at 08:07

## 2022-07-15 RX ADMIN — LEVOTHYROXINE SODIUM 50 MCG: 25 TABLET ORAL at 08:07

## 2022-07-15 RX ADMIN — OXYCODONE AND ACETAMINOPHEN 1 TABLET: 10; 325 TABLET ORAL at 08:07

## 2022-07-15 RX ADMIN — SENNOSIDES 8.6 MG: 8.6 TABLET, FILM COATED ORAL at 08:07

## 2022-07-15 RX ADMIN — METHOCARBAMOL 500 MG: 500 TABLET ORAL at 08:07

## 2022-07-15 RX ADMIN — QUETIAPINE 200 MG: 100 TABLET ORAL at 08:07

## 2022-07-15 RX ADMIN — METHOCARBAMOL 500 MG: 500 TABLET ORAL at 02:07

## 2022-07-15 RX ADMIN — OXYCODONE HYDROCHLORIDE AND ACETAMINOPHEN 1 TABLET: 5; 325 TABLET ORAL at 03:07

## 2022-07-15 NOTE — PLAN OF CARE
Pt will need HH services.  Spoke with pt and mother and contacted [facility].  Packet sent per [facility].

## 2022-07-15 NOTE — PROGRESS NOTES
"Nutrition   Progress Note      Recommendations:  1. Continue diet per SLP recs  2. Monitor intake, wts, labs      Reason for Evaluation:  Length of Stay and RD follow up    Diagnosis:    1. Hip fracture    2. Chest pain    3. Closed comminuted intertrochanteric fracture of left femur, initial encounter    4. Tachycardia        Relevant Medical History:    Past Medical History:   Diagnosis Date    Encephalomalacia     H/O brain tumor          Nutrition Diet History:    Factors affecting nutritional intake: difficulty / impaired swallowing    Food / Lutheran / Culture Preferences: N/A      Nutrition Prescription Ordered:    Current Diet Order: Soft and bite sized     Appetite:  Good (> 75% - 100% po intake)    PO intake: 75 - 100 %      Labs / Medications / Procedures:    Nutrition Related Medications: docusate, oxycodone. ynthroid    Nutrition Related Labs:  7/10 :BUN 7.4. Crea 0.72       Anthropometrics:  Height: 5' 10" (1.778 m)not available  Admit Weight:  Weight: 66.2 kg (145 lb 15.1 oz)  Latest Weight:  66.2 kg (145 lb 15.1 oz)    Wt Readings from Last 5 Encounters:   07/06/22 66.2 kg (145 lb 15.1 oz)     IBW: n/a  %IBW: n/a  UBW: 66kg  %Weight Change: stable  Body mass index is 20.94 kg/m².  BMI classification:  n/a      Nutrition Narrative:  7/3: Pt stated he wants regular food. Nsg stated she will speak with SLP for possible diet upgrade since pt is doing much better. S/p Left IM nailing of hip fx on 6/28. Pt also noted they are checking his blood sugars and he is not diabetic-nsg notifying MD to discontinue CBG checks. Pt noted +pain of hip.     7/8: pt now on upgraded diet of soft and bite sized, eating most of his meals    7/15: Good appetite continues, 100% po intake of meals. No c/o n/v/d/c at this time.    Monitoring and Evaluation:    Nutrition Monitoring and Evaluation:  food and beverage intake and diet order    Nutrition Risk:  Level of Nutrition Risk:  Low  Frequency of Follow up:  Dietitian " will f/up within 7 days.

## 2022-07-15 NOTE — PT/OT/SLP PROGRESS
Physical Therapy         Treatment        Nilo Claros Jr.   MRN: [id number]     PT Received On: 07/15/22  PT Start Time: 0909     PT Stop Time: 0948    PT Total Time (min): 39 min       Billable Minutes:  Gait [id number] and Therapeutic Activity 29  Total Minutes: 39    Treatment Type: Treatment  PT/PTA: PTA     PTA Visit Number: 2       General Precautions: Standard, fall  Orthopedic Precautions: Orthopedic Precautions : LLE weight bearing as tolerated   Braces:      Spiritual, Cultural Beliefs, Mormonism Practices, Values that Affect Care: no    Subjective:  Communicated with nurse prior to session.         Objective:  Patient found supine in bed, with Patient found with: peripheral IV    Functional Mobility:  Bed Mobility:   Supine to sit: Minimal Assistance   Sit to supine: Activity did not occur   Rolling: Minimal Assistance   Scooting: Minimal Assistance    Balance:   Static Sit: FAIR+: Able to take MINIMAL challenges from all directions  Dynamic Sit:  FAIR+: Maintains balance through MINIMAL excursions of active trunk motion  Static Stand: FAIR: Maintains without assist but unable to take challenges  Dynamic stand: FAIR: Needs CONTACT GUARD during gait    Transfer Training:  Sit to stand:Minimal Assistance with Rolling Walker x4 trials    Gait Training:  Patient gait trained   18  feet on level tile with Rolling Walker with Minimal Assistance.  Pt with demonstarting a  reciprocal gait with decreased kolton.Impairments contributing to gait deviations include impaired balance and decreased strength      Additional Treatment:  Extensive discharge discussion with pt, pt's mother (via phone) and CM. Family training to be performed Sunday with pt's mom.     Activity Tolerance:  Patient tolerated treatment well    Patient left up in chair with all lines intact, call button in reach and chair alarm on.    Assessment:  Nilo Claros Jr. is a 37 y.o. male with a medical diagnosis of Closed comminuted  intertrochanteric fracture of left femur. He presents with increased transfer ability.    Rehab potential is good.    Activity tolerance: Good    Discharge recommendations: Discharge Facility/Level of Care Needs: nursing facility, skilled, rehabilitation facility     Equipment recommendations: Equipment Needed After Discharge: walker, rolling     GOALS:   Multidisciplinary Problems     Physical Therapy Goals        Problem: Physical Therapy    Goal Priority Disciplines Outcome Goal Variances Interventions   Physical Therapy Goal     PT, PT/OT Ongoing, Progressing     Description: Goals to be met by: 22     Patient will increase functional independence with mobility by performin. Supine to sit with MInimal Assistance  2. Sit to supine with MInimal Assistance  3. Sit to stand transfer with Minimal Assistance  4. Bed to chair transfer with Minimal Assistance using Rolling Walker  5. Gait  x 200 feet with Minimal Assistance using Rolling Walker. Revised                     PLAN:    Patient to be seen BID  to address the above listed problems via gait training, therapeutic activities  Plan of Care expires: 22  Plan of Care reviewed with: patient         7/15/2022

## 2022-07-15 NOTE — PROGRESS NOTES
[facility] Hospital Medicine Progress Note        Chief Complaint: Inpatient Follow-up for hip fracture     HPI:   36 year old male with pmhx of childhood brain tumor s/p excision with chornic developmental delay, anxiety, and hypothyroidism presents to the ED after a fall at home.  Mother at bedside providing history as patient unable to participate in conversation.  Mother reports multiple falls lately and she's having greater difficulty caring for him.  States he tripped and fell last night and she was unable to get him off the floor.  EMS was called and he was transported ot the hospital.  XRs revealed L intertrochanteric femur fracture.  Vitals and labs stable and at baseline.  He does not take any blood thinners.  Compliant with home med regimen.  Orthopedics was consulted and the patient was admitted to the hospitalist group. Taken to the OR on 6/28 for repair and returned to the floor in stable condition. He worked with PT and OT.  Case mgmt consulted for rehab placement. Originally found placement but insurance denied, further extending his inpatient stay.  Now recommending SNF. Requires psych eval prior to SNF placement. No additional recs from psych. Patient well adjusted.      Patient was admitted after a fall and sustained a left intertrochanteric femoral fracture status post IM nailing.  Currently awaiting placement     Interval Hx:  Patient is doing well working with physical therapy has mild pain in right leg.    Objective/physical exam:  General: In no acute distress, afebrile  Chest: Clear to auscultation bilaterally  Heart: RRR, +S1, S2, no appreciable murmur  Abdomen: Soft, nontender, BS +  MSK: Warm, no lower extremity edema, no clubbing or cyanosis  Neurologic: Alert and oriented x4, Cranial nerve II-XII intact, Strength 5/5 in all 4 extremities       VITAL SIGNS: 24 HRS MIN & MAX LAST   Temp  Min: 98 °F (36.7 °C)  Max: 98.7 °F (37.1 °C) 98.6 °F (37 °C)   BP  Min:  109/69  Max: 144/80 117/77   Pulse  Min: 99  Max: 104  99   Resp  Min: 15  Max: 20 20   SpO2  Min: 96 %  Max: 100 % 99 %       Recent Labs   Lab 07/10/22  0520   WBC 6.6   RBC 3.01*   HGB 8.0*   HCT 26.3*   MCV 87.4   MCH 26.6*   MCHC 30.4*   RDW 16.1      MPV 8.8       Recent Labs   Lab 07/10/22  0520      K 4.4   CO2 25   BUN 7.4*   CREATININE 0.72*   CALCIUM 8.7          Microbiology Results (last 7 days)     ** No results found for the last 168 hours. **           See below for Radiology    Scheduled Med:   carvediloL  3.125 mg Oral BID    docosanoL   Topical (Top) 5x Daily    docusate sodium  100 mg Oral Daily    enoxaparin  40 mg Subcutaneous Daily    hydrOXYzine HCL  25 mg Oral Daily    levothyroxine  50 mcg Oral Daily    methocarbamoL  500 mg Oral TID    QUEtiapine  200 mg Oral QHS        Continuous Infusions:       PRN Meds:  sodium chloride, acetaminophen, acetaminophen, ALPRAZolam, aluminum-magnesium hydroxide-simethicone, bisacodyL, bisacodyL, ceFAZolin 2 g/50mL Dextrose IVPB, diphenhydrAMINE, famotidine, HYDROmorphone, magnesium hydroxide 400 mg/5 ml, metoprolol, morphine, morphine, ondansetron, ondansetron, ondansetron, ondansetron, oxyCODONE-acetaminophen, oxyCODONE-acetaminophen, polyethylene glycol, prochlorperazine, senna, senna-docusate 8.6-50 mg, sodium chloride 0.9%, trazodone       Assessment/Plan:   Left intertrochanteric femur fracture, displaced   Status post intramedullary nailing June 28, 2022   Fall at home   Essential Hypertension   Hypothyroidism   H/o mild developmental delay      Insurance has denied any type of placement including rehab, swing, and SNF  She is working on transportation arrangements  Continue supportive care  PT/OT/speech daily  Medically stable  - patient will be discharged on Sunday after proper training to his mother to help taking care of him at home.    Patient condition:  Stable    Anticipated discharge and Disposition: TBD      All  diagnosis and differential diagnosis have been reviewed; assessment and plan has been documented; I have personally reviewed the labs and test results that are presently available; I have reviewed the patients medication list; I have reviewed the consulting providers response and recommendations. I have reviewed or attempted to review medical records based upon their availability    All of the patient's questions have been  addressed and answered. Patient's is agreeable to the above stated plan. I will continue to monitor closely and make adjustments to medical management as needed.  _____________________________________________________________________      Radiology:  Fl Modified Barium Swallow Speech  See Speech Therapist Notes    This procedure was auto-finalized.      Geraldo Sutton MD   07/15/2022

## 2022-07-15 NOTE — PT/OT/SLP PROGRESS
Occupational Therapy  Treatment    Nilo Claros Jr.   MRN: [id number]   Admitting Diagnosis: Closed comminuted intertrochanteric fracture of left femur    OT Date of Treatment: 07/15/22   OT Start Time: 0909  OT Stop Time: 0948  OT Total Time (min): 39 min     Billable Minutes:  Self Care/Home Management 39  Total Minutes: 39     OT/DAVIDE: DAVIDE     DAVIDE Visit Number: 4    General Precautions: Standard, fall  Orthopedic Precautions: LLE weight bearing as tolerated  Braces: N/A    Spiritual, Cultural Beliefs, Religion Practices, Values that Affect Care: no    Subjective:  Communicated with CM prior to session.  Pt alert, requiring motivation for participation     Objective:     Functional Mobility:  Bed Mobility:   Supine to sit: Standby Assistance   Sit to supine: Activity did not occur   Rolling: Activity did not occur   Scooting: Standby Assistance    UE Dressing:  Pt donned/doffed loose t-shirt with SBA seated EOB. Good sitting balance noted.     LE Dressing:  Pt donned R sock with MI, used sock aid to don L sock. Assistance provided for proper placement of sock onto sock aid. Pt stood EOB with RW and CGA to pull pants and boxers down over knees, then sat to pull down over feet. Able to thread clean boxers and shorts over feet while seated and stood to pull over hips. Min A for task completion. Max cues provided during task for attention and participation.     Balance:   Static Sit: GOOD-: Takes MODERATE challenges from all directions but inconsistently  Dynamic Sit:  GOOD-: Incosistently Maintains balance through MODERATE excursions of active trunk movement,     Static Stand: GOOD-: Takes MODERATE challenges from all directions inconsistently  Dynamic stand: FAIR: Needs CONTACT GUARD during gait    Additional Treatment:      Patient left up in chair with all lines intact, call button in reach and chair alarm on    ASSESSMENT:  Nilo Claros Jr. is a 37 y.o. male with a medical diagnosis of Closed  comminuted intertrochanteric fracture of left femur. Cues and redirection provided throughout session for attention to task and participation. Spoke with pt mother about family training prior to d/c home. Mother states she should be able to be here Sunday. CC with CM and PTA regarding DME needs and family training. Overall, tolerated session well, with extended time during tasks needed 2/2 being distracted.     Rehab potential is good    Activity tolerance: Good    Discharge recommendations: nursing facility, skilled, rehabilitation facility     Equipment recommendations: bedside commode     GOALS:   Multidisciplinary Problems       Occupational Therapy Goals          Problem: Occupational Therapy    Goal Priority Disciplines Outcome Interventions   Occupational Therapy Goal     OT, PT/OT Ongoing, Progressing    Description: Goals to be met by: 7/12/22    Patient will increase functional independence with ADLs by performing:    Feeding with [place].  UE Dressing with [place].  LE Dressing with [place].  Grooming while standing at sink with [place].  Toileting from toilet with [place] for hygiene and clothing management.   Toilet transfer to toilet with [place].                         Plan:  Patient to be seen 5 x/week to address the above listed problems via self-care/home management, therapeutic activities, therapeutic exercises  Plan of Care expires: 07/28/22  Plan of Care reviewed with: patient     Documented by SHELDON Manning. Co-signed and reviewed by EVANS Deleon Mai.          07/15/2022

## 2022-07-15 NOTE — PLAN OF CARE
Notified by nurse that plan is for dc today home with hh.  States he lives with mother and she has called expressing concerns regarding assisting with care in his current condition.  Spoke with JACKSON Cage as pt is new to me.  Also reviewd notes.  JACKSON states pt is walking up and down halls.  Therapy notes reflects ambulation of 10 ft with CGA.  Attempted to contact therapy for input. Will wait for return call.  [employer] has denied SNF or rehab services.

## 2022-07-16 PROCEDURE — 25000003 PHARM REV CODE 250: Performed by: HOSPITALIST

## 2022-07-16 PROCEDURE — 25000003 PHARM REV CODE 250: Performed by: PHYSICIAN ASSISTANT

## 2022-07-16 PROCEDURE — 63600175 PHARM REV CODE 636 W HCPCS: Performed by: PHYSICIAN ASSISTANT

## 2022-07-16 PROCEDURE — 11000001 HC ACUTE MED/SURG PRIVATE ROOM

## 2022-07-16 RX ORDER — LANOLIN ALCOHOL/MO/W.PET/CERES
1 CREAM (GRAM) TOPICAL DAILY
Status: DISCONTINUED | OUTPATIENT
Start: 2022-07-17 | End: 2022-07-17 | Stop reason: HOSPADM

## 2022-07-16 RX ADMIN — DOCOSANOL 10 % TOPICAL CREAM: at 02:07

## 2022-07-16 RX ADMIN — HYDROXYZINE HYDROCHLORIDE 25 MG: 25 TABLET ORAL at 09:07

## 2022-07-16 RX ADMIN — OXYCODONE AND ACETAMINOPHEN 1 TABLET: 10; 325 TABLET ORAL at 05:07

## 2022-07-16 RX ADMIN — DOCUSATE SODIUM 100 MG: 100 CAPSULE, LIQUID FILLED ORAL at 09:07

## 2022-07-16 RX ADMIN — OXYCODONE AND ACETAMINOPHEN 1 TABLET: 10; 325 TABLET ORAL at 06:07

## 2022-07-16 RX ADMIN — QUETIAPINE 200 MG: 100 TABLET ORAL at 09:07

## 2022-07-16 RX ADMIN — OXYCODONE AND ACETAMINOPHEN 1 TABLET: 10; 325 TABLET ORAL at 09:07

## 2022-07-16 RX ADMIN — METHOCARBAMOL 500 MG: 500 TABLET ORAL at 02:07

## 2022-07-16 RX ADMIN — METHOCARBAMOL 500 MG: 500 TABLET ORAL at 09:07

## 2022-07-16 RX ADMIN — DOCOSANOL 10 % TOPICAL CREAM: at 09:07

## 2022-07-16 RX ADMIN — CARVEDILOL 3.12 MG: 3.12 TABLET, FILM COATED ORAL at 09:07

## 2022-07-16 RX ADMIN — DIPHENHYDRAMINE HYDROCHLORIDE 25 MG: 25 CAPSULE ORAL at 02:07

## 2022-07-16 RX ADMIN — OXYCODONE AND ACETAMINOPHEN 1 TABLET: 10; 325 TABLET ORAL at 01:07

## 2022-07-16 RX ADMIN — LEVOTHYROXINE SODIUM 50 MCG: 25 TABLET ORAL at 09:07

## 2022-07-16 RX ADMIN — DOCOSANOL 10 % TOPICAL CREAM: at 05:07

## 2022-07-16 RX ADMIN — ALPRAZOLAM 0.5 MG: 0.5 TABLET ORAL at 09:07

## 2022-07-16 NOTE — PROGRESS NOTES
[facility] Hospital Medicine Progress Note        Chief Complaint: Inpatient Follow-up for hip fracture     HPI:   36 year old male with pmhx of childhood brain tumor s/p excision with chornic developmental delay, anxiety, and hypothyroidism presents to the ED after a fall at home.  Mother at bedside providing history as patient unable to participate in conversation.  Mother reports multiple falls lately and she's having greater difficulty caring for him.  States he tripped and fell last night and she was unable to get him off the floor.  EMS was called and he was transported ot the hospital.  XRs revealed L intertrochanteric femur fracture.  Vitals and labs stable and at baseline.  He does not take any blood thinners.  Compliant with home med regimen.  Orthopedics was consulted and the patient was admitted to the hospitalist group. Taken to the OR on 6/28 for repair and returned to the floor in stable condition. He worked with PT and OT.  Case mgmt consulted for rehab placement. Originally found placement but insurance denied, further extending his inpatient stay.  Now recommending SNF. Requires psych eval prior to SNF placement. No additional recs from psych. Patient well adjusted.      Patient was admitted after a fall and sustained a left intertrochanteric femoral fracture status post IM nailing. Currently awaiting placement     Interval Hx:  Patient is doing well working with physical therapy denies pain or discomfort.    Objective/physical exam:  General: In no acute distress, afebrile  Chest: Clear to auscultation bilaterally  Heart: RRR, +S1, S2, no appreciable murmur  Abdomen: Soft, nontender, BS +  MSK: Warm, no lower extremity edema, no clubbing or cyanosis  Neurologic: Alert and oriented x4, Cranial nerve II-XII intact, Strength 5/5 in all 4 extremities       VITAL SIGNS: 24 HRS MIN & MAX LAST   Temp  Min: 97.4 °F (36.3 °C)  Max: 99.2 °F (37.3 °C) 98.1 °F (36.7 °C)   BP   Min: 105/67  Max: 131/88 131/88   Pulse  Min: 98  Max: 111  103   Resp  Min: 15  Max: 20 18   SpO2  Min: 97 %  Max: 100 % 99 %       Recent Labs   Lab 07/10/22  0520   WBC 6.6   RBC 3.01*   HGB 8.0*   HCT 26.3*   MCV 87.4   MCH 26.6*   MCHC 30.4*   RDW 16.1      MPV 8.8       Recent Labs   Lab 07/10/22  0520      K 4.4   CO2 25   BUN 7.4*   CREATININE 0.72*   CALCIUM 8.7          Microbiology Results (last 7 days)     ** No results found for the last 168 hours. **           See below for Radiology    Scheduled Med:   carvediloL  3.125 mg Oral BID    docosanoL   Topical (Top) 5x Daily    docusate sodium  100 mg Oral Daily    enoxaparin  40 mg Subcutaneous Daily    ferric gluconate (FERRLECIT) IVPB  125 mg Intravenous Daily    hydrOXYzine HCL  25 mg Oral Daily    levothyroxine  50 mcg Oral Daily    methocarbamoL  500 mg Oral TID    QUEtiapine  200 mg Oral QHS        Continuous Infusions:       PRN Meds:  sodium chloride, acetaminophen, acetaminophen, ALPRAZolam, aluminum-magnesium hydroxide-simethicone, bisacodyL, bisacodyL, ceFAZolin 2 g/50mL Dextrose IVPB, diphenhydrAMINE, famotidine, HYDROmorphone, magnesium hydroxide 400 mg/5 ml, metoprolol, morphine, morphine, ondansetron, ondansetron, ondansetron, ondansetron, oxyCODONE-acetaminophen, oxyCODONE-acetaminophen, polyethylene glycol, prochlorperazine, senna, senna-docusate 8.6-50 mg, sodium chloride 0.9%, trazodone       Assessment/Plan:   Left intertrochanteric femur fracture, displaced   Status post intramedullary nailing June 28, 2022   Fall at home   Essential Hypertension   Hypothyroidism   H/o mild developmental delay      Insurance has denied any type of placement including rehab, swing, and SNF  She is working on transportation arrangements  Continue supportive care  PT/OT/speech daily  Medically stable  - patient will be discharged on Sunday after proper training to his mother to help taking care of him at home.  - DC  tomorrow.    Patient condition:  Stable    Anticipated discharge and Disposition: TBD      All diagnosis and differential diagnosis have been reviewed; assessment and plan has been documented; I have personally reviewed the labs and test results that are presently available; I have reviewed the patients medication list; I have reviewed the consulting providers response and recommendations. I have reviewed or attempted to review medical records based upon their availability    All of the patient's questions have been  addressed and answered. Patient's is agreeable to the above stated plan. I will continue to monitor closely and make adjustments to medical management as needed.  _____________________________________________________________________      Radiology:  Fl Modified Barium Swallow Speech  See Speech Therapist Notes    This procedure was auto-finalized.      Geraldo Sutton MD   07/16/2022

## 2022-07-17 VITALS
DIASTOLIC BLOOD PRESSURE: 85 MMHG | OXYGEN SATURATION: 97 % | RESPIRATION RATE: 16 BRPM | WEIGHT: 145.94 LBS | SYSTOLIC BLOOD PRESSURE: 119 MMHG | HEIGHT: 70 IN | BODY MASS INDEX: 20.89 KG/M2 | HEART RATE: 103 BPM | TEMPERATURE: 99 F

## 2022-07-17 LAB
ALBUMIN SERPL-MCNC: 3.6 GM/DL (ref 3.5–5)
ALBUMIN/GLOB SERPL: 1.2 RATIO (ref 1.1–2)
ALP SERPL-CCNC: 241 UNIT/L (ref 40–150)
ALT SERPL-CCNC: 47 UNIT/L (ref 0–55)
AST SERPL-CCNC: 53 UNIT/L (ref 5–34)
BASOPHILS # BLD AUTO: 0.07 X10(3)/MCL (ref 0–0.2)
BASOPHILS NFR BLD AUTO: 1.1 %
BILIRUBIN DIRECT+TOT PNL SERPL-MCNC: 0.6 MG/DL
BUN SERPL-MCNC: 7.9 MG/DL (ref 8.9–20.6)
CALCIUM SERPL-MCNC: 9.5 MG/DL (ref 8.4–10.2)
CHLORIDE SERPL-SCNC: 100 MMOL/L (ref 98–107)
CO2 SERPL-SCNC: 27 MMOL/L (ref 22–29)
CREAT SERPL-MCNC: 0.82 MG/DL (ref 0.73–1.18)
EOSINOPHIL # BLD AUTO: 0.52 X10(3)/MCL (ref 0–0.9)
EOSINOPHIL NFR BLD AUTO: 8.5 %
ERYTHROCYTE [DISTWIDTH] IN BLOOD BY AUTOMATED COUNT: 15.1 % (ref 11.5–17)
GLOBULIN SER-MCNC: 3 GM/DL (ref 2.4–3.5)
GLUCOSE SERPL-MCNC: 113 MG/DL (ref 74–100)
HCT VFR BLD AUTO: 28 % (ref 42–52)
HGB BLD-MCNC: 9 GM/DL (ref 14–18)
IMM GRANULOCYTES # BLD AUTO: 0.04 X10(3)/MCL (ref 0–0.04)
IMM GRANULOCYTES NFR BLD AUTO: 0.7 %
LYMPHOCYTES # BLD AUTO: 1.67 X10(3)/MCL (ref 0.6–4.6)
LYMPHOCYTES NFR BLD AUTO: 27.2 %
MCH RBC QN AUTO: 26.5 PG (ref 27–31)
MCHC RBC AUTO-ENTMCNC: 32.1 MG/DL (ref 33–36)
MCV RBC AUTO: 82.6 FL (ref 80–94)
MONOCYTES # BLD AUTO: 0.49 X10(3)/MCL (ref 0.1–1.3)
MONOCYTES NFR BLD AUTO: 8 %
NEUTROPHILS # BLD AUTO: 3.4 X10(3)/MCL (ref 2.1–9.2)
NEUTROPHILS NFR BLD AUTO: 54.5 %
NRBC BLD AUTO-RTO: 0 %
PLATELET # BLD AUTO: 345 X10(3)/MCL (ref 130–400)
PMV BLD AUTO: 8.8 FL (ref 7.4–10.4)
POTASSIUM SERPL-SCNC: 4.2 MMOL/L (ref 3.5–5.1)
PROT SERPL-MCNC: 6.6 GM/DL (ref 6.4–8.3)
RBC # BLD AUTO: 3.39 X10(6)/MCL (ref 4.7–6.1)
SODIUM SERPL-SCNC: 138 MMOL/L (ref 136–145)
WBC # SPEC AUTO: 6.2 X10(3)/MCL (ref 4.5–11.5)

## 2022-07-17 PROCEDURE — 85025 COMPLETE CBC W/AUTO DIFF WBC: CPT | Performed by: STUDENT IN AN ORGANIZED HEALTH CARE EDUCATION/TRAINING PROGRAM

## 2022-07-17 PROCEDURE — 97530 THERAPEUTIC ACTIVITIES: CPT | Mod: CQ

## 2022-07-17 PROCEDURE — 80053 COMPREHEN METABOLIC PANEL: CPT | Performed by: STUDENT IN AN ORGANIZED HEALTH CARE EDUCATION/TRAINING PROGRAM

## 2022-07-17 PROCEDURE — 36415 COLL VENOUS BLD VENIPUNCTURE: CPT | Performed by: STUDENT IN AN ORGANIZED HEALTH CARE EDUCATION/TRAINING PROGRAM

## 2022-07-17 PROCEDURE — 25000003 PHARM REV CODE 250: Performed by: HOSPITALIST

## 2022-07-17 PROCEDURE — 25000003 PHARM REV CODE 250: Performed by: PHYSICIAN ASSISTANT

## 2022-07-17 PROCEDURE — 25000003 PHARM REV CODE 250: Performed by: STUDENT IN AN ORGANIZED HEALTH CARE EDUCATION/TRAINING PROGRAM

## 2022-07-17 RX ORDER — ACETAMINOPHEN 500 MG
1000 TABLET ORAL EVERY 8 HOURS PRN
Qty: 60 TABLET | Refills: 0 | Status: SHIPPED | OUTPATIENT
Start: 2022-07-17

## 2022-07-17 RX ORDER — METHOCARBAMOL 500 MG/1
500 TABLET, FILM COATED ORAL 4 TIMES DAILY PRN
Qty: 40 TABLET | Refills: 0 | Status: SHIPPED | OUTPATIENT
Start: 2022-07-17 | End: 2022-07-27

## 2022-07-17 RX ORDER — FERROUS SULFATE 325(65) MG
325 TABLET, DELAYED RELEASE (ENTERIC COATED) ORAL DAILY
Qty: 30 TABLET | Refills: 0 | Status: SHIPPED | OUTPATIENT
Start: 2022-07-17

## 2022-07-17 RX ADMIN — DOCUSATE SODIUM 100 MG: 100 CAPSULE, LIQUID FILLED ORAL at 10:07

## 2022-07-17 RX ADMIN — DOCOSANOL 10 % TOPICAL CREAM: at 10:07

## 2022-07-17 RX ADMIN — CARVEDILOL 3.12 MG: 3.12 TABLET, FILM COATED ORAL at 10:07

## 2022-07-17 RX ADMIN — FERROUS SULFATE TAB 325 MG (65 MG ELEMENTAL FE) 1 EACH: 325 (65 FE) TAB at 10:07

## 2022-07-17 RX ADMIN — OXYCODONE AND ACETAMINOPHEN 1 TABLET: 10; 325 TABLET ORAL at 10:07

## 2022-07-17 RX ADMIN — LEVOTHYROXINE SODIUM 50 MCG: 25 TABLET ORAL at 10:07

## 2022-07-17 RX ADMIN — METHOCARBAMOL 500 MG: 500 TABLET ORAL at 10:07

## 2022-07-17 RX ADMIN — HYDROXYZINE HYDROCHLORIDE 25 MG: 25 TABLET ORAL at 10:07

## 2022-07-17 NOTE — PT/OT/SLP PROGRESS
Physical Therapy Treatment    Patient Name:  Nilo Claros Jr.   MRN:  [id number]    Recommendations:     Discharge Recommendations:  home with home health   Discharge Equipment Recommendations: walker, rolling   Barriers to discharge:      Assessment:     Nilo Claros Jr. is a 37 y.o. male admitted with a medical diagnosis of Closed comminuted intertrochanteric fracture of left femur.  He presents with the following impairments/functional limitations:  gait instability .     Pt mother and brother present for family training. Pt confirmed that pt does not have RW at home. PTA notified RN that pt will need RW for home; RN stated that he will notify CM.     Rehab Prognosis: Good; patient would benefit from acute skilled PT services to address these deficits and reach maximum level of function.    Recent Surgery: Procedure(s) (LRB):  INSERTION, INTRAMEDULLARY AMIRAH, FEMUR (Left) 19 Days Post-Op    Plan:     During this hospitalization, patient to be seen BID to address the identified rehab impairments via gait training, therapeutic activities and progress toward the following goals:    · Plan of Care Expires:  07/28/22    Subjective     Chief Complaint: Eager to go home.   Patient/Family Comments/goals: Pt mom and brother are ready to assist pt at home.   Pain/Comfort: None  ·      Objective:     Communicated with NSG prior to session.  Patient found HOB elevated with  mom and brother present upon PTA entry to room.     General Precautions: Standard, fall   Orthopedic Precautions:LLE weight bearing as tolerated   Braces: N/A  Respiratory Status: Room air     Functional Mobility:  · Bed Mobility:     · Supine to Sit: supervision  · Transfers:     · Sit to Stand:  supervision with rolling walker  · Gait: Pt amb approximately 20 ft step through gait pattern, slow pace. VCs for proper use of RW as pt tends to lift RW.   · Stairs:  Pt ascended/descended 4 stair(s) with w/o AD with bilateral handrails with Stand-by  Assistance. VCs for technique.   · Simulated lifted vehicle t/f. VCs for technique.     Educated family on how to safely sat[place]ino and doff gait belt, how to adjust RW, and weight bearing status.     Patient left up in chair with all lines intact, call button in reach and family members present..    GOALS:   Multidisciplinary Problems     Physical Therapy Goals        Problem: Physical Therapy    Goal Priority Disciplines Outcome Goal Variances Interventions   Physical Therapy Goal     PT, PT/OT Ongoing, Progressing     Description: Goals to be met by: 22     Patient will increase functional independence with mobility by performin. Supine to sit with MInimal Assistance  2. Sit to supine with MInimal Assistance  3. Sit to stand transfer with Minimal Assistance  4. Bed to chair transfer with Minimal Assistance using Rolling Walker  5. Gait  x 200 feet with Minimal Assistance using Rolling Walker. Revised                     Time Tracking:     PT Received On:    PT Start Time: 1129     PT Stop Time: 1156  PT Total Time (min): 27 min     Billable Minutes: Therapeutic Activity 27    Treatment Type: Treatment  PT/PTA: PTA     PTA Visit Number: 2     2022

## 2022-07-17 NOTE — DISCHARGE SUMMARY
[facility] Hospital Medicine Discharge Summary    Admit Date: 6/28/2022  Discharge Date and Time: No discharge date for patient encounter.   Discharging Physician: Geraldo Sutton MD.  Consults: Orthopedic Surgery    Discharge Diagnoses:  Left intertrochanteric femur fracture, displaced   Status post intramedullary nailing June 28, 2022   Fall at home   Essential Hypertension   Hypothyroidism   H/o mild developmental delay     Hospital Course:     36 year old male with pmhx of childhood brain tumor s/p excision with chornic developmental delay, anxiety, and hypothyroidism presents to the ED after a fall at home.  Mother at bedside providing history as patient unable to participate in conversation.  Mother reports multiple falls lately and she's having greater difficulty caring for him.  States he tripped and fell last night and she was unable to get him off the floor.  EMS was called and he was transported ot the hospital.  XRs revealed L intertrochanteric femur fracture.  Vitals and labs stable and at baseline.  He does not take any blood thinners.  Compliant with home med regimen.  Orthopedics was consulted and the patient was admitted to the hospitalist group. Taken to the OR on 6/28 for repair and returned to the floor in stable condition. He worked with PT and OT.  Case mgmt consulted for rehab placement. Originally found placement but insurance denied, further extending his inpatient stay.  Now recommending SNF. Requires psych eval prior to SNF placement. No additional recs from psych. Patient well adjusted.      Patient was admitted after a fall and sustained a left intertrochanteric femoral fracture status post IM nailing. Currently awaiting placement    Seen and examined patient afebrile vitals signs are stable.    Insurance has denied any type of placement including rehab, swing, and SNF  She is working on transportation arrangements  Continue supportive care  PT/OT/speech  daily  Medically stable  Proper teaching and equipment was given to the patient family history  Patient is discharged in stable condition to home follow-up with Ortho bed and also PCP.      Physical Exam:  General: In no acute distress, afebrile  Chest: Clear to auscultation bilaterally  Heart: RRR, +S1, S2, no appreciable murmur  Abdomen: Soft, nontender, BS +  MSK: Warm, no lower extremity edema, no clubbing or cyanosis  Neurologic: Alert and oriented x4, Cranial nerve II-XII intact, Strength 5/5 in all 4 extremities    Vitals:  VITAL SIGNS: 24 HRS MIN & MAX LAST   Temp  Min: 97 °F (36.1 °C)  Max: 99.1 °F (37.3 °C) 99.1 °F (37.3 °C)   BP  Min: 101/68  Max: 133/85 119/85   Pulse  Min: 78  Max: 113  103   Resp  Min: 16  Max: 18 16   SpO2  Min: 96 %  Max: 99 % 97 %         Procedures Performed: No admission procedures for hospital encounter.     Significant Diagnostic Studies: See Full reports for all details    Recent Labs   Lab 07/17/22  0401   WBC 6.2   RBC 3.39*   HGB 9.0*   HCT 28.0*   MCV 82.6   MCH 26.5*   MCHC 32.1*   RDW 15.1      MPV 8.8       Recent Labs   Lab 07/17/22  0401      K 4.2   CO2 27   BUN 7.9*   CREATININE 0.82   CALCIUM 9.5   ALBUMIN 3.6   ALKPHOS 241*   ALT 47   AST 53*   BILITOT 0.6        Microbiology Results (last 7 days)     ** No results found for the last 168 hours. **           Fl Modified Barium Swallow Speech  See Speech Therapist Notes    This procedure was auto-finalized.         Medication List      START taking these medications    acetaminophen 500 MG tablet  Commonly known as: TYLENOL  Take 2 tablets (1,000 mg total) by mouth every 8 (eight) hours as needed for Pain.     ALPRAZolam 0.5 MG tablet  Commonly known as: XANAX  Take 1 tablet (0.5 mg total) by mouth 3 (three) times daily as needed for Anxiety.     carvediloL 3.125 MG tablet  Commonly known as: COREG  Take 1 tablet (3.125 mg total) by mouth 2 (two) times daily.     ferrous sulfate 325 (65 FE) MG EC  tablet  Take 1 tablet (325 mg total) by mouth once daily.     methocarbamoL 500 MG Tab  Commonly known as: ROBAXIN  Take 1 tablet (500 mg total) by mouth 4 (four) times daily as needed (spams).        CHANGE how you take these medications    * QUEtiapine 200 MG Tab  Commonly known as: SEROQUEL  Take 1 tablet (200 mg total) by mouth every evening.  What changed: You were already taking a medication with the same name, and this prescription was added. Make sure you understand how and when to take each.     * QUEtiapine 200 MG Tab  Commonly known as: SEROQUEL  What changed: Another medication with the same name was added. Make sure you understand how and when to take each.         * This list has 2 medication(s) that are the same as other medications prescribed for you. Read the directions carefully, and ask your doctor or other care provider to review them with you.            CONTINUE taking these medications    hydrOXYzine HCL 25 MG tablet  Commonly known as: ATARAX     levothyroxine 50 MCG tablet  Commonly known as: SYNTHROID     ofloxacin 0.3 % otic solution  Commonly known as: FLOXIN        STOP taking these medications    busPIRone 5 MG Tab  Commonly known as: BUSPAR     metoprolol tartrate 50 MG tablet  Commonly known as: LOPRESSOR           Where to Get Your Medications      These medications were sent to [facility] [place], [place]  [address]  [address] [address]    Phone: [phone]   · acetaminophen 500 MG tablet  · ferrous sulfate 325 (65 FE) MG EC tablet  · methocarbamoL 500 MG Tab     You can get these medications from any pharmacy    Bring a paper prescription for each of these medications  · ALPRAZolam 0.5 MG tablet  · carvediloL 3.125 MG tablet  · QUEtiapine 200 MG Tab          Explained in detail to the patient about the discharge plan, medications, and follow-up visits. Pt understands and agrees with the treatment plan  Discharge Disposition:    Discharged  Condition: stable  Diet-   Dietary Orders (From admission, onward)     Start     Ordered    07/12/22 1321  Diet Easy to Chew  Diet effective now         07/12/22 1321               Medications Per DC med rec  Activities as tolerated   Follow-up Information     [facility] Follow up.    Specialties: Home Health Services, Home Therapy Services, Home Living Aide Services  Why: THIS IS YOUR HOME HEALTH AGENCY. THEY WILL ADMIT YOU TO  TOMORROW.  Contact information:  [id number]  [facility], [address]  [facility] [address] [phone]                     For further questions contact hospitalist office    Discharge time 33 minutes    For worsening symptoms, chest pain, shortness of breath, increased abdominal pain, high grade fever, stroke or stroke like symptoms, immediately go to the nearest Emergency Room or call 911 as soon as possible.      Geraldo Coreas M.D, on 7/17/2022. at 2:02 PM.

## 2022-07-17 NOTE — PLAN OF CARE
Nurse states pt req a walker. I reviewed notes and two  state pt has a walker.    Nurse called and tells me pts mother states pt has crutches but no walker. Pt needs a walker. Call to [employer], they are not in network with pts ins. Called several dme companies and [employer] is in network. Ref sent through [employer].

## 2022-07-17 NOTE — PLAN OF CARE
Pt will dc today with [facility]  in [place]. Ref with updated clinicals sent through [email or web address]. I noted that ref sent on Friday and this  agency has not responded regarding acceptance. I left a message with [email or web address] to contact me.             I received a r/t call and am told they agreed to accept this pt. Dc AVS faxed to agency.Nurse notified of acceptance and pt can dc once mom completes education with PT.

## 2022-07-18 ENCOUNTER — PATIENT OUTREACH (OUTPATIENT)
Dept: ADMINISTRATIVE | Facility: CLINIC | Age: 37
End: 2022-07-18
Payer: MEDICAID

## 2022-07-18 NOTE — PROGRESS NOTES
C3 nurse attempted to contact Nilo Claros Jr.  Primary Doctor [facility]   for a TCC post hospital discharge follow up call. No answer at phone number listed.

## 2022-07-19 NOTE — PROGRESS NOTES
C3 nurse spoke with Nilo Claros Jr.   for a TCC post hospital discharge follow up call. The patient does not have a scheduled HOSFU appointment with [facility] No   pcp in [place] she has it on a paper some where in house will call today  within 5-7 days post hospital discharge date 07/17/2022. C3 nurse was unable to schedule HOSFU appointment in [facility].      Please do not reply to this message, as this inbox is not routinely monitored.

## 2022-08-12 RX ORDER — HYDROCODONE BITARTRATE AND ACETAMINOPHEN 5; 325 MG/1; MG/1
1 TABLET ORAL EVERY 12 HOURS PRN
Qty: 14 TABLET | Refills: 0 | Status: SHIPPED | OUTPATIENT
Start: 2022-08-12

## 2022-10-10 PROBLEM — Z00.8 ENCOUNTER FOR PSYCHOLOGICAL EVALUATION: Status: RESOLVED | Noted: 2022-07-07 | Resolved: 2022-10-10

## 2022-12-02 ENCOUNTER — HISTORICAL (OUTPATIENT)
Dept: ADMINISTRATIVE | Facility: HOSPITAL | Age: 37
End: 2022-12-02

## 2023-10-16 NOTE — PLAN OF CARE
DCP:  Spoke with therapy,  Nursing and MD (Dr. Sutton).  Mother can come for teaching Sunday (she has no car).  Pt kirit dc with mother after teaching with HH with [facility].  Pt already has RW.   Stable glycohemoglobin, on semaglutide

## (undated) DEVICE — ELECTRODE REM POLYHESIVE II

## (undated) DEVICE — IMPLANTABLE DEVICE
Type: IMPLANTABLE DEVICE | Site: FEMUR | Status: NON-FUNCTIONAL
Removed: 2022-06-28

## (undated) DEVICE — SEE MEDLINE ITEM 157166

## (undated) DEVICE — BLADE SURG CARBON STEEL #10

## (undated) DEVICE — DRESSING TELFA + BARR 4X6IN

## (undated) DEVICE — SPONGE GAUZE 4X4 12 PLY STRL

## (undated) DEVICE — GLOVE PROTEXIS NEU-THERA SZ6

## (undated) DEVICE — GLOVE 9.0 PROTEXIS PI BLUE

## (undated) DEVICE — TAPE SILK 3IN

## (undated) DEVICE — GOWN SMARTGOWN 3XL XLONG

## (undated) DEVICE — SUT VICRYL BR 1 GEN 27 CT-1

## (undated) DEVICE — WIRE GUIDE 3.2MM 400MM
Type: IMPLANTABLE DEVICE | Site: FEMUR | Status: NON-FUNCTIONAL
Removed: 2022-06-28

## (undated) DEVICE — GLOVE PROTEXIS HYDROGEL SZ9

## (undated) DEVICE — DRESSING ISLAND TELFA 4X5IN

## (undated) DEVICE — DRAPE C-ARMOR EQUIPMENT COVER

## (undated) DEVICE — DRESSING XEROFORM 5X9IN

## (undated) DEVICE — SUT MCRYL PLUS 2-0 CT-1 36IN

## (undated) DEVICE — SUT VICRYL 1 OB 36 CTX

## (undated) DEVICE — COVER SHOE FLUID RESISTANT XL

## (undated) DEVICE — SEE MEDLINE ITEM 157125

## (undated) DEVICE — GLOVE PROTEXIS HYDROGEL SZ6

## (undated) DEVICE — APPLICATOR CHLORAPREP ORN 26ML

## (undated) DEVICE — COVER FULLGUARD SHOE HIGH-TOP

## (undated) DEVICE — BIT DRILL 4.2MM 3 FLUTD 145MM

## (undated) DEVICE — STAPLER SKIN REGULAR

## (undated) DEVICE — Device

## (undated) DEVICE — DRAPE STERI U-SHAPED 47X51IN